# Patient Record
Sex: FEMALE | Race: WHITE | Employment: OTHER | URBAN - NONMETROPOLITAN AREA
[De-identification: names, ages, dates, MRNs, and addresses within clinical notes are randomized per-mention and may not be internally consistent; named-entity substitution may affect disease eponyms.]

---

## 2017-01-12 DIAGNOSIS — F41.9 ANXIETY: Primary | ICD-10-CM

## 2017-01-16 RX ORDER — CLONAZEPAM 2 MG/1
TABLET ORAL
Qty: 30 TABLET | Refills: 2 | Status: SHIPPED | OUTPATIENT
Start: 2017-01-16 | End: 2017-07-11

## 2017-01-16 NOTE — TELEPHONE ENCOUNTER
Thanks Mary. I will refill but I'll include note she needs a follow-up for any further refills. I filled enough for time for her to make an apptmt.

## 2017-02-15 ENCOUNTER — HOSPITAL ENCOUNTER (EMERGENCY)
Facility: HOSPITAL | Age: 22
Discharge: HOME | End: 2017-02-15
Attending: PHYSICIAN ASSISTANT | Admitting: PHYSICIAN ASSISTANT
Payer: COMMERCIAL

## 2017-02-15 VITALS
OXYGEN SATURATION: 99 % | DIASTOLIC BLOOD PRESSURE: 69 MMHG | SYSTOLIC BLOOD PRESSURE: 115 MMHG | RESPIRATION RATE: 16 BRPM | HEART RATE: 77 BPM | TEMPERATURE: 98.1 F

## 2017-02-15 DIAGNOSIS — G43.909 MIGRAINE WITHOUT STATUS MIGRAINOSUS, NOT INTRACTABLE, UNSPECIFIED MIGRAINE TYPE: ICD-10-CM

## 2017-02-15 PROCEDURE — 99213 OFFICE O/P EST LOW 20 MIN: CPT | Performed by: PHYSICIAN ASSISTANT

## 2017-02-15 PROCEDURE — 96372 THER/PROPH/DIAG INJ SC/IM: CPT

## 2017-02-15 PROCEDURE — 25000125 ZZHC RX 250: Performed by: PHYSICIAN ASSISTANT

## 2017-02-15 PROCEDURE — 25000128 H RX IP 250 OP 636: Performed by: PHYSICIAN ASSISTANT

## 2017-02-15 PROCEDURE — 99214 OFFICE O/P EST MOD 30 MIN: CPT | Mod: 25

## 2017-02-15 RX ORDER — DIAZEPAM 10 MG/2ML
5 INJECTION, SOLUTION INTRAMUSCULAR; INTRAVENOUS ONCE
Status: COMPLETED | OUTPATIENT
Start: 2017-02-15 | End: 2017-02-15

## 2017-02-15 RX ORDER — DEXAMETHASONE SODIUM PHOSPHATE 10 MG/ML
10 INJECTION, SOLUTION INTRAMUSCULAR; INTRAVENOUS ONCE
Status: COMPLETED | OUTPATIENT
Start: 2017-02-15 | End: 2017-02-15

## 2017-02-15 RX ORDER — DIPHENHYDRAMINE HYDROCHLORIDE 50 MG/ML
25 INJECTION INTRAMUSCULAR; INTRAVENOUS ONCE
Status: COMPLETED | OUTPATIENT
Start: 2017-02-15 | End: 2017-02-15

## 2017-02-15 RX ORDER — KETOROLAC TROMETHAMINE 30 MG/ML
30 INJECTION, SOLUTION INTRAMUSCULAR; INTRAVENOUS ONCE
Status: COMPLETED | OUTPATIENT
Start: 2017-02-15 | End: 2017-02-15

## 2017-02-15 RX ORDER — ONDANSETRON 4 MG/1
4 TABLET, ORALLY DISINTEGRATING ORAL ONCE
Status: COMPLETED | OUTPATIENT
Start: 2017-02-15 | End: 2017-02-15

## 2017-02-15 RX ADMIN — DEXAMETHASONE SODIUM PHOSPHATE 10 MG: 10 INJECTION INTRAMUSCULAR; INTRAVENOUS at 16:16

## 2017-02-15 RX ADMIN — ONDANSETRON 4 MG: 4 TABLET, ORALLY DISINTEGRATING ORAL at 15:44

## 2017-02-15 RX ADMIN — DIPHENHYDRAMINE HYDROCHLORIDE 25 MG: 50 INJECTION, SOLUTION INTRAMUSCULAR; INTRAVENOUS at 16:14

## 2017-02-15 RX ADMIN — SODIUM CHLORIDE 1000 ML: 9 INJECTION, SOLUTION INTRAVENOUS at 15:48

## 2017-02-15 RX ADMIN — DIAZEPAM 5 MG: 5 INJECTION, SOLUTION INTRAMUSCULAR; INTRAVENOUS at 16:46

## 2017-02-15 RX ADMIN — KETOROLAC TROMETHAMINE 30 MG: 30 INJECTION, SOLUTION INTRAMUSCULAR; INTRAVENOUS at 15:49

## 2017-02-15 ASSESSMENT — ENCOUNTER SYMPTOMS
VOMITING: 1
RESPIRATORY NEGATIVE: 1
NAUSEA: 1
CHILLS: 0
HEADACHES: 1
CARDIOVASCULAR NEGATIVE: 1
PSYCHIATRIC NEGATIVE: 1
FEVER: 0

## 2017-02-15 NOTE — ED AVS SNAPSHOT
[facility]    [address] [address]    Phone:  [phone]                                       Valorie Awad   MRN: [id number]    Department:  [facility]   Date of Visit:  2/15/2017           After Visit Summary Signature Page     I have received my discharge instructions, and my questions have been answered. I have discussed any challenges I see with this plan with the nurse or doctor.    ..........................................................................................................................................  Patient/Patient Representative Signature      ..........................................................................................................................................  Patient Representative Print Name and Relationship to Patient    ..................................................               ................................................  Date                                            Time    ..........................................................................................................................................  Reviewed by Signature/Title    ...................................................              ..............................................  Date                                                            Time

## 2017-02-15 NOTE — DISCHARGE INSTRUCTIONS
- Rest, fluids  - Follow up tomorrow if still having headaches, epsecially with any fevers/sinus pressure.     * Back here with worst headache of life.

## 2017-02-15 NOTE — LETTER
[facility]  [address]  [place] [address]  [phone]    February 15, 2017    Valorie Awad  [address]   [place] [address]  [phone] (home)     : 1995      To Whom it may concern:    Valorie Awad was seen and treated in our Emergency Department today, February 15, 2017.  I expect her condition to improve over the next 1-2 days.  She may return to work/school when improved.          Sincerely,        Gold Dumont PA-C   2/15/2017   4:59 PM

## 2017-02-15 NOTE — ED AVS SNAPSHOT
[facility]    [address] [place] [address]    Phone:  [phone]                                       Valorie Awad   MRN: [id number]    Department:  [facility]   Date of Visit:  2/15/2017           Patient Information     Date Of Birth          1995        Your diagnoses for this visit were:     Migraine without status migrainosus, not intractable, unspecified migraine type        You were seen by Gold Dumont PA.      Follow-up Information     Go to Bisi Muro MD.    Specialty:  Family Practice    Why:  Monday, February 20th @ 1:45pm    Contact information:    [facility]  [address] [facility] [address] [phone]          Please follow up.    Why:  OR tomorrow with someone in family practice if still having headaches        Discharge Instructions       - Rest, fluids  - Follow up tomorrow if still having headaches, epsecially with any fevers/sinus pressure.     * Back here with worst headache of life.     Discharge References/Attachments     HEADACHE, MIGRAINE: STAGES AND TREATMENT (ENGLISH)      Future Appointments        Provider Department Dept Phone Center    2/20/2017 2:00 PM Bisi Muro MD [facility] [phone] [facility]         Review of your medicines      Our records show that you are taking the medicines listed below. If these are incorrect, please call your family doctor or clinic.        Dose / Directions Last dose taken    butalbital-acetaminophen-caffeine -40 MG per tablet   Commonly known as:  FIORICET/ESGIC   Quantity:  30 tablet        TAKE 1 TABLET BY MOUTH EVERY 4 HOURS AS NEEDED, MAX OF 6 TABLETS IN 24 HOURS   Refills:  0        clonazePAM 2 MG tablet   Commonly known as:  klonoPIN   Quantity:  30 tablet        Take 1 tab daily as needed for anxiety. * need clinic follow-up apptmt for further refills [phone]   Refills:  2        cyclobenzaprine 10 MG tablet   Commonly known as:  FLEXERIL    Quantity:  40 tablet        TAKE 1 TABLET BY MOUTH 3 TIMES A DAY AS NEEDED FOR MUSCLE SPASMS   Refills:  0        gabapentin 300 MG capsule   Commonly known as:  NEURONTIN   Dose:  300 mg   Quantity:  90 capsule        Take 1 capsule (300 mg) by mouth 3 times daily   Refills:  1        IBUPROFEN PO   Dose:  800 mg        Take 800 mg by mouth   Refills:  0        medroxyPROGESTERone 150 MG/ML injection   Commonly known as:  DEPO-PROVERA   Dose:  150 mg   Quantity:  1 mL        Inject 1 mL (150 mg) into the muscle every 3 months   Refills:  3        nabumetone 500 MG tablet   Commonly known as:  RELAFEN   Dose:  500-1000 mg   Quantity:  60 tablet        Take 1-2 tablets (500-1,000 mg) by mouth 2 times daily as needed for moderate pain   Refills:  1        NEXPLANON 68 MG Impl   Dose:  1 each   Generic drug:  etonogestrel        1 each by Subdermal route once.   Refills:  0        propranolol 60 MG 24 hr capsule   Commonly known as:  INDERAL LA   Dose:  180 mg   Quantity:  90 capsule        Take 3 capsules (180 mg) by mouth daily   Refills:  2        sertraline 100 MG tablet   Commonly known as:  ZOLOFT   Dose:  150 mg   Quantity:  45 tablet        Take 1.5 tablets (150 mg) by mouth daily   Refills:  11        SUMAtriptan 100 MG tablet   Commonly known as:  IMITREX   Dose:  100 mg   Quantity:  9 tablet        Take 1 tablet (100 mg) by mouth at onset of headache for migraine May repeat in 2 hours if needed: max 2/day; average number of headaches monthly   Refills:  3        traMADol 50 MG tablet   Commonly known as:  ULTRAM   Quantity:  10 tablet        TAKE 1 TABLET BY MOUTH EVERY 6 HOURS AS NEEDED FOR MODERATE PAIN   Refills:  0                Orders Needing Specimen Collection     None      Pending Results     No orders found from 2/13/2017 to 2/16/2017.            Pending Culture Results     No orders found from 2/13/2017 to 2/16/2017.            Thank you for choosing [facility]       Thank you for choosing  "[facility] for your care. Our goal is always to provide you with excellent care. Hearing back from our patients is one way we can continue to improve our services. Please take a few minutes to complete the written survey that you may receive in the mail after you visit with us. Thank you!        [facility] Information     [email or web address] lets you send messages to your doctor, view your test results, renew your prescriptions, schedule appointments and more. To sign up, go to [email or web address] . Click on \"Log in\" on the left side of the screen, which will take you to the Welcome page. Then click on \"Sign up Now\" on the right side of the page.     You will be asked to enter the access code listed below, as well as some personal information. Please follow the directions to create your username and password.     Your access code is: [email or web address]  Expires: 2017  4:59 PM     Your access code will  in 90 days. If you need help or a new code, please call your [facility] clinic or [phone].        Care EveryWhere ID     This is your Care EveryWhere ID. This could be used by other organizations to access your [facility] medical records  [id number]        After Visit Summary       This is your record. Keep this with you and show to your community pharmacist(s) and doctor(s) at your next visit.                  "

## 2017-02-15 NOTE — ED PROVIDER NOTES
History     Chief Complaint   Patient presents with     Headache     c/o migraine h/a, notes chronic migraines, usually gets a shot of toradol and a pill     The history is provided by the patient. No  was used.     Valorie Awad is a 21 year old female who presents with her typical migraine that is NOT responding to imitrex. Pt reports photophobia, phonophobia, nausea and vomiting since midnight. She is unable to keep anything down today. She denies sinus symptoms, fevers. She also takes inderal LA and fioricet for migraine and not helping.      I have reviewed the Medications, Allergies, Past Medical and Surgical History, and Social History in the Epic system.    Review of Systems   Constitutional: Negative for chills and fever.   Respiratory: Negative.    Cardiovascular: Negative.    Gastrointestinal: Positive for nausea and vomiting.   Skin: Negative for rash.   Neurological: Positive for headaches.   Psychiatric/Behavioral: Negative.        Physical Exam   BP: 137/79  Heart Rate: 100  Temp: 96.7  F (35.9  C)  Resp: 18  SpO2: 98 %  Physical Exam   Constitutional: She is oriented to person, place, and time. She appears well-developed and well-nourished. She appears distressed (pt appears uncomfortable, seated in dim room).   HENT:   Head: Normocephalic and atraumatic.   Right Ear: External ear normal.   Left Ear: External ear normal.   Mouth/Throat: Oropharynx is clear and moist.   Eyes: Conjunctivae and EOM are normal. Pupils are equal, round, and reactive to light.   Cardiovascular: Normal rate.    Pulmonary/Chest: Effort normal.   Lymphadenopathy:     She has no cervical adenopathy.   Neurological: She is alert and oriented to person, place, and time. No cranial nerve deficit.   Skin: Skin is warm and dry.   Psychiatric: She has a normal mood and affect.   Nursing note and vitals reviewed.      ED Course     ED Course     Procedures  Medications   0.9% sodium chloride BOLUS (0 mLs  Intravenous Stopped 2/15/17 1648)   ondansetron (ZOFRAN-ODT) ODT tab 4 mg (4 mg Oral Given 2/15/17 1544)   ketorolac (TORADOL) injection 30 mg (30 mg Intravenous Given 2/15/17 1549)   dexamethasone (DECADRON) injection 10 mg (10 mg Intravenous Given 2/15/17 1616)   diphenhydrAMINE (BENADRYL) injection 25 mg (25 mg Intravenous Given 2/15/17 1614)   diazepam (VALIUM) injection 5 mg (5 mg Intravenous Given 2/15/17 1646)   Patient tolerated. Patient observed for 20 minutes.       Assessments & Plan (with Medical Decision Making)     I have reviewed the nursing notes.    I have reviewed the findings, diagnosis, plan and need for follow up with the patient.    Discharge Medication List as of 2/15/2017  4:59 PM          Final diagnoses:   Migraine without status migrainosus, not intractable, unspecified migraine type   Migraine improved to manageable 4/10 prior to discharge. Pt will sleep and remain hydrated. She will f/u in primary care clinic tomorrow if still having HA, otherwise on Monday. She will seek attention sooner with recurrence of intense HA/worst HA of life. Patient verbally educated and given appropriate education sheets for each of the diagnoses and has no questions.    Gold Dumont PA-C   2/15/2017   6:13 PM    2/15/2017   [facility]     Gold Dumont PA  02/15/17 [phone]

## 2017-03-09 ENCOUNTER — APPOINTMENT (OUTPATIENT)
Dept: LAB | Facility: OTHER | Age: 22
End: 2017-03-09
Attending: NURSE PRACTITIONER
Payer: COMMERCIAL

## 2017-03-09 ENCOUNTER — OFFICE VISIT (OUTPATIENT)
Dept: OBGYN | Facility: OTHER | Age: 22
End: 2017-03-09
Attending: NURSE PRACTITIONER
Payer: COMMERCIAL

## 2017-03-09 DIAGNOSIS — Z30.42 ENCOUNTER FOR SURVEILLANCE OF INJECTABLE CONTRACEPTIVE: Primary | ICD-10-CM

## 2017-03-09 LAB — HCG UR QL: NEGATIVE

## 2017-03-09 PROCEDURE — 96372 THER/PROPH/DIAG INJ SC/IM: CPT | Performed by: NURSE PRACTITIONER

## 2017-03-09 PROCEDURE — 81025 URINE PREGNANCY TEST: CPT | Performed by: NURSE PRACTITIONER

## 2017-03-09 PROCEDURE — 99207 ZZC NO CHARGE NURSE ONLY: CPT | Performed by: NURSE PRACTITIONER

## 2017-03-09 NOTE — PROGRESS NOTES
The following medication was given:     MEDICATION: Depo Provera 150mg  ROUTE: IM  SITE: Deltoid - Left  DOSE: 1 mL  LOT #: [id number]  :  [employer]   EXPIRATION DATE:  8/31/2019  NDC#: [address]-8  Patient will return to the clinic for another injection between 5/25/2017-6/8/2017.    Mary Jarvis

## 2017-03-09 NOTE — MR AVS SNAPSHOT
"              After Visit Summary   3/9/2017    Valorie Awad    MRN: [id number]           Patient Information     Date Of Birth          1995        Visit Information        Provider Department      3/9/2017 11:00 AM [facility]        Today's Diagnoses     Encounter for surveillance of injectable contraceptive    -  1       Follow-ups after your visit        Who to contact     If you have questions or need follow up information about today's clinic visit or your schedule please contact [facility] directly at [phone].  Normal or non-critical lab and imaging results will be communicated to you by S[employer], letter or phone within 4 business days after the clinic has received the results. If you do not hear from us within 7 days, please contact the clinic through Sunglass[employer] or phone. If you have a critical or abnormal lab result, we will notify you by phone as soon as possible.  Submit refill requests through [employer] or call your pharmacy and they will forward the refill request to us. Please allow 3 business days for your refill to be completed.          Additional Information About Your Visit        MyChart Information     GERS lets you send messages to your doctor, view your test results, renew your prescriptions, schedule appointments and more. To sign up, go to [email or web address] . Click on \"Log in\" on the left side of the screen, which will take you to the Welcome page. Then click on \"Sign up Now\" on the right side of the page.     You will be asked to enter the access code listed below, as well as some personal information. Please follow the directions to create your username and password.     Your access code is: [email or web address]  Expires: 2017  4:59 PM     Your access code will  in 90 days. If you need help or a new code, please call your [facility] or [phone].        Care EveryWhere ID     This is your Care EveryWhere ID. This could " be used by other organizations to access your [facility] medical records  [id number]         Blood Pressure from Last 3 Encounters:   02/15/17 115/69   07/26/16 120/78   04/11/16 112/70    Weight from Last 3 Encounters:   07/26/16 210 lb (95.3 kg)   04/11/16 210 lb (95.3 kg)   03/10/16 209 lb (94.8 kg)              We Performed the Following     HCG qualitative urine        Primary Care Provider Office Phone # Fax #    Bisi Muro [phone]       [facility] [place] [address]  [place] [address]        Thank you!     Thank you for choosing [facility] [place]  for your care. Our goal is always to provide you with excellent care. Hearing back from our patients is one way we can continue to improve our services. Please take a few minutes to complete the written survey that you may receive in the mail after your visit with us. Thank you!             Your Updated Medication List - Protect others around you: Learn how to safely use, store and throw away your medicines at www.disposemymeds.org.          This list is accurate as of: 3/9/17 11:17 AM.  Always use your most recent med list.                   Brand Name Dispense Instructions for use    butalbital-acetaminophen-caffeine -40 MG per tablet    FIORICET/ESGIC    30 tablet    TAKE 1 TABLET BY MOUTH EVERY 4 HOURS AS NEEDED, MAX OF 6 TABLETS IN 24 HOURS       clonazePAM 2 MG tablet    klonoPIN    30 tablet    Take 1 tab daily as needed for anxiety. * need clinic follow-up apptmt for further refills [phone]       cyclobenzaprine 10 MG tablet    FLEXERIL    40 tablet    TAKE 1 TABLET BY MOUTH 3 TIMES A DAY AS NEEDED FOR MUSCLE SPASMS       gabapentin 300 MG capsule    NEURONTIN    90 capsule    Take 1 capsule (300 mg) by mouth 3 times daily       IBUPROFEN PO      Take 800 mg by mouth       medroxyPROGESTERone 150 MG/ML injection    DEPO-PROVERA    1 mL    Inject 1 mL (150 mg) into the muscle every 3 months       nabumetone 500  MG tablet    RELAFEN    60 tablet    Take 1-2 tablets (500-1,000 mg) by mouth 2 times daily as needed for moderate pain       NEXPLANON 68 MG Impl   Generic drug:  etonogestrel      1 each by Subdermal route once.       propranolol 60 MG 24 hr capsule    INDERAL LA    90 capsule    Take 3 capsules (180 mg) by mouth daily       sertraline 100 MG tablet    ZOLOFT    45 tablet    Take 1.5 tablets (150 mg) by mouth daily       SUMAtriptan 100 MG tablet    IMITREX    9 tablet    Take 1 tablet (100 mg) by mouth at onset of headache for migraine May repeat in 2 hours if needed: max 2/day; average number of headaches monthly       traMADol 50 MG tablet    ULTRAM    10 tablet    TAKE 1 TABLET BY MOUTH EVERY 6 HOURS AS NEEDED FOR MODERATE PAIN

## 2017-03-22 ENCOUNTER — OFFICE VISIT (OUTPATIENT)
Dept: FAMILY MEDICINE | Facility: OTHER | Age: 22
End: 2017-03-22
Attending: FAMILY MEDICINE
Payer: COMMERCIAL

## 2017-03-22 VITALS
OXYGEN SATURATION: 98 % | WEIGHT: 207 LBS | TEMPERATURE: 98.5 F | DIASTOLIC BLOOD PRESSURE: 72 MMHG | HEART RATE: 88 BPM | HEIGHT: 66 IN | BODY MASS INDEX: 33.27 KG/M2 | SYSTOLIC BLOOD PRESSURE: 108 MMHG

## 2017-03-22 DIAGNOSIS — G43.009 MIGRAINE WITHOUT AURA AND WITHOUT STATUS MIGRAINOSUS, NOT INTRACTABLE: ICD-10-CM

## 2017-03-22 DIAGNOSIS — G43.109 MIGRAINE WITH AURA AND WITHOUT STATUS MIGRAINOSUS, NOT INTRACTABLE: ICD-10-CM

## 2017-03-22 DIAGNOSIS — H66.003 ACUTE SUPPURATIVE OTITIS MEDIA OF BOTH EARS WITHOUT SPONTANEOUS RUPTURE OF TYMPANIC MEMBRANES, RECURRENCE NOT SPECIFIED: Primary | ICD-10-CM

## 2017-03-22 PROBLEM — Z71.89 ACP (ADVANCE CARE PLANNING): Chronic | Status: ACTIVE | Noted: 2017-03-22

## 2017-03-22 PROCEDURE — 99213 OFFICE O/P EST LOW 20 MIN: CPT | Performed by: FAMILY MEDICINE

## 2017-03-22 PROCEDURE — 99212 OFFICE O/P EST SF 10 MIN: CPT

## 2017-03-22 RX ORDER — TRAMADOL HYDROCHLORIDE 50 MG/1
TABLET ORAL
Qty: 10 TABLET | Refills: 0 | Status: SHIPPED | OUTPATIENT
Start: 2017-03-22 | End: 2018-01-19

## 2017-03-22 RX ORDER — SUMATRIPTAN 100 MG/1
100 TABLET, FILM COATED ORAL
Qty: 9 TABLET | Refills: 3 | Status: SHIPPED | OUTPATIENT
Start: 2017-03-22 | End: 2018-03-15

## 2017-03-22 RX ORDER — AZITHROMYCIN 250 MG/1
TABLET, FILM COATED ORAL
Qty: 6 TABLET | Refills: 0 | Status: SHIPPED | OUTPATIENT
Start: 2017-03-22 | End: 2017-05-16

## 2017-03-22 RX ORDER — PROPRANOLOL HCL 60 MG
180 CAPSULE, EXTENDED RELEASE 24HR ORAL DAILY
Qty: 90 CAPSULE | Refills: 2 | Status: SHIPPED | OUTPATIENT
Start: 2017-03-22 | End: 2017-05-16

## 2017-03-22 RX ORDER — BUTALBITAL, ACETAMINOPHEN AND CAFFEINE 50; 325; 40 MG/1; MG/1; MG/1
TABLET ORAL
Qty: 30 TABLET | Refills: 0 | Status: SHIPPED | OUTPATIENT
Start: 2017-03-22 | End: 2018-01-19

## 2017-03-22 ASSESSMENT — ANXIETY QUESTIONNAIRES
7. FEELING AFRAID AS IF SOMETHING AWFUL MIGHT HAPPEN: SEVERAL DAYS
6. BECOMING EASILY ANNOYED OR IRRITABLE: MORE THAN HALF THE DAYS
1. FEELING NERVOUS, ANXIOUS, OR ON EDGE: SEVERAL DAYS
GAD7 TOTAL SCORE: 5
3. WORRYING TOO MUCH ABOUT DIFFERENT THINGS: SEVERAL DAYS
2. NOT BEING ABLE TO STOP OR CONTROL WORRYING: NOT AT ALL
IF YOU CHECKED OFF ANY PROBLEMS ON THIS QUESTIONNAIRE, HOW DIFFICULT HAVE THESE PROBLEMS MADE IT FOR YOU TO DO YOUR WORK, TAKE CARE OF THINGS AT HOME, OR GET ALONG WITH OTHER PEOPLE: NOT DIFFICULT AT ALL
5. BEING SO RESTLESS THAT IT IS HARD TO SIT STILL: NOT AT ALL

## 2017-03-22 ASSESSMENT — PAIN SCALES - GENERAL: PAINLEVEL: MILD PAIN (3)

## 2017-03-22 ASSESSMENT — PATIENT HEALTH QUESTIONNAIRE - PHQ9: 5. POOR APPETITE OR OVEREATING: NOT AT ALL

## 2017-03-22 NOTE — MR AVS SNAPSHOT
"              After Visit Summary   3/22/2017    Valorie Awad    MRN: [id number]           Patient Information     Date Of Birth          1995        Visit Information        Provider Department      3/22/2017 4:00 PM [facility]        Today's Diagnoses     Acute suppurative otitis media of both ears without spontaneous rupture of tympanic membranes, recurrence not specified    -  1    Migraine with aura and without status migrainosus, not intractable        Migraine without aura and without status migrainosus, not intractable          Care Instructions    Take azithromycin (Zithromax) as written        Follow-ups after your visit        Follow-up notes from your care team     Return if symptoms worsen or fail to improve.      Who to contact     If you have questions or need follow up information about today's clinic visit or your schedule please contact [facility] directly at [phone].  Normal or non-critical lab and imaging results will be communicated to you by MyChart, letter or phone within 4 business days after the clinic has received the results. If you do not hear from us within 7 days, please contact the clinic through MyChart or phone. If you have a critical or abnormal lab result, we will notify you by phone as soon as possible.  Submit refill requests through [employer] or call your pharmacy and they will forward the refill request to us. Please allow 3 business days for your refill to be completed.          Additional Information About Your Visit        MyChart Information     [employer] lets you send messages to your doctor, view your test results, renew your prescriptions, schedule appointments and more. To sign up, go to [email or web address] . Click on \"Log in\" on the left side of the screen, which will take you to the Welcome page. Then click on \"Sign up Now\" on the right side of the page.     You will be asked to enter the access code listed " "below, as well as some personal information. Please follow the directions to create your username and password.     Your access code is: [email or web address]  Expires: 2017  5:59 PM     Your access code will  in 90 days. If you need help or a new code, please call your [facility] or [phone].        Care EveryWhere ID     This is your Care EveryWhere ID. This could be used by other organizations to access your [facility] medical records  [id number]        Your Vitals Were     Pulse Temperature Height Pulse Oximetry BMI (Body Mass Index)       88 98.5  F (36.9  C) (Tympanic) 5' 6\" (1.676 m) 98% 33.41 kg/m2        Blood Pressure from Last 3 Encounters:   17 108/72   02/15/17 115/69   16 120/78    Weight from Last 3 Encounters:   17 207 lb (93.9 kg)   16 210 lb (95.3 kg)   16 210 lb (95.3 kg)              Today, you had the following     No orders found for display         Today's Medication Changes          These changes are accurate as of: 3/22/17 11:59 PM.  If you have any questions, ask your nurse or doctor.               Start taking these medicines.        Dose/Directions    azithromycin 250 MG tablet   Commonly known as:  ZITHROMAX   Used for:  Acute suppurative otitis media of both ears without spontaneous rupture of tympanic membranes, recurrence not specified   Started by:  Torres Fu MD        Two tablets first day, then one tablet daily for four days.   Quantity:  6 tablet   Refills:  0         These medicines have changed or have updated prescriptions.        Dose/Directions    traMADol 50 MG tablet   Commonly known as:  ULTRAM   This may have changed:  See the new instructions.   Used for:  Migraine with aura and without status migrainosus, not intractable   Changed by:  Torres Fu MD        TAKE 1 TABLET BY MOUTH EVERY 6 HOURS AS NEEDED FOR MODERATE PAIN   Quantity:  10 tablet   Refills:  0         Stop taking these medicines if you haven't already. " Please contact your care team if you have questions.     cyclobenzaprine 10 MG tablet   Commonly known as:  FLEXERIL   Stopped by:  Torres Fu MD           gabapentin 300 MG capsule   Commonly known as:  NEURONTIN   Stopped by:  Torres Fu MD           IBUPROFEN PO   Stopped by:  Torres Fu MD           nabumetone 500 MG tablet   Commonly known as:  RELAFEN   Stopped by:  Torres Fu MD           NEXPLANON 68 MG Impl   Generic drug:  etonogestrel   Stopped by:  Torres Fu MD           sertraline 100 MG tablet   Commonly known as:  ZOLOFT   Stopped by:  Torres Fu MD                Where to get your medicines      These medications were sent to [facility] - [address]  [address] [place] [address]     Phone:  [phone]     azithromycin 250 MG tablet    propranolol 60 MG 24 hr capsule         Some of these will need a paper prescription and others can be bought over the counter.  Ask your nurse if you have questions.     Bring a paper prescription for each of these medications     butalbital-acetaminophen-caffeine -40 MG per tablet    SUMAtriptan 100 MG tablet    traMADol 50 MG tablet                Primary Care Provider Office Phone # Fax #    Bisi Muro [phone]       [facility] [place] [address] [facility] [facility] [place] [address]        Thank you!     Thank you for choosing [facility]  for your care. Our goal is always to provide you with excellent care. Hearing back from our patients is one way we can continue to improve our services. Please take a few minutes to complete the written survey that you may receive in the mail after your visit with us. Thank you!             Your Updated Medication List - Protect others around you: Learn how to safely use, store and throw away your medicines at www.disposemymeds.org.          This list is accurate as of: 3/22/17 11:59 PM.  Always use your most  recent med list.                   Brand Name Dispense Instructions for use    azithromycin 250 MG tablet    ZITHROMAX    6 tablet    Two tablets first day, then one tablet daily for four days.       butalbital-acetaminophen-caffeine -40 MG per tablet    FIORICET/ESGIC    30 tablet    TAKE 1 TABLET BY MOUTH EVERY 4 HOURS AS NEEDED, MAX OF 6 TABLETS IN 24 HOURS       clonazePAM 2 MG tablet    klonoPIN    30 tablet    Take 1 tab daily as needed for anxiety. * need clinic follow-up apptmt for further refills [phone]       medroxyPROGESTERone 150 MG/ML injection    DEPO-PROVERA    1 mL    Inject 1 mL (150 mg) into the muscle every 3 months       propranolol 60 MG 24 hr capsule    INDERAL LA    90 capsule    Take 3 capsules (180 mg) by mouth daily       SUMAtriptan 100 MG tablet    IMITREX    9 tablet    Take 1 tablet (100 mg) by mouth at onset of headache for migraine May repeat in 2 hours if needed: max 2/day; average number of headaches monthly       traMADol 50 MG tablet    ULTRAM    10 tablet    TAKE 1 TABLET BY MOUTH EVERY 6 HOURS AS NEEDED FOR MODERATE PAIN

## 2017-03-22 NOTE — LETTER
[facility] [place]  [address]  [place] [address]  [phone]  Dept: [phone]      3/22/2017    Re: Valorie Awad      TO WHOM IT MAY CONCERN:    Valorie Awad  was seen on 3.22.17.  Please excuse her  until 3.23.17 due to illness.    Cordially    Torres Fu MD  [facility]

## 2017-03-22 NOTE — NURSING NOTE
"Chief Complaint   Patient presents with     URI     bilateral ear pain, sore throat in the AM only. runny nose, chills Duration- 3 days Therapies Tried- Theraflu     Headache     follow up medication recheck/refill request- fioricet, inderal, imitrex, tramadol        Initial /72 (BP Location: Left arm, Patient Position: Chair)  Pulse 88  Temp 98.5  F (36.9  C) (Tympanic)  Ht 5' 6\" (1.676 m)  Wt 207 lb (93.9 kg)  SpO2 98%  BMI 33.41 kg/m2 Estimated body mass index is 33.41 kg/(m^2) as calculated from the following:    Height as of this encounter: 5' 6\" (1.676 m).    Weight as of this encounter: 207 lb (93.9 kg).  Medication Reconciliation: complete   MARVIN VU      "

## 2017-03-23 ASSESSMENT — ANXIETY QUESTIONNAIRES: GAD7 TOTAL SCORE: 5

## 2017-03-23 ASSESSMENT — PATIENT HEALTH QUESTIONNAIRE - PHQ9: SUM OF ALL RESPONSES TO PHQ QUESTIONS 1-9: 3

## 2017-03-24 NOTE — PROGRESS NOTES
SUBJECTIVE:  Valorie Awad, 22 year old, female is seen with bilateral ear pain, sore throat, and nasal congestion.  Present over the last several days.    Requests medication refills.    Denies fever, shaking, chills, nausea, vomiting, or diarrhea.  No household contacts are ill.      Current Outpatient Prescriptions   Medication Sig Dispense Refill     butalbital-acetaminophen-caffeine (FIORICET/ESGIC) -40 MG per tablet TAKE 1 TABLET BY MOUTH EVERY 4 HOURS AS NEEDED, MAX OF 6 TABLETS IN 24 HOURS 30 tablet 0     traMADol (ULTRAM) 50 MG tablet TAKE 1 TABLET BY MOUTH EVERY 6 HOURS AS NEEDED FOR MODERATE PAIN 10 tablet 0     SUMAtriptan (IMITREX) 100 MG tablet Take 1 tablet (100 mg) by mouth at onset of headache for migraine May repeat in 2 hours if needed: max 2/day; average number of headaches monthly 9 tablet 3     propranolol (INDERAL LA) 60 MG 24 hr capsule Take 3 capsules (180 mg) by mouth daily 90 capsule 2     azithromycin (ZITHROMAX) 250 MG tablet Two tablets first day, then one tablet daily for four days. 6 tablet 0     clonazePAM (KLONOPIN) 2 MG tablet Take 1 tab daily as needed for anxiety. * need clinic follow-up apptmt for further refills [phone] 30 tablet 2     medroxyPROGESTERone (DEPO-PROVERA) 150 MG/ML injection Inject 1 mL (150 mg) into the muscle every 3 months 1 mL 3        Allergies   Allergen Reactions     Bactrim [Sulfamethoxazole W-Trimethoprim] Rash     Morphine Hcl Nausea and Vomiting     Clindamycin Other (See Comments)     Thrush       Nitrofurantoin      Macrobid      Nitrofurantoin Monohydrate Macrocrystals      Macrobid      Loracarbef Rash     Lorabid       Past Medical History:   Diagnosis Date     Abnormal Pap smear      Anxiety 9/20/2001     Marijuana use      Migraine headaches 10/10/2007     Supervision of other normal pregnancy      Past Surgical History:   Procedure Laterality Date     Comminuted fracture elbow Left 10/25/2013    lauren placed     CYSTOSCOPY      bladder  distention     TONSILLECTOMY       Family History   Problem Relation Age of Onset     Other - See Comments Father      alcoholism     Depression Father      MENTAL ILLNESS Father      Depression Mother      Irritable Bowel Syndrome Mother      MENTAL ILLNESS Mother      Other - See Comments Mother      migraine headache     Other - See Comments Maternal Grandmother      blood disease     Lipids Maternal Grandmother      hyperlipidemia     Thyroid Disease Maternal Grandmother      DIABETES No family hx of      Asthma No family hx of      Social History     Social History     Marital status: Single     Spouse name: N/A     Number of children: N/A     Years of education: N/A     Occupational History     student      Social History Main Topics     Smoking status: Never Smoker     Smokeless tobacco: Never Used     Alcohol use No     Drug use: No     Sexual activity: Yes     Partners: Male     Other Topics Concern      Service No     Blood Transfusions Yes     Caffeine Concern No     Seat Belt Yes     Parent/Sibling W/ Cabg, Mi Or Angioplasty Before 65f 55m? No     Social History Narrative         Review Of Systems  Constitutional, HEENT, cardiovascular, pulmonary, gi and gu systems are negative, except as otherwise noted.    OBJECTIVE:  Vitals: B/P: 108/72, T: 98.5, P: 88, R: Data Unavailable    Exam:  Physical Exam   Constitutional: She is oriented to person, place, and time. She appears well-developed and well-nourished. No distress.   HENT:   Head: Normocephalic.   Right Ear: Hearing, external ear and ear canal normal. Tympanic membrane is erythematous. A middle ear effusion is present.   Left Ear: Hearing, external ear and ear canal normal. Tympanic membrane is erythematous. A middle ear effusion is present.   Nose: Nose normal. Right sinus exhibits no maxillary sinus tenderness and no frontal sinus tenderness. Left sinus exhibits no maxillary sinus tenderness and no frontal sinus tenderness.   Mouth/Throat:  Uvula is midline and oropharynx is clear and moist. No oropharyngeal exudate or posterior oropharyngeal erythema.   Eyes: Conjunctivae are normal.   Neck: Neck supple.   Pulmonary/Chest: Effort normal and breath sounds normal.   Lymphadenopathy:     She has no cervical adenopathy.   Neurological: She is alert and oriented to person, place, and time.   Skin: Skin is warm and dry.   Psychiatric: She has a normal mood and affect.     Other exam not repeated    Labs:  Office Visit on 03/09/2017   Component Date Value Ref Range Status     HCG Qual Urine 03/09/2017 Negative  NEG Final       ASSESSMENT/PLAN:  Acute suppurative otitis media of both ears without spontaneous rupture of tympanic membranes, recurrence not specified  Azithromycin (Zithromax) as written.  Continue over the counter decongestants.  Follow up with persistent symptoms.  - azithromycin (ZITHROMAX) 250 MG tablet; Two tablets first day, then one tablet daily for four days.    Migraine with aura and without status migrainosus, not intractable  Refilled as written.  - butalbital-acetaminophen-caffeine (FIORICET/ESGIC) -40 MG per tablet; TAKE 1 TABLET BY MOUTH EVERY 4 HOURS AS NEEDED, MAX OF 6 TABLETS IN 24 HOURS  - traMADol (ULTRAM) 50 MG tablet; TAKE 1 TABLET BY MOUTH EVERY 6 HOURS AS NEEDED FOR MODERATE PAIN  - propranolol (INDERAL LA) 60 MG 24 hr capsule; Take 3 capsules (180 mg) by mouth daily  - SUMAtriptan (IMITREX) 100 MG tablet; Take 1 tablet (100 mg) by mouth at onset of headache for migraine May repeat in 2 hours if needed: max 2/day; average number of headaches monthly            Torres Fu MD

## 2017-05-16 ENCOUNTER — HOSPITAL ENCOUNTER (EMERGENCY)
Facility: HOSPITAL | Age: 22
Discharge: HOME | End: 2017-05-16
Attending: PHYSICIAN ASSISTANT | Admitting: PHYSICIAN ASSISTANT
Payer: COMMERCIAL

## 2017-05-16 VITALS
DIASTOLIC BLOOD PRESSURE: 93 MMHG | SYSTOLIC BLOOD PRESSURE: 131 MMHG | TEMPERATURE: 98.2 F | RESPIRATION RATE: 16 BRPM | HEART RATE: 98 BPM | OXYGEN SATURATION: 98 %

## 2017-05-16 DIAGNOSIS — N73.9 PID (PELVIC INFLAMMATORY DISEASE): ICD-10-CM

## 2017-05-16 LAB
ALBUMIN UR-MCNC: NEGATIVE MG/DL
APPEARANCE UR: CLEAR
BACTERIA #/AREA URNS HPF: ABNORMAL /HPF
BILIRUB UR QL STRIP: NEGATIVE
COLOR UR AUTO: ABNORMAL
GLUCOSE UR STRIP-MCNC: NEGATIVE MG/DL
HCG UR QL: NEGATIVE
HGB UR QL STRIP: ABNORMAL
KETONES UR STRIP-MCNC: NEGATIVE MG/DL
LEUKOCYTE ESTERASE UR QL STRIP: ABNORMAL
MICRO REPORT STATUS: NORMAL
MUCOUS THREADS #/AREA URNS LPF: PRESENT /LPF
NITRATE UR QL: NEGATIVE
PH UR STRIP: 6.5 PH (ref 4.7–8)
RBC #/AREA URNS AUTO: 23 /HPF (ref 0–2)
SP GR UR STRIP: 1 (ref 1–1.03)
SPECIMEN SOURCE: NORMAL
URN SPEC COLLECT METH UR: ABNORMAL
UROBILINOGEN UR STRIP-MCNC: NORMAL MG/DL (ref 0–2)
WBC #/AREA URNS AUTO: 5 /HPF (ref 0–2)
WET PREP SPEC: NORMAL

## 2017-05-16 PROCEDURE — 87491 CHLMYD TRACH DNA AMP PROBE: CPT | Performed by: PHYSICIAN ASSISTANT

## 2017-05-16 PROCEDURE — 99284 EMERGENCY DEPT VISIT MOD MDM: CPT | Mod: 25

## 2017-05-16 PROCEDURE — 99284 EMERGENCY DEPT VISIT MOD MDM: CPT | Performed by: PHYSICIAN ASSISTANT

## 2017-05-16 PROCEDURE — 81001 URINALYSIS AUTO W/SCOPE: CPT | Performed by: PHYSICIAN ASSISTANT

## 2017-05-16 PROCEDURE — 87210 SMEAR WET MOUNT SALINE/INK: CPT | Performed by: PHYSICIAN ASSISTANT

## 2017-05-16 PROCEDURE — 81025 URINE PREGNANCY TEST: CPT | Performed by: PHYSICIAN ASSISTANT

## 2017-05-16 PROCEDURE — 25000132 ZZH RX MED GY IP 250 OP 250 PS 637: Performed by: PHYSICIAN ASSISTANT

## 2017-05-16 PROCEDURE — 96372 THER/PROPH/DIAG INJ SC/IM: CPT

## 2017-05-16 PROCEDURE — 25000128 H RX IP 250 OP 636: Performed by: PHYSICIAN ASSISTANT

## 2017-05-16 PROCEDURE — 87591 N.GONORRHOEAE DNA AMP PROB: CPT | Performed by: PHYSICIAN ASSISTANT

## 2017-05-16 RX ORDER — CEFTRIAXONE SODIUM 1 G
250 VIAL (EA) INJECTION ONCE
Status: COMPLETED | OUTPATIENT
Start: 2017-05-16 | End: 2017-05-16

## 2017-05-16 RX ORDER — AZITHROMYCIN 250 MG/1
1000 TABLET, FILM COATED ORAL ONCE
Status: COMPLETED | OUTPATIENT
Start: 2017-05-16 | End: 2017-05-16

## 2017-05-16 RX ADMIN — AZITHROMYCIN 1000 MG: 250 TABLET, FILM COATED ORAL at 21:20

## 2017-05-16 RX ADMIN — CEFTRIAXONE 250 MG: 1 INJECTION, POWDER, FOR SOLUTION INTRAMUSCULAR; INTRAVENOUS at 21:20

## 2017-05-16 ASSESSMENT — ENCOUNTER SYMPTOMS
UNEXPECTED WEIGHT CHANGE: 0
CONSTIPATION: 0
SORE THROAT: 0
VOMITING: 0
FEVER: 0
ABDOMINAL PAIN: 0
NEUROLOGICAL NEGATIVE: 1
HEMATURIA: 0
DIARRHEA: 0
FLANK PAIN: 0
BLOOD IN STOOL: 0
APPETITE CHANGE: 0
CHILLS: 0
NAUSEA: 0
SHORTNESS OF BREATH: 0
ANAL BLEEDING: 0
DYSURIA: 0
DIFFICULTY URINATING: 0
FREQUENCY: 0
MUSCULOSKELETAL NEGATIVE: 1
ABDOMINAL DISTENTION: 0

## 2017-05-16 NOTE — ED AVS SNAPSHOT
[facility]    [address]    [place] [address]    Phone:  [phone]                                       Valorie Awad   MRN: [id number]    Department:  [facility]   Date of Visit:  5/16/2017           Patient Information     Date Of Birth          1995        Your diagnoses for this visit were:     PID (pelvic inflammatory disease)        You were seen by Cecy Chen PA-C.      Follow-up Information     Follow up with Bisi Muro MD In 3 days.    Specialty:  Family Practice    Contact information:    [facility] [place]  [address]  [place] [address] [phone]          Follow up with [facility].    Specialty:  EMERGENCY MEDICINE    Why:  If symptoms worsen    Contact information:    [address]  [place] [address] [phone]    Additional information:    From [facility]: Take [address]. Turn left at [address]. Turn left at the first stoplight on [address]. At the first stop sign, take a right onto [address]. Take a left into the parking lot and continue through until you reach the North enterance of the building.       From [place]: Take [address]. Take the [address] ramp towards [place]. Turn left onto [address]. Take a slight right onto [address]. Turn left at the first stoplight on [address]. At the first stop sign, take a right onto [address]. Take a left into the parking lot and continue through until you reach the North enterance of the building.       From [place]: Take [address]. Take a right at [address]. At the first stop sign, take a right onto [address]. Take a left into the parking lot and continue through until you reach the North enterance of the building.         Discharge Instructions       I am treating you as if you have a pelvic infection, though not all tests are back yet. If any of the tests  come back positive, we will call you. Otherwise, follow up with your primary care provider for re-check in 3 days and consider getting your depo shot soon.         Pelvic Inflammatory Disease    Pelvic inflammatory disease (PID) is an infection of the female reproductive organs. These include the vagina, cervix, uterus, fallopian tubes, and ovaries.  PID is a common problem among women. It is most often caused by gonorrhea or chlamydia. These are bacterial infections that are spread through sex. For this reason, they are known as sexually transmitted diseases (STDs). PID can also be caused by other infections, though this is much less common.  When PID is found and treated early, it can often be cured. If not treated promptly, PID can lead to serious health problems. These include chronic pelvic pain and damage to the reproductive organs. PID can also lead to infertility. And it can increase the risk of tubal pregnancy. Mild cases of PID can often be treated at home. Severe cases of PID may need to be treated in the hospital.  Home care    You will likely be prescribed a combination of antibiotics. Be sure to take the medicines exactly as directed.    To help relieve pain, you may take over-the-counter pain medicine. Pain medicine may also be prescribed, if needed.    Inform any partners you ve had sex with about your condition. They will need to be tested for infection and treated as well.    Avoid having sex until you and any partners have finished treatment and tests show that you are no longer infected.  Prevention    If you choose to have sex, make sure to practice safer sex. For instance, have sex with only one partner who only has sex with you. Ask your partner to be tested for STDs. Each time you have sex, use latex condoms. These help reduce the risk of STDs.    Avoid douching if advised to by your healthcare provider. Douching may increase the risk of PID.  Follow-up care  Follow up with your healthcare  provider, or as advised.  When to seek medical advice  Call your healthcare provider right away if any of these occur:    Fever of 100.4 F (38 C) or higher, or as directed by your healthcare provider    Symptoms do not improve after 3 days of treatment    New or increasing pain in your lower belly or back    Abnormal vaginal bleeding    Abnormal vaginal discharge    Weakness, dizziness or fainting    Nausea or vomiting    Trouble with urination or pain and burning during urination    Rash or joint pain    Painful open sores around the vagina    Swollen or painful lymph nodes in the groin (these may be felt as lumps in the groin)  Resources  To learn more about PID and STDs, contact:  The Centers for Disease Control and Prevention National STD Hotline  1-840.453.8006  http://www.cdc.gov/std/    2982-1361 [employer]. [address] [place] [address]. All rights reserved. This information is not intended as a substitute for professional medical care. Always follow your healthcare professional's instructions.             Review of your medicines      Our records show that you are taking the medicines listed below. If these are incorrect, please call your family doctor or clinic.        Dose / Directions Last dose taken    butalbital-acetaminophen-caffeine -40 MG per tablet   Commonly known as:  FIORICET/ESGIC   Quantity:  30 tablet        TAKE 1 TABLET BY MOUTH EVERY 4 HOURS AS NEEDED, MAX OF 6 TABLETS IN 24 HOURS   Refills:  0        clonazePAM 2 MG tablet   Commonly known as:  klonoPIN   Quantity:  30 tablet        Take 1 tab daily as needed for anxiety. * need clinic follow-up apptmt for further refills [phone]   Refills:  2        medroxyPROGESTERone 150 MG/ML injection   Commonly known as:  DEPO-PROVERA   Dose:  150 mg   Quantity:  1 mL        Inject 1 mL (150 mg) into the muscle every 3 months   Refills:  3        SUMAtriptan 100 MG tablet   Commonly known as:  IMITREX   Dose:  100  "mg   Quantity:  9 tablet        Take 1 tablet (100 mg) by mouth at onset of headache for migraine May repeat in 2 hours if needed: max 2/day; average number of headaches monthly   Refills:  3        traMADol 50 MG tablet   Commonly known as:  ULTRAM   Quantity:  10 tablet        TAKE 1 TABLET BY MOUTH EVERY 6 HOURS AS NEEDED FOR MODERATE PAIN   Refills:  0                Procedures and tests performed during your visit     CHLAMYDIA TRACHOMATIS PCR    HCG qualitative urine    NEISSERIA GONORRHOEA PCR    UA reflex to Microscopic and Culture    Wet prep      Orders Needing Specimen Collection     None      Pending Results     Date and Time Order Name Status Description    5/16/2017 2050 CHLAMYDIA TRACHOMATIS PCR In process     5/16/2017 2050 NEISSERIA GONORRHOEA PCR In process             Pending Culture Results     Date and Time Order Name Status Description    5/16/2017 2050 CHLAMYDIA TRACHOMATIS PCR In process     5/16/2017 2050 NEISSERIA GONORRHOEA PCR In process             Thank you for choosing [facility]       Thank you for choosing [facility] for your care. Our goal is always to provide you with excellent care. Hearing back from our patients is one way we can continue to improve our services. Please take a few minutes to complete the written survey that you may receive in the mail after you visit with us. Thank you!        [email or web address] Information     [email or web address] lets you send messages to your doctor, view your test results, renew your prescriptions, schedule appointments and more. To sign up, go to [email or web address] . Click on \"Log in\" on the left side of the screen, which will take you to the Welcome page. Then click on \"Sign up Now\" on the right side of the page.     You will be asked to enter the access code listed below, as well as some personal information. Please follow the directions to create your username and password.     Your access code is: [email or web address]  Expires: 8/14/2017  9:23 PM     Your access code " will  in 90 days. If you need help or a new code, please call your [facility] clinic or [phone].        Care EveryWhere ID     This is your Care EveryWhere ID. This could be used by other organizations to access your [facility] medical records  [id number]        After Visit Summary       This is your record. Keep this with you and show to your community pharmacist(s) and doctor(s) at your next visit.

## 2017-05-16 NOTE — ED AVS SNAPSHOT
[facility]    [address] [address]    Phone:  [phone]                                       Valorie Awad   MRN: [id number]    Department:  [facility]   Date of Visit:  5/16/2017           After Visit Summary Signature Page     I have received my discharge instructions, and my questions have been answered. I have discussed any challenges I see with this plan with the nurse or doctor.    ..........................................................................................................................................  Patient/Patient Representative Signature      ..........................................................................................................................................  Patient Representative Print Name and Relationship to Patient    ..................................................               ................................................  Date                                            Time    ..........................................................................................................................................  Reviewed by Signature/Title    ...................................................              ..............................................  Date                                                            Time

## 2017-05-17 NOTE — DISCHARGE INSTRUCTIONS
I am treating you as if you have a pelvic infection, though not all tests are back yet. If any of the tests come back positive, we will call you. Otherwise, follow up with your primary care provider for re-check in 3 days and consider getting your depo shot soon.         Pelvic Inflammatory Disease    Pelvic inflammatory disease (PID) is an infection of the female reproductive organs. These include the vagina, cervix, uterus, fallopian tubes, and ovaries.  PID is a common problem among women. It is most often caused by gonorrhea or chlamydia. These are bacterial infections that are spread through sex. For this reason, they are known as sexually transmitted diseases (STDs). PID can also be caused by other infections, though this is much less common.  When PID is found and treated early, it can often be cured. If not treated promptly, PID can lead to serious health problems. These include chronic pelvic pain and damage to the reproductive organs. PID can also lead to infertility. And it can increase the risk of tubal pregnancy. Mild cases of PID can often be treated at home. Severe cases of PID may need to be treated in the hospital.  Home care    You will likely be prescribed a combination of antibiotics. Be sure to take the medicines exactly as directed.    To help relieve pain, you may take over-the-counter pain medicine. Pain medicine may also be prescribed, if needed.    Inform any partners you ve had sex with about your condition. They will need to be tested for infection and treated as well.    Avoid having sex until you and any partners have finished treatment and tests show that you are no longer infected.  Prevention    If you choose to have sex, make sure to practice safer sex. For instance, have sex with only one partner who only has sex with you. Ask your partner to be tested for STDs. Each time you have sex, use latex condoms. These help reduce the risk of STDs.    Avoid douching if advised to by your  healthcare provider. Douching may increase the risk of PID.  Follow-up care  Follow up with your healthcare provider, or as advised.  When to seek medical advice  Call your healthcare provider right away if any of these occur:    Fever of 100.4 F (38 C) or higher, or as directed by your healthcare provider    Symptoms do not improve after 3 days of treatment    New or increasing pain in your lower belly or back    Abnormal vaginal bleeding    Abnormal vaginal discharge    Weakness, dizziness or fainting    Nausea or vomiting    Trouble with urination or pain and burning during urination    Rash or joint pain    Painful open sores around the vagina    Swollen or painful lymph nodes in the groin (these may be felt as lumps in the groin)  Resources  To learn more about PID and STDs, contact:  The Centers for Disease Control and Prevention National STD Hotline  1-334.352.5692  http://www.cdc.gov/std/    2873-4981 [employer]. [address] [place] [address], [place] [address]. All rights reserved. This information is not intended as a substitute for professional medical care. Always follow your healthcare professional's instructions.

## 2017-05-17 NOTE — ED NOTES
Pt presents with self with c/o vaginal bleeding, back pain, cramping, dizziness, and n/v. Pt states that she is on Depo birth control and is due for her next shot on 5/24. Pt states that today she felt some pain and passed a large clot, which hurt when she passed it, and then had a large amount of blood. Pt is concerned that she might have miscarried. Pt has not had a positive pregnancy test.

## 2017-05-18 LAB
C TRACH DNA SPEC QL NAA+PROBE: NORMAL
N GONORRHOEA DNA SPEC QL NAA+PROBE: NORMAL
SPECIMEN SOURCE: NORMAL
SPECIMEN SOURCE: NORMAL

## 2017-06-24 DIAGNOSIS — F41.9 ANXIETY: ICD-10-CM

## 2017-06-28 RX ORDER — CLONAZEPAM 2 MG/1
TABLET ORAL
Qty: 30 TABLET | Refills: 0 | OUTPATIENT
Start: 2017-06-28

## 2017-07-03 ENCOUNTER — TELEPHONE (OUTPATIENT)
Dept: OBGYN | Facility: OTHER | Age: 22
End: 2017-07-03

## 2017-07-06 ENCOUNTER — OFFICE VISIT (OUTPATIENT)
Dept: OBGYN | Facility: OTHER | Age: 22
End: 2017-07-06
Attending: NURSE PRACTITIONER
Payer: COMMERCIAL

## 2017-07-06 VITALS
SYSTOLIC BLOOD PRESSURE: 106 MMHG | HEIGHT: 66 IN | BODY MASS INDEX: 32.14 KG/M2 | WEIGHT: 200 LBS | DIASTOLIC BLOOD PRESSURE: 70 MMHG

## 2017-07-06 DIAGNOSIS — Z71.89 ACP (ADVANCE CARE PLANNING): Chronic | ICD-10-CM

## 2017-07-06 DIAGNOSIS — Z30.09 ENCOUNTER FOR COUNSELING REGARDING CONTRACEPTION: Primary | ICD-10-CM

## 2017-07-06 DIAGNOSIS — Z30.42 ENCOUNTER FOR SURVEILLANCE OF INJECTABLE CONTRACEPTIVE: ICD-10-CM

## 2017-07-06 LAB — HCG UR QL: NEGATIVE

## 2017-07-06 PROCEDURE — 99212 OFFICE O/P EST SF 10 MIN: CPT

## 2017-07-06 PROCEDURE — 96372 THER/PROPH/DIAG INJ SC/IM: CPT | Performed by: NURSE PRACTITIONER

## 2017-07-06 PROCEDURE — 99212 OFFICE O/P EST SF 10 MIN: CPT | Performed by: NURSE PRACTITIONER

## 2017-07-06 PROCEDURE — 81025 URINE PREGNANCY TEST: CPT | Mod: ZL | Performed by: NURSE PRACTITIONER

## 2017-07-06 NOTE — NURSING NOTE
The following medication was given:     MEDICATION: Depo Provera 150mg  ROUTE: IM  SITE: Deltoid - Left  DOSE: 150 mg  LOT #: [id number]  :  [employer]   EXPIRATION DATE:  11/2019  NDC#: 44528-59554  Next depo due 9/21/17 through 10/3/17  DANNY MARCUM

## 2017-07-06 NOTE — NURSING NOTE
"Chief Complaint   Patient presents with     Contraception       Initial /70  Ht 5' 6\" (1.676 m)  Wt 200 lb (90.7 kg)  BMI 32.28 kg/m2 Estimated body mass index is 32.28 kg/(m^2) as calculated from the following:    Height as of this encounter: 5' 6\" (1.676 m).    Weight as of this encounter: 200 lb (90.7 kg).  Medication Reconciliation: complete   KARLY CORBETT      "

## 2017-07-06 NOTE — PROGRESS NOTES
"[facility]                HPI   Valorie presents to discuss options for long term contraception.  She is currently using depo provera.  She denies side effects or concerns with this method.  She does come in late for her injections frequently. She did use Nexplanon in the past but did not like this. Pap and STD screenings are up to date.              Medications:     Current Outpatient Prescriptions Ordered in Epic   Medication     butalbital-acetaminophen-caffeine (FIORICET/ESGIC) -40 MG per tablet     traMADol (ULTRAM) 50 MG tablet     SUMAtriptan (IMITREX) 100 MG tablet     clonazePAM (KLONOPIN) 2 MG tablet     medroxyPROGESTERone (DEPO-PROVERA) 150 MG/ML injection     No current [facility]-ordered facility-administered medications on file.                 Allergies:   Bactrim [sulfamethoxazole w-trimethoprim]; Morphine hcl; Clindamycin; Nitrofurantoin; Nitrofurantoin monohydrate macrocrystals; and Loracarbef         Review of Systems:   The 5 point Review of Systems is negative other than noted in the HPI                     Physical Exam:   Blood pressure 106/70, height 5' 6\" (1.676 m), weight 200 lb (90.7 kg).  Constitutional:   awake, alert, cooperative, no apparent distress, and appears stated age              Data:     Results for orders placed or performed in visit on 07/06/17 (from the past 24 hour(s))   HCG qualitative urine   Result Value Ref Range    HCG Qual Urine Negative NEG             Assessment and Plan:   Contraceptive counseling - we have discussed options for long term contraception.  We have discussed the IUD's in depth as an option for the future.  She would like to continue with the Depo provera for now and will let me know if she decides on another option.      FÁTIMA Pagan  7/6/2017  10:09 AM  "

## 2017-07-06 NOTE — MR AVS SNAPSHOT
"              After Visit Summary   2017    Valorie Awad    MRN: [id number]           Patient Information     Date Of Birth          1995        Visit Information        Provider Department      2017 9:30 AM Sheree [facility]        Today's Diagnoses     Encounter for counseling regarding contraception    -  1    Encounter for surveillance of injectable contraceptive        ACP (advance care planning)          Care Instructions    Return in 90 days for injection          Follow-ups after your visit        Who to contact     If you have questions or need follow up information about today's clinic visit or your schedule please contact [facility] directly at [phone].  Normal or non-critical lab and imaging results will be communicated to you by Smarty Ringhart, letter or phone within 4 business days after the clinic has received the results. If you do not hear from us within 7 days, please contact the clinic through Smarty Ringhart or phone. If you have a critical or abnormal lab result, we will notify you by phone as soon as possible.  Submit refill requests through ConnectAndSell or call your pharmacy and they will forward the refill request to us. Please allow 3 business days for your refill to be completed.          Additional Information About Your Visit        MyChart Information     ConnectAndSell lets you send messages to your doctor, view your test results, renew your prescriptions, schedule appointments and more. To sign up, go to [email or web address] . Click on \"Log in\" on the left side of the screen, which will take you to the Welcome page. Then click on \"Sign up Now\" on the right side of the page.     You will be asked to enter the access code listed below, as well as some personal information. Please follow the directions to create your username and password.     Your access code is: [email or web address]  Expires: 2017  9:23 PM     Your access code will  in 90 days. If you " "need help or a new code, please call your [place] clinic or [phone].        Care EveryWhere ID     This is your Care EveryWhere ID. This could be used by other organizations to access your [place] medical records  [id number]        Your Vitals Were     Height BMI (Body Mass Index)                5' 6\" (1.676 m) 32.28 kg/m2           Blood Pressure from Last 3 Encounters:   07/06/17 106/70   05/16/17 131/93   03/22/17 108/72    Weight from Last 3 Encounters:   07/06/17 200 lb (90.7 kg)   03/22/17 207 lb (93.9 kg)   07/26/16 210 lb (95.3 kg)              We Performed the Following     HCG qualitative urine     THER/PROPH/DIAG INJ, SC/IM        Primary Care Provider Office Phone # Fax #    Bisi Muro [phone]       [facility] [place] [address]  [place] [address]        Equal Access to Services     MYLENE STROUD : Hadii aad ku hadasho Soomaali, waaxda luqadaha, qaybta kaalmada adeegyada, waxay idiin hayaan adeeg kharamarcela la'aan . So [facility] [phone].    ATENCIÓN: Si habla español, tiene a yao disposición servicios gratuitos de asistencia lingüística. Caterina al [phone].    We comply with applicable federal civil rights laws and [place] laws. We do not discriminate on the basis of race, color, national origin, age, disability sex, sexual orientation or gender identity.            Thank you!     Thank you for choosing [facility] [place]  for your care. Our goal is always to provide you with excellent care. Hearing back from our patients is one way we can continue to improve our services. Please take a few minutes to complete the written survey that you may receive in the mail after your visit with us. Thank you!             Your Updated Medication List - Protect others around you: Learn how to safely use, store and throw away your medicines at www.disposemymeds.org.          This list is accurate as of: 7/6/17 10:13 AM.  Always use your most recent med list.          "          Brand Name Dispense Instructions for use Diagnosis    butalbital-acetaminophen-caffeine -40 MG per tablet    FIORICET/ESGIC    30 tablet    TAKE 1 TABLET BY MOUTH EVERY 4 HOURS AS NEEDED, MAX OF 6 TABLETS IN 24 HOURS    Migraine with aura and without status migrainosus, not intractable       clonazePAM 2 MG tablet    klonoPIN    30 tablet    Take 1 tab daily as needed for anxiety. * need clinic follow-up apptmt for further refills [phone]    Anxiety       medroxyPROGESTERone 150 MG/ML injection    DEPO-PROVERA    1 mL    Inject 1 mL (150 mg) into the muscle every 3 months    Encounter for surveillance of injectable contraceptive       SUMAtriptan 100 MG tablet    IMITREX    9 tablet    Take 1 tablet (100 mg) by mouth at onset of headache for migraine May repeat in 2 hours if needed: max 2/day; average number of headaches monthly    Migraine without aura and without status migrainosus, not intractable       traMADol 50 MG tablet    ULTRAM    10 tablet    TAKE 1 TABLET BY MOUTH EVERY 6 HOURS AS NEEDED FOR MODERATE PAIN    Migraine with aura and without status migrainosus, not intractable

## 2017-07-11 ENCOUNTER — OFFICE VISIT (OUTPATIENT)
Dept: PSYCHIATRY | Facility: OTHER | Age: 22
End: 2017-07-11
Attending: PSYCHIATRY & NEUROLOGY
Payer: COMMERCIAL

## 2017-07-11 VITALS
BODY MASS INDEX: 32.14 KG/M2 | WEIGHT: 200 LBS | HEIGHT: 66 IN | SYSTOLIC BLOOD PRESSURE: 106 MMHG | DIASTOLIC BLOOD PRESSURE: 72 MMHG | TEMPERATURE: 97.8 F | RESPIRATION RATE: 20 BRPM | OXYGEN SATURATION: 98 % | HEART RATE: 108 BPM

## 2017-07-11 DIAGNOSIS — F41.9 ANXIETY: ICD-10-CM

## 2017-07-11 PROCEDURE — 99212 OFFICE O/P EST SF 10 MIN: CPT

## 2017-07-11 PROCEDURE — 99213 OFFICE O/P EST LOW 20 MIN: CPT | Performed by: PSYCHIATRY & NEUROLOGY

## 2017-07-11 RX ORDER — CLONAZEPAM 2 MG/1
2 TABLET ORAL DAILY PRN
Qty: 30 TABLET | Refills: 5 | Status: SHIPPED | OUTPATIENT
Start: 2017-07-11 | End: 2018-01-19

## 2017-07-11 ASSESSMENT — ANXIETY QUESTIONNAIRES
2. NOT BEING ABLE TO STOP OR CONTROL WORRYING: SEVERAL DAYS
1. FEELING NERVOUS, ANXIOUS, OR ON EDGE: MORE THAN HALF THE DAYS
7. FEELING AFRAID AS IF SOMETHING AWFUL MIGHT HAPPEN: NOT AT ALL
3. WORRYING TOO MUCH ABOUT DIFFERENT THINGS: MORE THAN HALF THE DAYS
IF YOU CHECKED OFF ANY PROBLEMS ON THIS QUESTIONNAIRE, HOW DIFFICULT HAVE THESE PROBLEMS MADE IT FOR YOU TO DO YOUR WORK, TAKE CARE OF THINGS AT HOME, OR GET ALONG WITH OTHER PEOPLE: SOMEWHAT DIFFICULT
6. BECOMING EASILY ANNOYED OR IRRITABLE: MORE THAN HALF THE DAYS
GAD7 TOTAL SCORE: 10
5. BEING SO RESTLESS THAT IT IS HARD TO SIT STILL: SEVERAL DAYS

## 2017-07-11 ASSESSMENT — PAIN SCALES - GENERAL: PAINLEVEL: NO PAIN (0)

## 2017-07-11 ASSESSMENT — PATIENT HEALTH QUESTIONNAIRE - PHQ9: 5. POOR APPETITE OR OVEREATING: MORE THAN HALF THE DAYS

## 2017-07-11 NOTE — MR AVS SNAPSHOT
"              After Visit Summary   2017    Valorie Awad    MRN: [id number]           Patient Information     Date Of Birth          1995        Visit Information        Provider Department      2017 9:00 AM Jyoti Leahy MD [facility]        Today's Diagnoses     Anxiety           Follow-ups after your visit        Who to contact     If you have questions or need follow up information about today's clinic visit or your schedule please contact [facility] directly at [phone].  Normal or non-critical lab and imaging results will be communicated to you by MyChart, letter or phone within 4 business days after the clinic has received the results. If you do not hear from us within 7 days, please contact the clinic through MyChart or phone. If you have a critical or abnormal lab result, we will notify you by phone as soon as possible.  Submit refill requests through Seebright or call your pharmacy and they will forward the refill request to us. Please allow 3 business days for your refill to be completed.          Additional Information About Your Visit        MyChart Information     Seebright lets you send messages to your doctor, view your test results, renew your prescriptions, schedule appointments and more. To sign up, go to [email or web address] . Click on \"Log in\" on the left side of the screen, which will take you to the Welcome page. Then click on \"Sign up Now\" on the right side of the page.     You will be asked to enter the access code listed below, as well as some personal information. Please follow the directions to create your username and password.     Your access code is: [email or web address]  Expires: 2017  9:23 PM     Your access code will  in 90 days. If you need help or a new code, please call your [facility] or [phone].        Care EveryWhere ID     This is your Care EveryWhere ID. This could be used by other organizations to access your " "[facility] medical records  [id number]        Your Vitals Were     Pulse Temperature Respirations Height Pulse Oximetry BMI (Body Mass Index)    108 97.8  F (36.6  C) (Tympanic) 20 5' 6\" (1.676 m) 98% 32.28 kg/m2       Blood Pressure from Last 3 Encounters:   07/11/17 106/72   07/06/17 106/70   05/16/17 131/93    Weight from Last 3 Encounters:   07/11/17 200 lb (90.7 kg)   07/06/17 200 lb (90.7 kg)   03/22/17 207 lb (93.9 kg)              Today, you had the following     No orders found for display         Today's Medication Changes          These changes are accurate as of: 7/11/17  9:31 AM.  If you have any questions, ask your nurse or doctor.               These medicines have changed or have updated prescriptions.        Dose/Directions    clonazePAM 2 MG tablet   Commonly known as:  klonoPIN   This may have changed:    - how much to take  - how to take this  - when to take this  - reasons to take this  - additional instructions   Used for:  Anxiety   Changed by:  Jyoti Leahy MD        Dose:  2 mg   Take 1 tablet (2 mg) by mouth daily as needed for anxiety   Quantity:  30 tablet   Refills:  5            Where to get your medicines      Some of these will need a paper prescription and others can be bought over the counter.  Ask your nurse if you have questions.     Bring a paper prescription for each of these medications     clonazePAM 2 MG tablet                Primary Care Provider Office Phone # Fax #    Bisi Muro [phone]       [facility] [place] [address]  [place] [address]        Equal Access to Services     [facility] AH: Hadii dilip espinoza hadasho Sonomi, waaxda luqadaha, qaybta kaalmada adeeggoldie, rafita idiphilomena barriga. So [facility] [phone].    ATENCIÓN: Si habla español, tiene a yao disposición servicios gratuitos de asistencia lingüística. Llame al [phone].    We comply with applicable federal civil rights laws and [place] laws. We do not " discriminate on the basis of race, color, national origin, age, disability sex, sexual orientation or gender identity.            Thank you!     [facility] you [facility] your [facility]. Our goal is always to provide you with excellent care. Hearing back from our patients is one way we can continue to improve our services. Please take a few minutes to complete the written survey that you may receive in the mail after your visit with us. Thank you!             Your Updated Medication List - Protect others around you: Learn how to safely use, store and throw away your medicines at www.disposemymeds.org.          This list is accurate as of: 7/11/17  9:31 AM.  Always use your most recent med list.                   Brand Name Dispense Instructions for use Diagnosis    butalbital-acetaminophen-caffeine -40 MG per tablet    FIORICET/ESGIC    30 tablet    TAKE 1 TABLET BY MOUTH EVERY 4 HOURS AS NEEDED, MAX OF 6 TABLETS IN 24 HOURS    Migraine with aura and without status migrainosus, not intractable       clonazePAM 2 MG tablet    klonoPIN    30 tablet    Take 1 tablet (2 mg) by mouth daily as needed for anxiety    Anxiety       medroxyPROGESTERone 150 MG/ML injection    DEPO-PROVERA    1 mL    Inject 1 mL (150 mg) into the muscle every 3 months    Encounter for surveillance of injectable contraceptive       SUMAtriptan 100 MG tablet    IMITREX    9 tablet    Take 1 tablet (100 mg) by mouth at onset of headache for migraine May repeat in 2 hours if needed: max 2/day; average number of headaches monthly    Migraine without aura and without status migrainosus, not intractable       traMADol 50 MG tablet    ULTRAM    10 tablet    TAKE 1 TABLET BY MOUTH EVERY 6 HOURS AS NEEDED FOR MODERATE PAIN    Migraine with aura and without status migrainosus, not intractable
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
## 2017-07-11 NOTE — NURSING NOTE
"Chief Complaint   Patient presents with     RECHECK     Mental health        Initial /72 (BP Location: Right arm, Patient Position: Chair, Cuff Size: Adult Large)  Pulse 108  Temp 97.8  F (36.6  C) (Tympanic)  Resp 20  Ht 5' 6\" (1.676 m)  Wt 200 lb (90.7 kg)  SpO2 98%  BMI 32.28 kg/m2 Estimated body mass index is 32.28 kg/(m^2) as calculated from the following:    Height as of this encounter: 5' 6\" (1.676 m).    Weight as of this encounter: 200 lb (90.7 kg).  Medication Reconciliation: complete   Camelia Bush    "

## 2017-07-12 ASSESSMENT — PATIENT HEALTH QUESTIONNAIRE - PHQ9: SUM OF ALL RESPONSES TO PHQ QUESTIONS 1-9: 7

## 2017-07-12 ASSESSMENT — ANXIETY QUESTIONNAIRES: GAD7 TOTAL SCORE: 10

## 2017-07-25 ENCOUNTER — HOSPITAL ENCOUNTER (EMERGENCY)
Facility: HOSPITAL | Age: 22
Discharge: HOME | End: 2017-07-25
Attending: PHYSICIAN ASSISTANT | Admitting: PHYSICIAN ASSISTANT
Payer: COMMERCIAL

## 2017-07-25 VITALS
TEMPERATURE: 98.1 F | RESPIRATION RATE: 16 BRPM | DIASTOLIC BLOOD PRESSURE: 75 MMHG | HEART RATE: 65 BPM | OXYGEN SATURATION: 97 % | SYSTOLIC BLOOD PRESSURE: 114 MMHG

## 2017-07-25 DIAGNOSIS — G43.001 MIGRAINE WITHOUT AURA AND WITH STATUS MIGRAINOSUS, NOT INTRACTABLE: ICD-10-CM

## 2017-07-25 PROCEDURE — 96361 HYDRATE IV INFUSION ADD-ON: CPT

## 2017-07-25 PROCEDURE — 96375 TX/PRO/DX INJ NEW DRUG ADDON: CPT

## 2017-07-25 PROCEDURE — 96374 THER/PROPH/DIAG INJ IV PUSH: CPT

## 2017-07-25 PROCEDURE — 25000128 H RX IP 250 OP 636: Performed by: PHYSICIAN ASSISTANT

## 2017-07-25 PROCEDURE — 99284 EMERGENCY DEPT VISIT MOD MDM: CPT | Performed by: PHYSICIAN ASSISTANT

## 2017-07-25 PROCEDURE — 99284 EMERGENCY DEPT VISIT MOD MDM: CPT | Mod: 25

## 2017-07-25 RX ORDER — KETOROLAC TROMETHAMINE 30 MG/ML
30 INJECTION, SOLUTION INTRAMUSCULAR; INTRAVENOUS ONCE
Status: COMPLETED | OUTPATIENT
Start: 2017-07-25 | End: 2017-07-25

## 2017-07-25 RX ORDER — DEXAMETHASONE SODIUM PHOSPHATE 10 MG/ML
10 INJECTION, SOLUTION INTRAMUSCULAR; INTRAVENOUS ONCE
Status: COMPLETED | OUTPATIENT
Start: 2017-07-25 | End: 2017-07-25

## 2017-07-25 RX ORDER — DIPHENHYDRAMINE HYDROCHLORIDE 50 MG/ML
50 INJECTION INTRAMUSCULAR; INTRAVENOUS ONCE
Status: COMPLETED | OUTPATIENT
Start: 2017-07-25 | End: 2017-07-25

## 2017-07-25 RX ADMIN — DEXAMETHASONE SODIUM PHOSPHATE 10 MG: 10 INJECTION INTRAMUSCULAR; INTRAVENOUS at 21:08

## 2017-07-25 RX ADMIN — SODIUM CHLORIDE 1000 ML: 9 INJECTION, SOLUTION INTRAVENOUS at 21:09

## 2017-07-25 RX ADMIN — DIPHENHYDRAMINE HYDROCHLORIDE 50 MG: 50 INJECTION, SOLUTION INTRAMUSCULAR; INTRAVENOUS at 21:08

## 2017-07-25 RX ADMIN — PROCHLORPERAZINE EDISYLATE 10 MG: 5 INJECTION INTRAMUSCULAR; INTRAVENOUS at 21:08

## 2017-07-25 RX ADMIN — KETOROLAC TROMETHAMINE 30 MG: 30 INJECTION, SOLUTION INTRAMUSCULAR; INTRAVENOUS at 21:08

## 2017-07-25 NOTE — ED AVS SNAPSHOT
[facility]    [address]    [place] [address]    Phone:  [phone]                                       Valorie Awad   MRN: [id number]    Department:  [facility]   Date of Visit:  7/25/2017           Patient Information     Date Of Birth          1995        Your diagnoses for this visit were:     Migraine without aura and with status migrainosus, not intractable        You were seen by Cecy Chen PA-C.      Follow-up Information     Follow up with Bisi Muro MD In 1 week.    Specialty:  Family Practice    Why:  As needed    Contact information:    [facility] [place]  [address]  [place] [address] [phone]          Follow up with [facility].    Specialty:  EMERGENCY MEDICINE    Why:  If symptoms worsen    Contact information:    [address]  [place] [address] [phone]    Additional information:    From [place]: Take [address]. Turn left at [address]. Turn left at the first stoplight on [address]. At the first stop sign, take a right onto [address]. Take a left into the parking lot and continue through until you reach the North enterance of the building.       From [place]: Take [address]. Take the [address] ramp towards [place]. Turn left onto [address]. Take a slight right onto [address]. Turn left at the first stoplight on [address]. At the first stop sign, take a right onto [address]. Take a left into the parking lot and continue through until you reach the North enterance of the building.       From [place]: Take [address]. Take a right at [address]. At the first stop sign, take a right onto [address]. Take a left into the parking lot and continue through until you reach the North enterance of the building.         Discharge Instructions       Continue current medications as prescribed. Increase  fluids and rest. No driving tonight. Return here with any new or worsening symptoms.       * Migraine Headache  Migraine headaches are related to changes in blood flow to the brain. This causes throbbing or constant pain on one or both sides of the head. The pain may last from a few hours to several days. There is usually nausea, vomiting, sensitivity to light and sound, and blurred vision. A migraine attack may be triggered by emotional stress, hormone changes during the menstrual cycle, oral contraceptives, alcohol use, certain foods containing tyramine, eye strain, weather changes, missing meals, or too little or too much sleep.  Home Care For This Headache:  1) If you were given pain medicine for this headache, do not drive yourself home . Arrange for a ride, instead. When you get home, try to sleep. You should feel much better when you wake up.  2) Migraine headaches may improve with an ice pack on the forehead or at the base of the skull. Heat to the back of your neck may relieve any neck spasm.  3) Drink only clear liquids or eat a very light diet to avoid nausea/vomiting until symptoms improve.  Preventing Future Headaches:  1) Pay attention to those factors that seem to trigger your headache. Try to avoid them when you can. If you have frequent headaches, it is useful to keep a diary of what you were doing, feeling or eating in the hours before each attack. Show this to your doctor to help find the cause of your headaches.  a) If you feel that stress is a factor in your headaches, look at the sources of stress in your life. Find ways to release the build-up of those stresses by using regular exercise, relaxation methods (yoga, meditation), bio-feedback or simply taking time-out for yourself. For more information about this, consult your doctor or go to a local bookstore and review books and tapes on this subject.  b) Tyramine is a substance present in the following foods : chocolate, yogurt, all cheeses  except cottage cheese and cream cheese. smoked or pickled fish and meat (including herring, caviar, bologna, pepperoni, salami), liver, avocados, bananas, figs, raisins, and red wine. Be aware that these foods may trigger a migraine in some persons. Try taking these foods out of your diet for 1-2 months to see if this reduces headache frequency.  Treating Future Attacks:  1) At the first sign of a migraine headache, take a medicine to stop it if one has been prescribed for you. If not, take acetaminophen (Tylenol) or ibuprofen (Motrin, Advil) if you are able to take these. The sooner you take medicine, the better it will work.  2) You may also want to find a quiet, dark, comfortable place to sit or lie down. Let yourself relax or sleep.  3) An ice pack on the forehead or area of greatest pain may also help.   Follow Up  with your doctor if the headache is not better within the next 24 hours. If you have frequent headaches you should discuss a treatment plan with your primary care doctor. Ask if you can have medicine to take at home the next time you get a bad headache. Poorly controlled chronic headaches may require a referral to a neurologist (headache specialist).  Get Prompt Medical Attention  if any of the following occur:    Your head pain gets worse, or does not improve within 24 hours    Repeated vomiting (can t keep liquids down)    Sinus or ear or throat pain (not already reported)    Fever of 101  F (38.3  C) or higher, or as directed by your healthcare provider    Stiff neck    Extreme drowsiness, confusion or fainting    Weakness of an arm or leg or one side of the face    Difficulty with speech or vision    7941-8022 [employer]. [address] [place] [address], [place] [address]. All rights reserved. This information is not intended as a substitute for professional medical care. Always follow your healthcare professional's instructions.             Review of your medicines      Our records show  that you are taking the medicines listed below. If these are incorrect, please call your family doctor or clinic.        Dose / Directions Last dose taken    butalbital-acetaminophen-caffeine -40 MG per tablet   Commonly known as:  FIORICET/ESGIC   Quantity:  30 tablet        TAKE 1 TABLET BY MOUTH EVERY 4 HOURS AS NEEDED, MAX OF 6 TABLETS IN 24 HOURS   Refills:  0        clonazePAM 2 MG tablet   Commonly known as:  klonoPIN   Dose:  2 mg   Quantity:  30 tablet        Take 1 tablet (2 mg) by mouth daily as needed for anxiety   Refills:  5        medroxyPROGESTERone 150 MG/ML injection   Commonly known as:  DEPO-PROVERA   Dose:  150 mg   Quantity:  1 mL        Inject 1 mL (150 mg) into the muscle every 3 months   Refills:  3        SUMAtriptan 100 MG tablet   Commonly known as:  IMITREX   Dose:  100 mg   Quantity:  9 tablet        Take 1 tablet (100 mg) by mouth at onset of headache for migraine May repeat in 2 hours if needed: max 2/day; average number of headaches monthly   Refills:  3        traMADol 50 MG tablet   Commonly known as:  ULTRAM   Quantity:  10 tablet        TAKE 1 TABLET BY MOUTH EVERY 6 HOURS AS NEEDED FOR MODERATE PAIN   Refills:  0                Orders Needing Specimen Collection     None      Pending Results     No orders found from 7/23/2017 to 7/26/2017.            Pending Culture Results     No orders found from 7/23/2017 to 7/26/2017.            Thank you for choosing [facility]       Thank you for choosing [facility] for your care. Our goal is always to provide you with excellent care. Hearing back from our patients is one way we can continue to improve our services. Please take a few minutes to complete the written survey that you may receive in the mail after you visit with us. Thank you!        [employer] Information     [employer] lets you send messages to your doctor, view your test results, renew your prescriptions, schedule appointments and more. To sign up, go to  [email or web address] . Click on \"Log in\" on the left side of the screen, which will take you to the Welcome page. Then click on \"Sign up Now\" on the right side of the page.     You will be asked to enter the access code listed below, as well as some personal information. Please follow the directions to create your username and password.     Your access code is: [email or web address]  Expires: 2017  9:23 PM     Your access code will  in 90 days. If you need help or a new code, please call your [facility] clinic or [phone].        Care EveryWhere ID     This is your Care EveryWhere ID. This could be used by other organizations to access your [facility] medical records  [id number]        Equal Access to Services     MYLENE STROUD : Titus Craig, christianne lindo, kira mandujanoalandres posada, rafita barriga. So [facility] [phone].    ATENCIÓN: Si habla español, tiene a yao disposición servicios gratuitos de asistencia lingüística. Caterina al [phone].    We comply with applicable federal civil rights laws and [place] laws. We do not discriminate on the basis of race, color, national origin, age, disability sex, sexual orientation or gender identity.            After Visit Summary       This is your record. Keep this with you and show to your community pharmacist(s) and doctor(s) at your next visit.                  "

## 2017-07-25 NOTE — ED AVS SNAPSHOT
[facility]    [address] [address]    Phone:  [phone]                                       Valorie Awad   MRN: [id number]    Department:  [facility]   Date of Visit:  7/25/2017           After Visit Summary Signature Page     I have received my discharge instructions, and my questions have been answered. I have discussed any challenges I see with this plan with the nurse or doctor.    ..........................................................................................................................................  Patient/Patient Representative Signature      ..........................................................................................................................................  Patient Representative Print Name and Relationship to Patient    ..................................................               ................................................  Date                                            Time    ..........................................................................................................................................  Reviewed by Signature/Title    ...................................................              ..............................................  Date                                                            Time

## 2017-07-26 NOTE — ED NOTES
"Pt comes in with report of migraine that \"won't go away\". Pt reports pain with vomiting yesterday from migraine.  Took home medications without relief.  Pt reports visual issues, and light sensitivity.   "

## 2017-07-26 NOTE — DISCHARGE INSTRUCTIONS
Continue current medications as prescribed. Increase fluids and rest. No driving tonight. Return here with any new or worsening symptoms.       * Migraine Headache  Migraine headaches are related to changes in blood flow to the brain. This causes throbbing or constant pain on one or both sides of the head. The pain may last from a few hours to several days. There is usually nausea, vomiting, sensitivity to light and sound, and blurred vision. A migraine attack may be triggered by emotional stress, hormone changes during the menstrual cycle, oral contraceptives, alcohol use, certain foods containing tyramine, eye strain, weather changes, missing meals, or too little or too much sleep.  Home Care For This Headache:  1) If you were given pain medicine for this headache, do not drive yourself home . Arrange for a ride, instead. When you get home, try to sleep. You should feel much better when you wake up.  2) Migraine headaches may improve with an ice pack on the forehead or at the base of the skull. Heat to the back of your neck may relieve any neck spasm.  3) Drink only clear liquids or eat a very light diet to avoid nausea/vomiting until symptoms improve.  Preventing Future Headaches:  1) Pay attention to those factors that seem to trigger your headache. Try to avoid them when you can. If you have frequent headaches, it is useful to keep a diary of what you were doing, feeling or eating in the hours before each attack. Show this to your doctor to help find the cause of your headaches.  a) If you feel that stress is a factor in your headaches, look at the sources of stress in your life. Find ways to release the build-up of those stresses by using regular exercise, relaxation methods (yoga, meditation), bio-feedback or simply taking time-out for yourself. For more information about this, consult your doctor or go to a local bookstore and review books and tapes on this subject.  b) Tyramine is a substance present in the  following foods : chocolate, yogurt, all cheeses except cottage cheese and cream cheese. smoked or pickled fish and meat (including herring, caviar, bologna, pepperoni, salami), liver, avocados, bananas, figs, raisins, and red wine. Be aware that these foods may trigger a migraine in some persons. Try taking these foods out of your diet for 1-2 months to see if this reduces headache frequency.  Treating Future Attacks:  1) At the first sign of a migraine headache, take a medicine to stop it if one has been prescribed for you. If not, take acetaminophen (Tylenol) or ibuprofen (Motrin, Advil) if you are able to take these. The sooner you take medicine, the better it will work.  2) You may also want to find a quiet, dark, comfortable place to sit or lie down. Let yourself relax or sleep.  3) An ice pack on the forehead or area of greatest pain may also help.   Follow Up  with your doctor if the headache is not better within the next 24 hours. If you have frequent headaches you should discuss a treatment plan with your primary care doctor. Ask if you can have medicine to take at home the next time you get a bad headache. Poorly controlled chronic headaches may require a referral to a neurologist (headache specialist).  Get Prompt Medical Attention  if any of the following occur:    Your head pain gets worse, or does not improve within 24 hours    Repeated vomiting (can t keep liquids down)    Sinus or ear or throat pain (not already reported)    Fever of 101  F (38.3  C) or higher, or as directed by your healthcare provider    Stiff neck    Extreme drowsiness, confusion or fainting    Weakness of an arm or leg or one side of the face    Difficulty with speech or vision    3628-7751 [employer]. [address] [place] [address], [place] [address]. All rights reserved. This information is not intended as a substitute for professional medical care. Always follow your healthcare professional's instructions.

## 2017-07-26 NOTE — ED NOTES
Discharge instructions reviewed with patient, and patient verbalized understanding. Patient ambulated with a steady gait to the exit.

## 2017-07-27 ASSESSMENT — ENCOUNTER SYMPTOMS
SORE THROAT: 0
CHILLS: 0
EYE REDNESS: 0
FACIAL ASYMMETRY: 0
DIZZINESS: 0
NUMBNESS: 0
TREMORS: 0
NAUSEA: 0
PHOTOPHOBIA: 1
RESPIRATORY NEGATIVE: 1
VOMITING: 0
SPEECH DIFFICULTY: 0
NECK STIFFNESS: 0
HEADACHES: 1
CARDIOVASCULAR NEGATIVE: 1
WEAKNESS: 0
SEIZURES: 0
FEVER: 0

## 2017-07-27 NOTE — ED PROVIDER NOTES
"  History     Chief Complaint   Patient presents with     Headache     since Sunday. states that she has tried imatrex and tramadol without relief. states \"I think I need fluids because I think I am dehydrated.\"      HPI  Valorie Awad is a 22 year old female who presents with headache behind eyes and photophobia x 3 days. States h/o migraines and this one feels similar to priors. Denies headache as being sudden in onset or the \"worst HA of her life.\" Tried imitrex, tramadol, and fioricet at home w/o relief. Denies recent head trauma.    I have reviewed the Medications, Allergies, Past Medical and Surgical History, and Social History in the [employer] system.    Past Medical History:   Past Medical History:   Diagnosis Date     Abnormal Pap smear      Anxiety 9/20/2001     Marijuana use      Migraine headaches 10/10/2007     Supervision of other normal pregnancy        Past Surgical History:   Procedure Laterality Date     Comminuted fracture elbow Left 10/25/2013    lauren placed     CYSTOSCOPY      bladder distention     TONSILLECTOMY         Social History     Social History     Marital status: Single     Spouse name: N/A     Number of children: N/A     Years of education: N/A     Occupational History     student      Social History Main Topics     Smoking status: Never Smoker     Smokeless tobacco: Never Used     Alcohol use No     Drug use: No     Sexual activity: Yes     Partners: Male     Other Topics Concern      Service No     Blood Transfusions Yes     Caffeine Concern No     Seat Belt Yes     Parent/Sibling W/ Cabg, Mi Or Angioplasty Before 65f 55m? No     Social History Narrative       Discharge Medication List as of 7/25/2017 10:08 PM      CONTINUE these medications which have NOT CHANGED    Details   traMADol (ULTRAM) 50 MG tablet TAKE 1 TABLET BY MOUTH EVERY 6 HOURS AS NEEDED FOR MODERATE PAIN, Disp-10 tablet, R-0, Local Print      SUMAtriptan (IMITREX) 100 MG tablet Take 1 tablet (100 mg) by mouth at " onset of headache for migraine May repeat in 2 hours if needed: max 2/day; average number of headaches monthly, Disp-9 tablet, R-3, Local Print      clonazePAM (KLONOPIN) 2 MG tablet Take 1 tablet (2 mg) by mouth daily as needed for anxiety, Disp-30 tablet, R-5, Local Print      butalbital-acetaminophen-caffeine (FIORICET/ESGIC) -40 MG per tablet TAKE 1 TABLET BY MOUTH EVERY 4 HOURS AS NEEDED, MAX OF 6 TABLETS IN 24 HOURS, Disp-30 tablet, R-0, Local Print      medroxyPROGESTERone (DEPO-PROVERA) 150 MG/ML injection Inject 1 mL (150 mg) into the muscle every 3 months, Disp-1 mL, R-3, Injection             Allergies: Bactrim [sulfamethoxazole w-trimethoprim]; Morphine hcl; Clindamycin; Nitrofurantoin; Nitrofurantoin monohydrate macrocrystals; and Loracarbef      Review of Systems   Constitutional: Negative for chills and fever.   HENT: Negative for sore throat.    Eyes: Positive for photophobia. Negative for redness and visual disturbance.   Respiratory: Negative.    Cardiovascular: Negative.    Gastrointestinal: Negative for nausea and vomiting.   Musculoskeletal: Negative for neck stiffness.   Skin: Negative for rash.   Neurological: Positive for headaches. Negative for dizziness, tremors, seizures, syncope, facial asymmetry, speech difficulty, weakness and numbness.   All other systems reviewed and are negative.      Physical Exam   BP: 134/81  Pulse: 89  Temp: 98.2  F (36.8  C)  Resp: 18  SpO2: 99 %  Physical Exam   Constitutional: She is oriented to person, place, and time. She appears well-developed and well-nourished.  Non-toxic appearance. She does not have a sickly appearance. She does not appear ill. She appears distressed.   HENT:   Head: Normocephalic and atraumatic.   Right Ear: Hearing, tympanic membrane, external ear and ear canal normal.   Left Ear: Hearing, tympanic membrane, external ear and ear canal normal.   Nose: Nose normal. Right sinus exhibits no maxillary sinus tenderness and no frontal  sinus tenderness. Left sinus exhibits no maxillary sinus tenderness and no frontal sinus tenderness.   Mouth/Throat: Uvula is midline, oropharynx is clear and moist and mucous membranes are normal. No oropharyngeal exudate.   Eyes: Conjunctivae and EOM are normal. Pupils are equal, round, and reactive to light. Right eye exhibits no discharge. Left eye exhibits no discharge. No scleral icterus.   Fundoscopic exam:       The right eye shows no papilledema.        The left eye shows no papilledema.   Neck: Trachea normal, normal range of motion and full passive range of motion without pain. Neck supple. No Brudzinski's sign and no Kernig's sign noted. No thyroid mass and no thyromegaly present.   Cardiovascular: Normal rate, regular rhythm, normal heart sounds and intact distal pulses.  Exam reveals no gallop and no friction rub.    No murmur heard.  Pulmonary/Chest: Effort normal and breath sounds normal. No respiratory distress. She has no wheezes. She has no rales. She exhibits no tenderness.   Musculoskeletal: Normal range of motion. She exhibits no edema.   Lymphadenopathy:     She has no cervical adenopathy.   Neurological: She is alert and oriented to person, place, and time. She has normal strength and normal reflexes. She displays no atrophy and no tremor. No cranial nerve deficit or sensory deficit. She exhibits normal muscle tone. She displays a negative Romberg sign. She displays no seizure activity. Coordination and gait normal. GCS eye subscore is 4. GCS verbal subscore is 5. GCS motor subscore is 6.   Skin: Skin is warm and dry. No rash noted. She is not diaphoretic. No erythema. No pallor.   Psychiatric: She has a normal mood and affect. Her behavior is normal. Judgment and thought content normal.   Nursing note and vitals reviewed.      ED Course     ED Course     Procedures             Labs Ordered and Resulted from Time of ED Arrival Up to the Time of Departure from the ED - No data to  display    Assessments & Plan (with Medical Decision Making)   Valorie was given the below migraine medications IV and a 1 liter bolus of NS. She states she is feeling much better following and wishes to go home. She was discharged home in good condition.      Medications   prochlorperazine (COMPAZINE) injection 10 mg (10 mg Intravenous Given 7/25/17 2108)   ketorolac (TORADOL) injection 30 mg (30 mg Intravenous Given 7/25/17 2108)   diphenhydrAMINE (BENADRYL) injection 50 mg (50 mg Intravenous Given 7/25/17 2108)   dexamethasone (DECADRON) injection 10 mg (10 mg Intravenous Given 7/25/17 2108)   0.9% sodium chloride BOLUS (0 mLs Intravenous Stopped 7/25/17 2208)     Plan: Continue current medications as prescribed. Increase fluids and rest. No driving tonight. Return here with any new or worsening symptoms.     I have reviewed the nursing notes.    I have reviewed the findings, diagnosis, plan and need for follow up with the patient.    Discharge Medication List as of 7/25/2017 10:08 PM          Final diagnoses:   Migraine without aura and with status migrainosus, not intractable       7/25/2017   [facility]     Cecy Chen PA-C  07/27/17 4164

## 2017-08-15 DIAGNOSIS — G43.109 MIGRAINE WITH AURA AND WITHOUT STATUS MIGRAINOSUS, NOT INTRACTABLE: ICD-10-CM

## 2017-08-15 RX ORDER — TRAMADOL HYDROCHLORIDE 50 MG/1
TABLET ORAL
Qty: 10 TABLET | Refills: 0 | OUTPATIENT
Start: 2017-08-15

## 2017-08-15 NOTE — TELEPHONE ENCOUNTER
Ultram      Last Written Prescription Date:  3.22.17  Last Fill Quantity: #10,   # refills: 0  Last Office Visit with [facility] or  Health prescribing provider: 3.22.17 with Dr. Fu.  Future Office visit:       Routing refill request to provider for review/approval because:  Drug not on the [facility], [facility] or [facility] refill protocol or controlled substance

## 2017-08-15 NOTE — TELEPHONE ENCOUNTER
I have not prescribed any narcotics for this patient and would not continue with tramadol or fioricet, I think Dr Fu did and would prefer he review this.

## 2017-09-22 DIAGNOSIS — Z30.42 ENCOUNTER FOR SURVEILLANCE OF INJECTABLE CONTRACEPTIVE: ICD-10-CM

## 2017-09-22 RX ORDER — MEDROXYPROGESTERONE ACETATE 150 MG/ML
150 INJECTION, SUSPENSION INTRAMUSCULAR
Qty: 1 ML | Refills: 0 | OUTPATIENT
Start: 2017-09-22 | End: 2018-03-06 | Stop reason: ALTCHOICE

## 2017-09-22 NOTE — TELEPHONE ENCOUNTER
Pt was in seen in clinic on 7/6/2017 by Sheree Osborn for surveillance of Depo Provera. Sheree did not refill her Rx that day due to still having another refill. Pt is due for her Depo next week with no refills available. Will you please sign for one Depo shot and the Pt will schedule a follow up appt with Sheree to get more refills?

## 2017-09-25 ENCOUNTER — ALLIED HEALTH/NURSE VISIT (OUTPATIENT)
Dept: OBGYN | Facility: OTHER | Age: 22
End: 2017-09-25
Attending: NURSE PRACTITIONER
Payer: COMMERCIAL

## 2017-09-25 DIAGNOSIS — Z30.09 FAMILY PLANNING: Primary | ICD-10-CM

## 2017-09-25 PROCEDURE — 96372 THER/PROPH/DIAG INJ SC/IM: CPT

## 2017-09-25 NOTE — MR AVS SNAPSHOT
"              After Visit Summary   2017    Valorie Awad    MRN: [id number]           Patient Information     Date Of Birth          1995        Visit Information        Provider Department      2017 10:00 AM HC OB/GYN NURSE [facility]        Today's Diagnoses     Family planning    -  1       Follow-ups after your visit        Who to contact     If you have questions or need follow up information about today's clinic visit or your schedule please contact [facility] directly at [phone].  Normal or non-critical lab and imaging results will be communicated to you by Kairoshart, letter or phone within 4 business days after the clinic has received the results. If you do not hear from us within 7 days, please contact the clinic through Kairoshart or phone. If you have a critical or abnormal lab result, we will notify you by phone as soon as possible.  Submit refill requests through Kivra or call your pharmacy and they will forward the refill request to us. Please allow 3 business days for your refill to be completed.          Additional Information About Your Visit        MyChart Information     Kivra lets you send messages to your doctor, view your test results, renew your prescriptions, schedule appointments and more. To sign up, go to [email or web address] . Click on \"Log in\" on the left side of the screen, which will take you to the Welcome page. Then click on \"Sign up Now\" on the right side of the page.     You will be asked to enter the access code listed below, as well as some personal information. Please follow the directions to create your username and password.     Your access code is: [email or web address]  Expires: 2017 10:30 AM     Your access code will  in 90 days. If you need help or a new code, please call your [facility] or [phone].        Care EveryWhere ID     This is your Care EveryWhere ID. This could be used by other organizations to " access your [facility] medical records  [id number]         Blood Pressure from Last 3 Encounters:   07/25/17 114/75   07/11/17 106/72   07/06/17 106/70    Weight from Last 3 Encounters:   07/11/17 200 lb (90.7 kg)   07/06/17 200 lb (90.7 kg)   03/22/17 207 lb (93.9 kg)              We Performed the Following     C Medroxyprogesterone inj/1mg     INJECTION INTRAMUSCULAR OR SUB-Q        Primary Care Provider Office Phone # Fax #    Bisi Muro [phone]       [facility] [place] [address]  [place] [address]        Equal Access to Services     RAÚL STROUD : Hadii dilip acostao Sonomi, waaxda luqadaha, qaybta kaalmada aderadhayada, rafita larkin . So [facility] [phone].    ATENCIÓN: Si habla español, tiene a yao disposición servicios gratuitos de asistencia lingüística. Llame al [phone].    We comply with applicable federal civil rights laws and [place] laws. We do not discriminate on the basis of race, color, national origin, age, disability sex, sexual orientation or gender identity.            Thank you!     Thank you for choosing [facility]  for your care. Our goal is always to provide you with excellent care. Hearing back from our patients is one way we can continue to improve our services. Please take a few minutes to complete the written survey that you may receive in the mail after your visit with us. Thank you!             Your Updated Medication List - Protect others around you: Learn how to safely use, store and throw away your medicines at www.disposemymeds.org.          This list is accurate as of: 9/25/17 10:30 AM.  Always use your most recent med list.                   Brand Name Dispense Instructions for use Diagnosis    butalbital-acetaminophen-caffeine -40 MG per tablet    FIORICET/ESGIC    30 tablet    TAKE 1 TABLET BY MOUTH EVERY 4 HOURS AS NEEDED, MAX OF 6 TABLETS IN 24 HOURS    Migraine with aura and without  status migrainosus, not intractable       clonazePAM 2 MG tablet    klonoPIN    30 tablet    Take 1 tablet (2 mg) by mouth daily as needed for anxiety    Anxiety       medroxyPROGESTERone 150 MG/ML injection    DEPO-PROVERA    1 mL    Inject 1 mL (150 mg) into the muscle every 3 months    Encounter for surveillance of injectable contraceptive       SUMAtriptan 100 MG tablet    IMITREX    9 tablet    Take 1 tablet (100 mg) by mouth at onset of headache for migraine May repeat in 2 hours if needed: max 2/day; average number of headaches monthly    Migraine without aura and without status migrainosus, not intractable       traMADol 50 MG tablet    ULTRAM    10 tablet    TAKE 1 TABLET BY MOUTH EVERY 6 HOURS AS NEEDED FOR MODERATE PAIN    Migraine with aura and without status migrainosus, not intractable

## 2017-09-25 NOTE — PROGRESS NOTES
The following medication was given:     MEDICATION: Depo Provera 150mg  ROUTE: IM  SITE: Deltoid - Left  DOSE: 150mg  LOT #: [id number]  :  [employer]   EXPIRATION DATE:  02/2020  NDC#: [address]-0    -Pt is due for next depo shot Decemeber 11-December 25th  Yadira Lamas

## 2017-12-16 ENCOUNTER — HOSPITAL ENCOUNTER (EMERGENCY)
Facility: HOSPITAL | Age: 22
Discharge: HOME | End: 2017-12-16
Attending: NURSE PRACTITIONER | Admitting: NURSE PRACTITIONER
Payer: COMMERCIAL

## 2017-12-16 VITALS
OXYGEN SATURATION: 99 % | DIASTOLIC BLOOD PRESSURE: 68 MMHG | TEMPERATURE: 97.7 F | SYSTOLIC BLOOD PRESSURE: 121 MMHG | RESPIRATION RATE: 16 BRPM

## 2017-12-16 DIAGNOSIS — J06.9 VIRAL URI WITH COUGH: ICD-10-CM

## 2017-12-16 LAB
FLUAV+FLUBV AG SPEC QL: NEGATIVE
FLUAV+FLUBV AG SPEC QL: NEGATIVE
SPECIMEN SOURCE: NORMAL

## 2017-12-16 PROCEDURE — 99213 OFFICE O/P EST LOW 20 MIN: CPT

## 2017-12-16 PROCEDURE — 87804 INFLUENZA ASSAY W/OPTIC: CPT | Performed by: NURSE PRACTITIONER

## 2017-12-16 PROCEDURE — 99213 OFFICE O/P EST LOW 20 MIN: CPT | Performed by: NURSE PRACTITIONER

## 2017-12-16 RX ORDER — BENZONATATE 100 MG/1
100 CAPSULE ORAL 3 TIMES DAILY PRN
Qty: 30 CAPSULE | Refills: 0 | Status: SHIPPED | OUTPATIENT
Start: 2017-12-16 | End: 2018-01-19

## 2017-12-16 RX ORDER — ALBUTEROL SULFATE 90 UG/1
2 AEROSOL, METERED RESPIRATORY (INHALATION) EVERY 6 HOURS PRN
Qty: 1 INHALER | Refills: 0 | Status: SHIPPED | OUTPATIENT
Start: 2017-12-16 | End: 2018-07-19

## 2017-12-16 ASSESSMENT — ENCOUNTER SYMPTOMS
ACTIVITY CHANGE: 0
SINUS PAIN: 0
ABDOMINAL PAIN: 0
DYSURIA: 0
WEAKNESS: 0
NUMBNESS: 0
SHORTNESS OF BREATH: 0
WHEEZING: 0
SORE THROAT: 0
RHINORRHEA: 0
STRIDOR: 0
CHILLS: 0
SPEECH DIFFICULTY: 0
NAUSEA: 0
FACIAL ASYMMETRY: 0
FEVER: 0
PSYCHIATRIC NEGATIVE: 1
TROUBLE SWALLOWING: 0
APPETITE CHANGE: 0
COUGH: 1
DIARRHEA: 0
SINUS PRESSURE: 0
VOMITING: 0
DIZZINESS: 1
HEADACHES: 0

## 2017-12-16 NOTE — ED AVS SNAPSHOT
[facility]    [address]    [place] [address]    Phone:  [phone]                                       Valorie Awad   MRN: [id number]    Department:  [facility]   Date of Visit:  12/16/2017           Patient Information     Date Of Birth          1995        Your diagnoses for this visit were:     Viral URI with cough        You were seen by Nu Burger NP.      Follow-up Information     Follow up with Bisi Muro MD.    Specialty:  Family Practice    Why:  As needed, If symptoms worsen    Contact information:    [facility] [place]  [address]  [place] [address] [phone]          Follow up with [facility].    Specialty:  EMERGENCY MEDICINE    Why:  As needed, If symptoms worsen    Contact information:    [address]  [place] [address] [phone]    Additional information:    From [place]: Take [address]. Turn left at [address]. Turn left at the first stoplight on [address]. At the first stop sign, take a right onto [address]. Take a left into the parking lot and continue through until you reach the North enterance of the building.       From [place]: Take [address]. Take the [address] ramp towards [place]. Turn left onto [address]. Take a slight right onto [address]. Turn left at the first stoplight on [address]. At the first stop sign, take a right onto [address]. Take a left into the parking lot and continue through until you reach the North enterance of the building.       From [place]: Take [address]. Take a right at [address]. At the first stop sign, take a right onto [address]. Take a left into the parking lot and continue through until you reach the North enterance of the building.         Discharge Instructions       Take Tessalon as directed as needed for coughing.   Use Albuterol inhaler as  directed as needed for cough, shortness of breathe or wheezing.   Take tylenol and/or ibuprofen for fever. Follow dosing on package.   Increase fluid intake.   Rest.   Follow up with PCP with any increase in symptoms or concerns.   Return to urgent care or emergency department with any increase in symptoms or concerns.       Discharge References/Attachments     URI, VIRAL, NO ABX (ADULT) (ENGLISH)      Future Appointments        Provider Department Dept Phone Center    1/19/2018 9:40 AM Jyoti Leahy MD [facility] [phone] [facility]         Review of your medicines      START taking        Dose / Directions Last dose taken    albuterol 108 (90 BASE) MCG/ACT Inhaler   Commonly known as:  PROAIR HFA/PROVENTIL HFA/VENTOLIN HFA   Dose:  2 puff   Quantity:  1 Inhaler        Inhale 2 puffs into the lungs every 6 hours as needed for shortness of breath / dyspnea or wheezing   Refills:  0        benzonatate 100 MG capsule   Commonly known as:  TESSALON   Dose:  100 mg   Quantity:  30 capsule        Take 1 capsule (100 mg) by mouth 3 times daily as needed for cough   Refills:  0          Our records show that you are taking the medicines listed below. If these are incorrect, please call your family doctor or clinic.        Dose / Directions Last dose taken    butalbital-acetaminophen-caffeine -40 MG per tablet   Commonly known as:  FIORICET/ESGIC   Quantity:  30 tablet        TAKE 1 TABLET BY MOUTH EVERY 4 HOURS AS NEEDED, MAX OF 6 TABLETS IN 24 HOURS   Refills:  0        clonazePAM 2 MG tablet   Commonly known as:  klonoPIN   Dose:  2 mg   Quantity:  30 tablet        Take 1 tablet (2 mg) by mouth daily as needed for anxiety   Refills:  5        medroxyPROGESTERone 150 MG/ML injection   Commonly known as:  DEPO-PROVERA   Dose:  150 mg   Quantity:  1 mL        Inject 1 mL (150 mg) into the muscle every 3 months   Refills:  0        SUMAtriptan 100 MG tablet   Commonly known as:  IMITREX   Dose:   "100 mg   Quantity:  9 tablet        Take 1 tablet (100 mg) by mouth at onset of headache for migraine May repeat in 2 hours if needed: max 2/day; average number of headaches monthly   Refills:  3        traMADol 50 MG tablet   Commonly known as:  ULTRAM   Quantity:  10 tablet        TAKE 1 TABLET BY MOUTH EVERY 6 HOURS AS NEEDED FOR MODERATE PAIN   Refills:  0        TYLENOL PO   Dose:  1000 mg        Take 1,000 mg by mouth every 6 hours as needed for mild pain or fever   Refills:  0                Prescriptions were sent or printed at these locations (2 Prescriptions)                   [facility] [place] [place] - [address] AT [facility] [facility] [address]   [address], [place] [address]    Telephone:  [phone]   Fax:  [phone]   Hours:                  E-Prescribed (2 of 2)         benzonatate (TESSALON) 100 MG capsule               albuterol (PROAIR HFA/PROVENTIL HFA/VENTOLIN HFA) 108 (90 BASE) MCG/ACT Inhaler                Procedures and tests performed during your visit     Influenza A/B antigen      Orders Needing Specimen Collection     None      Pending Results     No orders found from 12/14/2017 to 12/17/2017.            Pending Culture Results     No orders found from 12/14/2017 to 12/17/2017.            Thank you for choosing [facility]       Thank you for choosing [facility] for your care. Our goal is always to provide you with excellent care. Hearing back from our patients is one way we can continue to improve our services. Please take a few minutes to complete the written survey that you may receive in the mail after you visit with us. Thank you!        [employer] Information     [employer] lets you send messages to your doctor, view your test results, renew your prescriptions, schedule appointments and more. To sign up, go to [email or web address] . Click on \"Log in\" on the left side of the screen, which will take you to the Welcome page. Then click on \"Sign up Now\" on the right " side of the page.     You will be asked to enter the access code listed below, as well as some personal information. Please follow the directions to create your username and password.     Your access code is: [email or web address]  Expires: 2017  9:30 AM     Your access code will  in 90 days. If you need help or a new code, please call your [facility] clinic or [phone].        Care EveryWhere ID     This is your Care EveryWhere ID. This could be used by other organizations to access your [facility] medical records  [id number]        Equal Access to Services     [facility]: Hadspring Craig, christianne lindo, kira posada, rafita larkin . So [facility] [phone].    ATENCIÓN: Si habla español, tiene a yao disposición servicios gratuitos de asistencia lingüística. Llame al [phone].    We comply with applicable federal civil rights laws and [place] laws. We do not discriminate on the basis of race, color, national origin, age, disability, sex, sexual orientation, or gender identity.            After Visit Summary       This is your record. Keep this with you and show to your community pharmacist(s) and doctor(s) at your next visit.

## 2017-12-16 NOTE — ED NOTES
Pt ambulated independently into room. Pt reports all of her symptoms started yesterday, moist cough, chest congestion, dizziness, fever and shaking chills, headache, itchy throat, aching all over. Pt took tylenol 1000 mg at 0930.

## 2017-12-16 NOTE — DISCHARGE INSTRUCTIONS
Take Tessalon as directed as needed for coughing.   Use Albuterol inhaler as directed as needed for cough, shortness of breathe or wheezing.   Take tylenol and/or ibuprofen for fever. Follow dosing on package.   Increase fluid intake.   Rest.   Follow up with PCP with any increase in symptoms or concerns.   Return to urgent care or emergency department with any increase in symptoms or concerns.

## 2017-12-16 NOTE — ED PROVIDER NOTES
History     Chief Complaint   Patient presents with     Cough     started yesterday, felt warm, chest tight when breaths     Dizziness     started yesterday     The history is provided by the patient. No  was used.     Valorie Awad is a 22 year old female who presents with a cough and dizziness that started yesterday. She's taken tylenol with mild effectiveness. She felt feverish, but didn't check her temperature. Denies night sweats. Positive for chills. Eating and drinking well. Bowel and bladder are working well. No antibiotic use in the past 30 days. She is not pregnant. She did not get an influenza vaccine this year. She is a non tobacco user. Denies head injury or trauma.       Problem List:    Patient Active Problem List    Diagnosis Date Noted     ACP (advance care planning) 03/22/2017     Priority: Medium     Advance Care Planning 7/6/2017: ACP Review of Chart / Resources Provided:  Reviewed chart for advance care plan.  Valorie Awad has no plan or code status on file. Discussed available resources and provided with information.   Added by KARLY CORBETT                 Spasm of muscle 08/12/2015     Priority: Medium     Migraine 10/10/2007     Priority: Medium     Problem list name updated by automated process. Provider to review       Anxiety state 09/20/2001     Priority: Medium     Problem list name updated by automated process. Provider to review          Past Medical History:    Past Medical History:   Diagnosis Date     Abnormal Pap smear      Anxiety 9/20/2001     Marijuana use      Migraine headaches 10/10/2007     Supervision of other normal pregnancy        Past Surgical History:    Past Surgical History:   Procedure Laterality Date     Comminuted fracture elbow Left 10/25/2013    lauren placed     CYSTOSCOPY      bladder distention     TONSILLECTOMY         Family History:    Family History   Problem Relation Age of Onset     Other - See Comments Father      alcoholism      Depression Father      MENTAL ILLNESS Father      Depression Mother      Irritable Bowel Syndrome Mother      MENTAL ILLNESS Mother      Other - See Comments Mother      migraine headache     Other - See Comments Maternal Grandmother      blood disease     Lipids Maternal Grandmother      hyperlipidemia     Thyroid Disease Maternal Grandmother      DIABETES No family hx of      Asthma No family hx of        Social History:  Marital Status:  Single [1]  Social History   Substance Use Topics     Smoking status: Never Smoker     Smokeless tobacco: Never Used     Alcohol use 0.0 oz/week     0 Standard drinks or equivalent per week      Comment: social        Medications:      Acetaminophen (TYLENOL PO)   benzonatate (TESSALON) 100 MG capsule   albuterol (PROAIR HFA/PROVENTIL HFA/VENTOLIN HFA) 108 (90 BASE) MCG/ACT Inhaler   clonazePAM (KLONOPIN) 2 MG tablet   butalbital-acetaminophen-caffeine (FIORICET/ESGIC) -40 MG per tablet   traMADol (ULTRAM) 50 MG tablet   medroxyPROGESTERone (DEPO-PROVERA) 150 MG/ML injection   SUMAtriptan (IMITREX) 100 MG tablet         Review of Systems   Constitutional: Negative for activity change, appetite change, chills and fever.   HENT: Positive for congestion. Negative for ear discharge, ear pain, postnasal drip, rhinorrhea, sinus pain, sinus pressure, sore throat and trouble swallowing.    Eyes: Negative for visual disturbance.   Respiratory: Positive for cough. Negative for shortness of breath, wheezing and stridor.         Sore lungs from coughing.    Cardiovascular: Negative for chest pain.   Gastrointestinal: Negative for abdominal pain, diarrhea, nausea and vomiting.   Genitourinary: Negative for dysuria.   Skin: Negative for rash.   Neurological: Positive for dizziness. Negative for facial asymmetry, speech difficulty, weakness, numbness and headaches.        Dizziness increases with cough.    Psychiatric/Behavioral: Negative.        Physical Exam   BP: 121/68  Heart  Rate: 87  Temp: 97.7  F (36.5  C)  Resp: 16  SpO2: 99 %      Physical Exam   Constitutional: She is oriented to person, place, and time. She appears well-developed and well-nourished. No distress.   HENT:   Head: Normocephalic.   Right Ear: External ear normal.   Left Ear: External ear normal.   Mouth/Throat: Oropharynx is clear and moist. No oropharyngeal exudate.   Neck: Normal range of motion. Neck supple.   Cardiovascular: Normal rate, regular rhythm, normal heart sounds and intact distal pulses.    No murmur heard.  Pulmonary/Chest: Effort normal. No respiratory distress. She has no wheezes. She has no rales.   Abdominal: Soft. She exhibits no distension.   Musculoskeletal: Normal range of motion.   Lymphadenopathy:     She has no cervical adenopathy.   Neurological: She is alert and oriented to person, place, and time. She displays normal reflexes. No cranial nerve deficit. She exhibits normal muscle tone. Coordination normal.   Skin: Skin is warm and dry. No rash noted. She is not diaphoretic.   Psychiatric: She has a normal mood and affect. Her behavior is normal.   Nursing note and vitals reviewed.      ED Course     ED Course     Procedures    Results for orders placed or performed during the hospital encounter of 12/16/17   Influenza A/B antigen   Result Value Ref Range    Influenza A/B Agn Specimen Nares     Influenza A Negative NEG^Negative    Influenza B Negative NEG^Negative       Assessments & Plan (with Medical Decision Making)     Symptoms and exam consistent with viral illness. Negative neuro exam. Dizziness worse with cough.     Discussed plan of care. She verbalized understanding. All questions answered.     I have reviewed the nursing notes.    I have reviewed the findings, diagnosis, plan and need for follow up with the patient.  Discharged in stable condition.     New Prescriptions    ALBUTEROL (PROAIR HFA/PROVENTIL HFA/VENTOLIN HFA) 108 (90 BASE) MCG/ACT INHALER    Inhale 2 puffs into the  lungs every 6 hours as needed for shortness of breath / dyspnea or wheezing    BENZONATATE (TESSALON) 100 MG CAPSULE    Take 1 capsule (100 mg) by mouth 3 times daily as needed for cough       Final diagnoses:   Viral URI with cough     Take Tessalon as directed as needed for coughing.   Use Albuterol inhaler as directed as needed for cough, shortness of breathe or wheezing.   Take tylenol and/or ibuprofen for fever. Follow dosing on package.   Increase fluid intake.   Rest.   Follow up with PCP with any increase in symptoms or concerns.   Return to urgent care or emergency department with any increase in symptoms or concerns.     LEONEL Matson  12/16/2017  12:36 PM  URGENT CARE CLINIC       Nu Burger NP  12/16/17 [id number]

## 2017-12-16 NOTE — ED AVS SNAPSHOT
[facility]    [address] [address]    Phone:  [phone]                                       Valorie Awad   MRN: [id number]    Department:  [facility]   Date of Visit:  12/16/2017           After Visit Summary Signature Page     I have received my discharge instructions, and my questions have been answered. I have discussed any challenges I see with this plan with the nurse or doctor.    ..........................................................................................................................................  Patient/Patient Representative Signature      ..........................................................................................................................................  Patient Representative Print Name and Relationship to Patient    ..................................................               ................................................  Date                                            Time    ..........................................................................................................................................  Reviewed by Signature/Title    ...................................................              ..............................................  Date                                                            Time

## 2018-01-19 ENCOUNTER — OFFICE VISIT (OUTPATIENT)
Dept: PSYCHIATRY | Facility: OTHER | Age: 23
End: 2018-01-19
Attending: PSYCHIATRY & NEUROLOGY
Payer: COMMERCIAL

## 2018-01-19 ENCOUNTER — OFFICE VISIT (OUTPATIENT)
Dept: OBGYN | Facility: OTHER | Age: 23
End: 2018-01-19
Attending: NURSE PRACTITIONER
Payer: COMMERCIAL

## 2018-01-19 VITALS
DIASTOLIC BLOOD PRESSURE: 78 MMHG | BODY MASS INDEX: 32.6 KG/M2 | TEMPERATURE: 96.6 F | HEART RATE: 93 BPM | WEIGHT: 202 LBS | SYSTOLIC BLOOD PRESSURE: 118 MMHG

## 2018-01-19 VITALS
SYSTOLIC BLOOD PRESSURE: 118 MMHG | HEART RATE: 93 BPM | DIASTOLIC BLOOD PRESSURE: 78 MMHG | TEMPERATURE: 96.6 F | BODY MASS INDEX: 32.6 KG/M2 | WEIGHT: 202 LBS

## 2018-01-19 DIAGNOSIS — Z30.019 ENCOUNTER FOR INITIAL PRESCRIPTION OF CONTRACEPTIVES, UNSPECIFIED CONTRACEPTIVE: Primary | ICD-10-CM

## 2018-01-19 DIAGNOSIS — F41.9 ANXIETY: ICD-10-CM

## 2018-01-19 DIAGNOSIS — F41.1 GAD (GENERALIZED ANXIETY DISORDER): Primary | ICD-10-CM

## 2018-01-19 LAB — HCG UR QL: NEGATIVE

## 2018-01-19 PROCEDURE — 99214 OFFICE O/P EST MOD 30 MIN: CPT | Performed by: PSYCHIATRY & NEUROLOGY

## 2018-01-19 PROCEDURE — 99212 OFFICE O/P EST SF 10 MIN: CPT | Performed by: NURSE PRACTITIONER

## 2018-01-19 PROCEDURE — G0463 HOSPITAL OUTPT CLINIC VISIT: HCPCS

## 2018-01-19 PROCEDURE — 81025 URINE PREGNANCY TEST: CPT | Mod: ZL | Performed by: NURSE PRACTITIONER

## 2018-01-19 PROCEDURE — G0463 HOSPITAL OUTPT CLINIC VISIT: HCPCS | Performed by: COUNSELOR

## 2018-01-19 RX ORDER — CLONAZEPAM 2 MG/1
2 TABLET ORAL DAILY PRN
Qty: 30 TABLET | Refills: 5 | Status: SHIPPED | OUTPATIENT
Start: 2018-01-19 | End: 2018-07-24

## 2018-01-19 RX ORDER — NORELGESTROMIN AND ETHINYL ESTRADIOL 35; 150 UG/MG; UG/MG
PATCH TRANSDERMAL
Qty: 9 PATCH | Refills: 0 | Status: SHIPPED | OUTPATIENT
Start: 2018-01-19 | End: 2018-03-06 | Stop reason: ALTCHOICE

## 2018-01-19 RX ORDER — VENLAFAXINE 37.5 MG/1
TABLET ORAL
Qty: 60 TABLET | Refills: 3 | Status: SHIPPED | OUTPATIENT
Start: 2018-01-19 | End: 2018-03-15

## 2018-01-19 ASSESSMENT — ANXIETY QUESTIONNAIRES
IF YOU CHECKED OFF ANY PROBLEMS ON THIS QUESTIONNAIRE, HOW DIFFICULT HAVE THESE PROBLEMS MADE IT FOR YOU TO DO YOUR WORK, TAKE CARE OF THINGS AT HOME, OR GET ALONG WITH OTHER PEOPLE: VERY DIFFICULT
1. FEELING NERVOUS, ANXIOUS, OR ON EDGE: MORE THAN HALF THE DAYS
5. BEING SO RESTLESS THAT IT IS HARD TO SIT STILL: NOT AT ALL
GAD7 TOTAL SCORE: 10
6. BECOMING EASILY ANNOYED OR IRRITABLE: MORE THAN HALF THE DAYS
7. FEELING AFRAID AS IF SOMETHING AWFUL MIGHT HAPPEN: SEVERAL DAYS
3. WORRYING TOO MUCH ABOUT DIFFERENT THINGS: MORE THAN HALF THE DAYS
2. NOT BEING ABLE TO STOP OR CONTROL WORRYING: MORE THAN HALF THE DAYS

## 2018-01-19 ASSESSMENT — PATIENT HEALTH QUESTIONNAIRE - PHQ9
5. POOR APPETITE OR OVEREATING: SEVERAL DAYS
SUM OF ALL RESPONSES TO PHQ QUESTIONS 1-9: 8

## 2018-01-19 ASSESSMENT — PAIN SCALES - GENERAL
PAINLEVEL: NO PAIN (0)
PAINLEVEL: NO PAIN (0)

## 2018-01-19 NOTE — MR AVS SNAPSHOT
"              After Visit Summary   1/19/2018    Valorie Awad    MRN: [id number]           Patient Information     Date Of Birth          1995        Visit Information        Provider Department      1/19/2018 10:15 AM Sheree Osborn NP [facility]        Today's Diagnoses     Encounter for initial prescription of contraceptives, unspecified contraceptive    -  1      Care Instructions    Return in 90 days          Follow-ups after your visit        Your next 10 appointments already scheduled     Feb 28, 2018 11:40 AM CST   (Arrive by 11:25 AM)   Return Visit with Jyoti Leahy MD   [facility] ([facility] )    [address] [place] [id number] [phone]              Who to contact     If you have questions or need follow up information about today's clinic visit or your schedule please contact [facility] directly at [phone].  Normal or non-critical lab and imaging results will be communicated to you by MyChart, letter or phone within 4 business days after the clinic has received the results. If you do not hear from us within 7 days, please contact the clinic through MyChart or phone. If you have a critical or abnormal lab result, we will notify you by phone as soon as possible.  Submit refill requests through [employer] or call your pharmacy and they will forward the refill request to us. Please allow 3 business days for your refill to be completed.          Additional Information About Your Visit        MyChart Information     [employer] lets you send messages to your doctor, view your test results, renew your prescriptions, schedule appointments and more. To sign up, go to [email or web address] . Click on \"Log in\" on the left side of the screen, which will take you to the Welcome page. Then click on \"Sign up Now\" on the right side of the page.     You will be asked to enter the access code listed below, as well as some personal " information. Please follow the directions to create your username and password.     Your access code is: [email or web address]  Expires: 2018 10:15 AM     Your access code will  in 90 days. If you need help or a new code, please call your [facility] clinic or [phone].        Care EveryWhere ID     This is your Care EveryWhere ID. This could be used by other organizations to access your [facility] medical records  [id number]        Your Vitals Were     Pulse Temperature BMI (Body Mass Index)             93 96.6  F (35.9  C) (Tympanic) 32.6 kg/m2          Blood Pressure from Last 3 Encounters:   18 118/78   18 118/78   17 121/68    Weight from Last 3 Encounters:   18 202 lb (91.6 kg)   18 202 lb (91.6 kg)   17 200 lb (90.7 kg)              We Performed the Following     HCG qualitative urine - CSC and Range          Today's Medication Changes          These changes are accurate as of 18 11:59 PM.  If you have any questions, ask your nurse or doctor.               Start taking these medicines.        Dose/Directions    norelgestromin-ethinyl estradiol 150-35 MCG/24HR patch   Commonly known as:  ORTHO EVRA   Used for:  Encounter for initial prescription of contraceptives, unspecified contraceptive   Started by:  Sheree Osborn, NP        Remove old patch and apply new patch onto the skin once a week for 3 weeks (21 days). Do not wear patch week 4 (days 22-28), then repeat.   Quantity:  9 patch   Refills:  0       venlafaxine 37.5 MG tablet   Commonly known as:  EFFEXOR   Used for:  NITZA (generalized anxiety disorder)   Started by:  Jyoti Leahy MD        Take 1 tablet daily in am for 1 week then increase to 2 tablets daily   Quantity:  60 tablet   Refills:  3            Where to get your medicines      These medications were sent to [id number] - [place] - [address] AT [facility] [address], [place] [address]     Phone:   [phone]     norelgestromin-ethinyl estradiol 150-35 MCG/24HR patch    venlafaxine 37.5 MG tablet         Some of these will need a paper prescription and others can be bought over the counter.  Ask your nurse if you have questions.     Bring a paper prescription for each of these medications     clonazePAM 2 MG tablet                Primary Care Provider Office Phone # Fax #    Bisi Muro [phone]       [facility] [place] [address] [facility] [place] [address]        Equal Access to Services     [facility]: Hadii aad ku hadasho Soomaali, waaxda luqadaha, qaybta kaalmada adeegyada, waxay wilfridin hayhenrikn [place] larkin . So [facility] [phone].    ATENCIÓN: Si habla español, tiene a yao disposición servicios gratuitos de asistencia lingüística. [facility] [phone].    We comply with applicable federal civil rights laws and [place] laws. We do not discriminate on the basis of race, color, national origin, age, disability, sex, sexual orientation, or gender identity.            Thank you!     Thank you for choosing [facility]  for your care. Our goal is always to provide you with excellent care. Hearing back from our patients is one way we can continue to improve our services. Please take a few minutes to complete the written survey that you may receive in the mail after your visit with us. Thank you!             Your Updated Medication List - Protect others around you: Learn how to safely use, store and throw away your medicines at www.disposemymeds.org.          This list is accurate as of 1/19/18 11:59 PM.  Always use your most recent med list.                   Brand Name Dispense Instructions for use Diagnosis    albuterol 108 (90 BASE) MCG/ACT Inhaler    PROAIR HFA/PROVENTIL HFA/VENTOLIN HFA    1 Inhaler    Inhale 2 puffs into the lungs every 6 hours as needed for shortness of breath / dyspnea or wheezing        clonazePAM 2 MG tablet    klonoPIN    30 tablet     Take 1 tablet (2 mg) by mouth daily as needed for anxiety    Anxiety       medroxyPROGESTERone 150 MG/ML injection    DEPO-PROVERA    1 mL    Inject 1 mL (150 mg) into the muscle every 3 months    Encounter for surveillance of injectable contraceptive       norelgestromin-ethinyl estradiol 150-35 MCG/24HR patch    ORTHO EVRA    9 patch    Remove old patch and apply new patch onto the skin once a week for 3 weeks (21 days). Do not wear patch week 4 (days 22-28), then repeat.    Encounter for initial prescription of contraceptives, unspecified contraceptive       SUMAtriptan 100 MG tablet    IMITREX    9 tablet    Take 1 tablet (100 mg) by mouth at onset of headache for migraine May repeat in 2 hours if needed: max 2/day; average number of headaches monthly    Migraine without aura and without status migrainosus, not intractable       TYLENOL PO      Take 1,000 mg by mouth every 6 hours as needed for mild pain or fever        venlafaxine 37.5 MG tablet    EFFEXOR    60 tablet    Take 1 tablet daily in am for 1 week then increase to 2 tablets daily    NITZA (generalized anxiety disorder)

## 2018-01-19 NOTE — NURSING NOTE
"Chief Complaint   Patient presents with     Contraception     Discussion       Initial /78  Pulse 93  Temp 96.6  F (35.9  C) (Tympanic)  Wt 202 lb (91.6 kg)  BMI 32.6 kg/m2 Estimated body mass index is 32.6 kg/(m^2) as calculated from the following:    Height as of 7/11/17: 5' 6\" (1.676 m).    Weight as of this encounter: 202 lb (91.6 kg).  Medication Reconciliation: complete     Mary Jarvis      "

## 2018-01-19 NOTE — PROGRESS NOTES
PSYCHIATRY CLINIC PROGRESS NOTE   20 minute medication management, more than 50% of time spent counseling patient on medications, medication side effects, symptom history and management   SUBJECTIVE / INTERIM HISTORY                                                                       The pt was last seen in clinic 2/13/15 at which time increased Zoloft 100 to 150 and switched from Ativan to Klonopin 0.5 mg take 1-2 tabs prn.  Children-  Traevion is 3 yo.  Last visit:   -- taking Klonopin as prn. 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 Feels mood is stable - doesn't feel depressed at this time.  MEDICAL ROS-  Hip / sciatic nerve pain, Migraines have been worse, no GI issues  MEDICAL / SURGICAL HISTORY                pregnant [if applicable]--no   Medical Team:     PMD- Dr. Jo Muro    Therapist- [facility]   Patient Active Problem List   Diagnosis     Anxiety state     Migraine     Spasm of muscle     ACP (advance care planning)     ALLERGY   Bactrim [sulfamethoxazole w-trimethoprim]; Morphine hcl; Clindamycin; Nitrofurantoin; Nitrofurantoin monohydrate macrocrystals; and Loracarbef  MEDICATIONS                                                                                             Current Outpatient Prescriptions   Medication Sig     Acetaminophen (TYLENOL PO) Take 1,000 mg by mouth every 6 hours as needed for mild pain or fever     albuterol (PROAIR HFA/PROVENTIL HFA/VENTOLIN HFA) 108 (90 BASE) MCG/ACT Inhaler Inhale 2 puffs into the lungs every 6 hours as needed for shortness of breath / dyspnea or wheezing     medroxyPROGESTERone (DEPO-PROVERA) 150 MG/ML  injection Inject 1 mL (150 mg) into the muscle every 3 months     clonazePAM (KLONOPIN) 2 MG tablet Take 1 tablet (2 mg) by mouth daily as needed for anxiety     SUMAtriptan (IMITREX) 100 MG tablet Take 1 tablet (100 mg) by mouth at onset of headache for migraine May repeat in 2 hours if needed: max 2/day; average number of headaches monthly     No current facility-administered medications for this visit.        VITALS   /78 (BP Location: Right arm, Patient Position: Sitting, Cuff Size: Adult Regular)  Pulse 93  Temp 96.6  F (35.9  C) (Tympanic)  Wt 202 lb (91.6 kg)  BMI 32.6 kg/m2     PHQ9                       PHQ-9 SCORE 3/22/2017 7/11/2017 1/19/2018   Total Score - - -   Total Score 3 7 8       MENTAL STATUS EXAM                                                                                        Alert. Oriented to person, place, and date / time. Well groomed, calm, cooperative with good eye contact. No problems with speech or psychomotor behavior. Mood was described as anxious and affect was congruent to speech content and full range. Thought process, including associations, was unremarkable and thought content was devoid of suicidal and homicidal ideation and psychotic thought. No hallucinations. Insight was good. Judgment was intact and adequate for safety. Fund of knowledge was intact. Pt demonstrates no obvious problems with attention, concentration, language, recent or remote memory although these were not formally tested.     ASSESSMENT                                                                                                      HISTORICAL:  Initial psych note 2/13/15       Notes: Buspar ineffective. Zoloft:   CURRENT:  This patient provides a history which supports the diagnoses of panic disorder without agoraphobia  and MDD, recurrent, mild to moderate and NITZA. Anxiety has been bad, excessive worrying even when everything is fine.  Only meds is KLonopin prn : she feels needs something  daily. We are going to have her try effexor and we reviewed potential SEs.    TREATMENT RISK STATEMENT:  The risks, benefits, alternatives and potential adverse effects have been explained and are understood by the pt.  The pt agrees to the treatment plan with the ability to do so.  Discussion of specific concerns included- potential SEs meds.  The pt knows to call the clinic for any problems or access emergency care if needed.   There are no medical considerations relevant to treatment, as noted above.  Substance use is not a problem as noted above.         DIAGNOSES               Panic disorder without agoraphobia  Major depressive disorder, recurrent, mild to mod  NITZA      PLAN                                                                                                                           1) MEDICATIONS: we will continue Klonopin as prn. Going to start venlafaxine 37.5 mg daily for one week then increase to 75 mg daily       2) THERAPY: No Change. She was working with a therapist at [employer] [facility]    3) LABS: None    4) PT MONITOR [call for probs]: worsening sx, side effects, if develops SI    5) REFERRALS [CD tx, medical, tests other]: None    6)  RTC: ~1 month

## 2018-01-23 ASSESSMENT — ANXIETY QUESTIONNAIRES: GAD7 TOTAL SCORE: 10

## 2018-01-24 NOTE — PROGRESS NOTES
[facility]                HPI   Valorie presents because she would like to begin using Depo Provera for contraception.  Pros and cons of this method were discussed including alternative forms of contraception.  She wishes to proceed.              Medications:     Current Outpatient Prescriptions Ordered in Epic   Medication     norelgestromin-ethinyl estradiol (ORTHO EVRA) 150-35 MCG/24HR patch     Acetaminophen (TYLENOL PO)     albuterol (PROAIR HFA/PROVENTIL HFA/VENTOLIN HFA) 108 (90 BASE) MCG/ACT Inhaler     medroxyPROGESTERone (DEPO-PROVERA) 150 MG/ML injection     SUMAtriptan (IMITREX) 100 MG tablet     venlafaxine (EFFEXOR) 37.5 MG tablet     clonazePAM (KLONOPIN) 2 MG tablet     No current Epic-ordered facility-administered medications on file.                 Allergies:   Bactrim [sulfamethoxazole w-trimethoprim]; Morphine hcl; Clindamycin; Nitrofurantoin; Nitrofurantoin monohydrate macrocrystals; and Loracarbef         Review of Systems:   The 5 point Review of Systems is negative other than noted in the HPI                     Physical Exam:   Blood pressure 118/78, pulse 93, temperature 96.6  F (35.9  C), temperature source Tympanic, weight 202 lb (91.6 kg).  Constitutional:   awake, alert, cooperative, no apparent distress, and appears stated age              Data:     Results for orders placed or performed in visit on 01/19/18   HCG qualitative urine - CSC and Range   Result Value Ref Range    HCG Qual Urine Negative NEG^Negative               Assessment and Plan:   Contraceptive counseling - Depo Provera 150 mg IM every 90 days.       FÁTIMA Pagan  1/24/2018  12:38 PM

## 2018-02-07 ENCOUNTER — TELEPHONE (OUTPATIENT)
Dept: PSYCHIATRY | Facility: OTHER | Age: 23
End: 2018-02-07

## 2018-02-07 NOTE — TELEPHONE ENCOUNTER
Patient c/o possible side effects to Effexor.  C/o symptoms of throwing up, dizziness and redness to chest.  Has taken it for 2 days and had same symptoms.  Has now stopped taking this.  Wondering what other options or wait to discuss at next f/u on 2-28-18,  Thank you, please advise.

## 2018-02-13 ENCOUNTER — TELEPHONE (OUTPATIENT)
Dept: PSYCHIATRY | Facility: OTHER | Age: 23
End: 2018-02-13

## 2018-02-13 DIAGNOSIS — F33.1 MAJOR DEPRESSIVE DISORDER, RECURRENT EPISODE, MODERATE (H): Primary | ICD-10-CM

## 2018-02-13 RX ORDER — ESCITALOPRAM OXALATE 10 MG/1
TABLET ORAL
Qty: 30 TABLET | Refills: 3 | Status: SHIPPED | OUTPATIENT
Start: 2018-02-13 | End: 2018-10-05

## 2018-02-13 NOTE — TELEPHONE ENCOUNTER
I gave Valorie call. Given her mom does well with Lexapro I think worth having Valorie try. We reviewed potnetial SEs and will start out bit lower given she tried Effexor recnetly SEs... So 5 mg daily for week then up to 10 mg daily.

## 2018-02-13 NOTE — TELEPHONE ENCOUNTER
Patient contacted and notified will discuss side effects and other medications at next f/u on 2-28-18.

## 2018-03-05 ENCOUNTER — TELEPHONE (OUTPATIENT)
Dept: OBGYN | Facility: OTHER | Age: 23
End: 2018-03-05

## 2018-03-05 DIAGNOSIS — Z30.011 ENCOUNTER FOR INITIAL PRESCRIPTION OF CONTRACEPTIVE PILLS: Primary | ICD-10-CM

## 2018-03-05 NOTE — TELEPHONE ENCOUNTER
Patient called stating that she was put on the BC patch and it does not stay on her at all. She states that she has lost it numerous times. She is wanting to switch to the BCP. She would prefer the Seasonique if her insurance will cover it or a pill she could take back to back.

## 2018-03-06 RX ORDER — DESOGESTREL AND ETHINYL ESTRADIOL 0.15-0.03
1 KIT ORAL DAILY
Qty: 84 TABLET | Refills: 6 | Status: SHIPPED | OUTPATIENT
Start: 2018-03-06 | End: 2018-07-19

## 2018-03-15 ENCOUNTER — OFFICE VISIT (OUTPATIENT)
Dept: FAMILY MEDICINE | Facility: OTHER | Age: 23
End: 2018-03-15
Attending: FAMILY MEDICINE
Payer: COMMERCIAL

## 2018-03-15 VITALS
SYSTOLIC BLOOD PRESSURE: 110 MMHG | HEART RATE: 107 BPM | TEMPERATURE: 99.1 F | DIASTOLIC BLOOD PRESSURE: 58 MMHG | OXYGEN SATURATION: 96 % | BODY MASS INDEX: 33.09 KG/M2 | RESPIRATION RATE: 16 BRPM | WEIGHT: 205 LBS

## 2018-03-15 DIAGNOSIS — G43.719 INTRACTABLE CHRONIC MIGRAINE WITHOUT AURA AND WITHOUT STATUS MIGRAINOSUS: Primary | ICD-10-CM

## 2018-03-15 DIAGNOSIS — M54.41 ACUTE RIGHT-SIDED LOW BACK PAIN WITH RIGHT-SIDED SCIATICA: ICD-10-CM

## 2018-03-15 PROCEDURE — 99213 OFFICE O/P EST LOW 20 MIN: CPT | Performed by: FAMILY MEDICINE

## 2018-03-15 PROCEDURE — G0463 HOSPITAL OUTPT CLINIC VISIT: HCPCS

## 2018-03-15 RX ORDER — TOPIRAMATE 25 MG/1
TABLET, FILM COATED ORAL
Qty: 60 TABLET | Refills: 1 | Status: SHIPPED | OUTPATIENT
Start: 2018-03-15 | End: 2018-07-03

## 2018-03-15 RX ORDER — SUMATRIPTAN 100 MG/1
100 TABLET, FILM COATED ORAL
Qty: 9 TABLET | Refills: 3 | Status: SHIPPED | OUTPATIENT
Start: 2018-03-15 | End: 2019-03-19

## 2018-03-15 RX ORDER — NABUMETONE 500 MG/1
500-1000 TABLET, FILM COATED ORAL 2 TIMES DAILY PRN
Qty: 60 TABLET | Refills: 1 | Status: SHIPPED | OUTPATIENT
Start: 2018-03-15 | End: 2019-01-03

## 2018-03-15 ASSESSMENT — ANXIETY QUESTIONNAIRES
4. TROUBLE RELAXING: SEVERAL DAYS
3. WORRYING TOO MUCH ABOUT DIFFERENT THINGS: MORE THAN HALF THE DAYS
IF YOU CHECKED OFF ANY PROBLEMS ON THIS QUESTIONNAIRE, HOW DIFFICULT HAVE THESE PROBLEMS MADE IT FOR YOU TO DO YOUR WORK, TAKE CARE OF THINGS AT HOME, OR GET ALONG WITH OTHER PEOPLE: SOMEWHAT DIFFICULT
7. FEELING AFRAID AS IF SOMETHING AWFUL MIGHT HAPPEN: NOT AT ALL
1. FEELING NERVOUS, ANXIOUS, OR ON EDGE: MORE THAN HALF THE DAYS
6. BECOMING EASILY ANNOYED OR IRRITABLE: NEARLY EVERY DAY
5. BEING SO RESTLESS THAT IT IS HARD TO SIT STILL: SEVERAL DAYS
GAD7 TOTAL SCORE: 11
2. NOT BEING ABLE TO STOP OR CONTROL WORRYING: MORE THAN HALF THE DAYS

## 2018-03-15 ASSESSMENT — PAIN SCALES - GENERAL: PAINLEVEL: NO PAIN (0)

## 2018-03-15 NOTE — MR AVS SNAPSHOT
"              After Visit Summary   3/15/2018    Valorie Awad    MRN: [id number]           Patient Information     Date Of Birth          1995        Visit Information        Provider Department      3/15/2018 4:00 PM Bisi Muro MD [facility]        Today's Diagnoses     Intractable chronic migraine without aura and without status migrainosus    -  1    Migraine without aura and without status migrainosus, not intractable        Acute right-sided low back pain with right-sided sciatica           Follow-ups after your visit        Additional Services     PHYSICAL THERAPY REFERRAL       *This therapy referral will be filtered to a centralized scheduling office at [facility] and the patient will receive a call to schedule an appointment at a [facility] location most convenient for them. *     [facility] provides Physical Therapy evaluation and treatment and many specialty services across the [facility] system.  If requesting a specialty program, please choose from the list below.    If you have not heard from the scheduling office within 2 business days, please call [phone] for all locations, with the exception of [place], please call [phone] and [facility], please call [phone]  Treatment: Evaluation & Treatment  Special Instructions/Modalities: none  Special Programs:     Please be aware that coverage of these services is subject to the terms and limitations of your health insurance plan.  Call member services at your health plan with any benefit or coverage questions.      **Note to Provider:  If you are referring outside of [facility] for the therapy appointment, please list the name of the location in the \"special instructions\" above, print the referral and give to the patient to schedule the appointment.                  Follow-up notes from your care team     Return in about 4 weeks (around 4/12/2018).      Your next 10 appointments " "already scheduled     2018  8:40 AM CDT   (Arrive by 8:25 AM)   Return Visit with Jyoti Leahy MD   [facility] ([facility] [place] )    [address]  [place] [address] [phone]            2018  3:30 PM CDT   (Arrive by 3:15 PM)   SHORT with Bisi Muro MD   [facility] ([facility] [place] )    [address]  [place] [address]   [phone]              Who to contact     If you have questions or need follow up information about today's clinic visit or your schedule please contact [facility] directly at [phone].  Normal or non-critical lab and imaging results will be communicated to you by MyChart, letter or phone within 4 business days after the clinic has received the results. If you do not hear from us within 7 days, please contact the clinic through MyChart or phone. If you have a critical or abnormal lab result, we will notify you by phone as soon as possible.  Submit refill requests through [employer] or call your pharmacy and they will forward the refill request to us. Please allow 3 business days for your refill to be completed.          Additional Information About Your Visit        MyChart Information     [employer] lets you send messages to your doctor, view your test results, renew your prescriptions, schedule appointments and more. To sign up, go to [email or web address] . Click on \"Log in\" on the left side of the screen, which will take you to the Welcome page. Then click on \"Sign up Now\" on the right side of the page.     You will be asked to enter the access code listed below, as well as some personal information. Please follow the directions to create your username and password.     Your access code is: [email or web address]  Expires: 2018 11:15 AM     Your access code will  in 90 days. If you need help or a new code, please call your [facility] or [phone].        Care " EveryWhere ID     This is your Care EveryWhere ID. This could be used by other organizations to access your [place] medical records  [id number]        Your Vitals Were     Pulse Temperature Respirations Pulse Oximetry BMI (Body Mass Index)       107 99.1  F (37.3  C) (Tympanic) 16 96% 33.09 kg/m2        Blood Pressure from Last 3 Encounters:   03/15/18 110/58   01/19/18 118/78   01/19/18 118/78    Weight from Last 3 Encounters:   03/15/18 205 lb (93 kg)   01/19/18 202 lb (91.6 kg)   01/19/18 202 lb (91.6 kg)              We Performed the Following     PHYSICAL THERAPY REFERRAL          Today's Medication Changes          These changes are accurate as of 3/15/18  4:34 PM.  If you have any questions, ask your nurse or doctor.               Start taking these medicines.        Dose/Directions    nabumetone 500 MG tablet   Commonly known as:  RELAFEN   Used for:  Acute right-sided low back pain with right-sided sciatica   Started by:  Bisi Muro MD        Dose:  500-1000 mg   Take 1-2 tablets (500-1,000 mg) by mouth 2 times daily as needed for moderate pain   Quantity:  60 tablet   Refills:  1       topiramate 25 MG tablet   Commonly known as:  TOPAMAX   Used for:  Intractable chronic migraine without aura and without status migrainosus   Started by:  Bisi Muro MD        Take one 25 mg tablet in the evening for one week then increase to one 25 mg tablet twice daily   Quantity:  60 tablet   Refills:  1            Where to get your medicines      These medications [facility] [address] [place] [address] [place]  [address] AT [facility] [address] [place] [address]     Phone:  [phone]     nabumetone 500 MG tablet    topiramate 25 MG tablet         Some of these will need a paper prescription and others can be bought over the counter.  Ask your nurse if you have questions.     Bring a paper prescription for each of these medications     SUMAtriptan 100 MG tablet                 Primary Care Provider Office Phone # Fax #    Bisi Muro [phone] [address] [facility] [address]        Equal Access to Services     JESUS ALBERTORAÚL LUANNE : [facility] dilip espinoza shonda Craig, wamorganda luqadaha, qaybta kagypsyda albino, rafita monroe ronaradha berry ladougtaya barriga. So [facility] [phone].    ATENCIÓN: Si habla español, tiene a yao disposición servicios gratuitos de asistencia lingüística. Llame al [phone].    We comply with applicable federal civil rights laws and [place] laws. We do not discriminate on the basis of race, color, national origin, age, disability, sex, sexual orientation, or gender identity.            Thank you!     Thank you for choosing [facility]  for your care. Our goal is always to provide you with excellent care. Hearing back from our patients is one way we can continue to improve our services. Please take a few minutes to complete the written survey that you may receive in the mail after your visit with us. Thank you!             Your Updated Medication List - Protect others around you: Learn how to safely use, store and throw away your medicines at www.disposemymeds.org.          This list is accurate as of 3/15/18  4:34 PM.  Always use your most recent med list.                   Brand Name Dispense Instructions for use Diagnosis    albuterol 108 (90 BASE) MCG/ACT Inhaler    PROAIR HFA/PROVENTIL HFA/VENTOLIN HFA    1 Inhaler    Inhale 2 puffs into the lungs every 6 hours as needed for shortness of breath / dyspnea or wheezing        clonazePAM 2 MG tablet    klonoPIN    30 tablet    Take 1 tablet (2 mg) by mouth daily as needed for anxiety    Anxiety       desogestrel-ethinyl estradiol 0.15-30 MG-MCG per tablet    APRI    84 tablet    Take 1 tablet by mouth daily    Encounter for initial prescription of contraceptive pills       escitalopram 10 MG tablet    LEXAPRO    30 tablet    Take 1/2 tablet daily for 1 week then increase to  1 tablet daily    Major depressive disorder, recurrent episode, moderate (H)       nabumetone 500 MG tablet    RELAFEN    60 tablet    Take 1-2 tablets (500-1,000 mg) by mouth 2 times daily as needed for moderate pain    Acute right-sided low back pain with right-sided sciatica       SUMAtriptan 100 MG tablet    IMITREX    9 tablet    Take 1 tablet (100 mg) by mouth at onset of headache for migraine May repeat in 2 hours if needed: max 2/day; average number of headaches monthly    Migraine without aura and without status migrainosus, not intractable       topiramate 25 MG tablet    TOPAMAX    60 tablet    Take one 25 mg tablet in the evening for one week then increase to one 25 mg tablet twice daily    Intractable chronic migraine without aura and without status migrainosus       TYLENOL PO      Take 1,000 mg by mouth every 6 hours as needed for mild pain or fever

## 2018-03-15 NOTE — PROGRESS NOTES
SUBJECTIVE:   Valorie Awad is a 22 year old female who presents to clinic today for the following health issues:    Migraine Follow-Up    Headaches symptoms:  Worsened     Frequency: 2-3/week     Duration of headaches: 2-3 hours    Able to do normal daily activities/work with migraines: No - usually have to try to sleep to help them     Rescue/Relief medication:ibuprofen (Advil, Motrin) and Tylenol              Effectiveness: no relief    Preventative medication: None    Neurologic complications: No new stroke-like symptoms, loss of vision or speech, numbness or weakness    In the past 4 weeks, how often have you gone to Urgent Care or the emergency room because of your headaches?  0      Amount of exercise or physical activity: 4-5 days/week for an average of 45-60 minutes    Problems taking medications regularly: No    Medication side effects: none    Diet: regular (no restrictions)    She had been on Imitrex in the past which worked well.     Back Pain       Duration: 3 months        Specific cause: none    Description:   Location of pain: low back right  Character of pain: dull ache  Pain radiation:radiates into the right leg, numbness not pain  New numbness or weakness in legs, not attributed to pain:  no     Intensity: Currently 2/10, mild    History:   Pain interferes with job: No,   History of back problems: no prior back problems  Any previous MRI or X-rays: None  Sees a specialist for back pain:  No  Therapies tried without relief: acetaminophen (Tylenol) and NSAIDs    Alleviating factors:   Improved by: acetaminophen (Tylenol) and NSAIDs      Precipitating factors:  Worsened by: Sitting    Functional and Psychosocial Screen (Ree STarT Back):                Accompanying Signs & Symptoms:  Risk of Fracture:  None  Risk of Cauda Equina:  None  Risk of Infection:  None  Risk of Cancer:  None  Risk of Ankylosing Spondylitis:  Onset at age <35, male, AND morning back stiffness. no                   Problem  list and histories reviewed & adjusted, as indicated.  Additional history: as documented    Patient Active Problem List   Diagnosis     Anxiety state     Migraine     Spasm of muscle     ACP (advance care planning)     Past Surgical History:   Procedure Laterality Date     Comminuted fracture elbow Left 10/25/2013    lauren placed     CYSTOSCOPY      bladder distention     TONSILLECTOMY         Social History   Substance Use Topics     Smoking status: Never Smoker     Smokeless tobacco: Never Used     Alcohol use 0.0 oz/week     0 Standard drinks or equivalent per week      Comment: social     Family History   Problem Relation Age of Onset     Other - See Comments Father      alcoholism     Depression Father      MENTAL ILLNESS Father      Depression Mother      Irritable Bowel Syndrome Mother      MENTAL ILLNESS Mother      Other - See Comments Mother      migraine headache     Other - See Comments Maternal Grandmother      blood disease     Lipids Maternal Grandmother      hyperlipidemia     Thyroid Disease Maternal Grandmother      DIABETES No family hx of      Asthma No family hx of          Current Outpatient Prescriptions   Medication Sig Dispense Refill     topiramate (TOPAMAX) 25 MG tablet Take one 25 mg tablet in the evening for one week then increase to one 25 mg tablet twice daily 60 tablet 1     SUMAtriptan (IMITREX) 100 MG tablet Take 1 tablet (100 mg) by mouth at onset of headache for migraine May repeat in 2 hours if needed: max 2/day; average number of headaches monthly 9 tablet 3     nabumetone (RELAFEN) 500 MG tablet Take 1-2 tablets (500-1,000 mg) by mouth 2 times daily as needed for moderate pain 60 tablet 1     desogestrel-ethinyl estradiol (APRI) 0.15-30 MG-MCG per tablet Take 1 tablet by mouth daily 84 tablet 6     escitalopram (LEXAPRO) 10 MG tablet Take 1/2 tablet daily for 1 week then increase to 1 tablet daily 30 tablet 3     clonazePAM (KLONOPIN) 2 MG tablet Take 1 tablet (2 mg) by mouth  daily as needed for anxiety 30 tablet 5     Acetaminophen (TYLENOL PO) Take 1,000 mg by mouth every 6 hours as needed for mild pain or fever       albuterol (PROAIR HFA/PROVENTIL HFA/VENTOLIN HFA) 108 (90 BASE) MCG/ACT Inhaler Inhale 2 puffs into the lungs every 6 hours as needed for shortness of breath / dyspnea or wheezing 1 Inhaler 0     Allergies   Allergen Reactions     Bactrim [Sulfamethoxazole W-Trimethoprim] Rash     Morphine Hcl Nausea and Vomiting     Clindamycin Other (See Comments)     Thrush       Nitrofurantoin      Macrobid      Nitrofurantoin Monohydrate Macrocrystals      Macrobid      Loracarbef Rash     Lorabid       Reviewed and updated as needed this visit by clinical staff  Tobacco  Allergies  Meds  Problems       Reviewed and updated as needed this visit by Provider         ROS:  CONSTITUTIONAL: NEGATIVE for fever, chills, change in weight  ENT/MOUTH: NEGATIVE for ear, mouth and throat problems  RESP: NEGATIVE for significant cough or SOB  CV: NEGATIVE for chest pain, palpitations or peripheral edema  : no change in bowel or bladder habits  NEURO: NEGATIVE for weakness, dizziness or paresthesias  PSYCHIATRIC: NEGATIVE for changes in mood or affect    OBJECTIVE:                                                    /58 (BP Location: Left arm, Patient Position: Chair, Cuff Size: Adult Large)  Pulse 107  Temp 99.1  F (37.3  C) (Tympanic)  Resp 16  Wt 205 lb (93 kg)  SpO2 96%  BMI 33.09 kg/m2  Body mass index is 33.09 kg/(m^2).   GENERAL: healthy, alert, well nourished, well hydrated, no distress  EYES: Eyes grossly normal to inspection, extraocular movements - intact, and PERRL  HENT: ear canals- normal; TMs- normal; Nose- normal; Mouth- no ulcers, no lesions  NECK: no tenderness, no adenopathy, no asymmetry, no masses, no stiffness; thyroid- normal to palpation  MS: extremities- no gross deformities noted, no edema  SKIN: no suspicious lesions, no rashes  NEURO: strength and tone-  normal, sensory exam- grossly normal, mentation- intact, speech- normal, reflexes- symmetric  PSYCH: Alert and oriented times 3; speech- coherent , normal rate and volume; able to articulate logical thoughts, able to abstract reason, no tangential thoughts, no hallucinations or delusions, affect- normal         ASSESSMENT/PLAN:                                                    1. Intractable chronic migraine without aura and without status migrainosus  Follow up in 2 months to recheck, sooner for concerns  - topiramate (TOPAMAX) 25 MG tablet; Take one 25 mg tablet in the evening for one week then increase to one 25 mg tablet twice daily  Dispense: 60 tablet; Refill: 1  - SUMAtriptan (IMITREX) 100 MG tablet; Take 1 tablet (100 mg) by mouth at onset of headache for migraine May repeat in 2 hours if needed: max 2/day; average number of headaches monthly  Dispense: 9 tablet; Refill: 3    2. Acute right-sided low back pain with right-sided sciatica  Follow up if not improving or worsening  - nabumetone (RELAFEN) 500 MG tablet; Take 1-2 tablets (500-1,000 mg) by mouth 2 times daily as needed for moderate pain  Dispense: 60 tablet; Refill: 1  - PHYSICAL THERAPY REFERRAL          Bisi Muro MD  [facility]

## 2018-03-16 ASSESSMENT — PATIENT HEALTH QUESTIONNAIRE - PHQ9: SUM OF ALL RESPONSES TO PHQ QUESTIONS 1-9: 7

## 2018-03-16 ASSESSMENT — ANXIETY QUESTIONNAIRES: GAD7 TOTAL SCORE: 11

## 2018-05-30 ENCOUNTER — HOSPITAL ENCOUNTER (EMERGENCY)
Facility: HOSPITAL | Age: 23
Discharge: HOME | End: 2018-05-30
Attending: PHYSICIAN ASSISTANT | Admitting: PHYSICIAN ASSISTANT
Payer: COMMERCIAL

## 2018-05-30 VITALS
SYSTOLIC BLOOD PRESSURE: 120 MMHG | DIASTOLIC BLOOD PRESSURE: 81 MMHG | OXYGEN SATURATION: 98 % | HEART RATE: 68 BPM | RESPIRATION RATE: 16 BRPM | TEMPERATURE: 98 F

## 2018-05-30 DIAGNOSIS — R82.81 PYURIA: ICD-10-CM

## 2018-05-30 DIAGNOSIS — G43.909 MIGRAINE WITHOUT STATUS MIGRAINOSUS, NOT INTRACTABLE, UNSPECIFIED MIGRAINE TYPE: ICD-10-CM

## 2018-05-30 DIAGNOSIS — R55 VASOVAGAL SYNCOPE: ICD-10-CM

## 2018-05-30 LAB
ALBUMIN UR-MCNC: NEGATIVE MG/DL
APPEARANCE UR: CLEAR
BACTERIA #/AREA URNS HPF: ABNORMAL /HPF
BILIRUB UR QL STRIP: NEGATIVE
COLOR UR AUTO: ABNORMAL
GLUCOSE UR STRIP-MCNC: NEGATIVE MG/DL
HCG UR QL: NEGATIVE
HGB UR QL STRIP: ABNORMAL
KETONES UR STRIP-MCNC: NEGATIVE MG/DL
LEUKOCYTE ESTERASE UR QL STRIP: ABNORMAL
MUCOUS THREADS #/AREA URNS LPF: PRESENT /LPF
NITRATE UR QL: NEGATIVE
PH UR STRIP: 6.5 PH (ref 4.7–8)
RBC #/AREA URNS AUTO: 24 /HPF (ref 0–2)
SOURCE: ABNORMAL
SP GR UR STRIP: 1.01 (ref 1–1.03)
UROBILINOGEN UR STRIP-MCNC: NORMAL MG/DL (ref 0–2)
WBC #/AREA URNS AUTO: 28 /HPF (ref 0–5)

## 2018-05-30 PROCEDURE — 87086 URINE CULTURE/COLONY COUNT: CPT | Performed by: PHYSICIAN ASSISTANT

## 2018-05-30 PROCEDURE — 96374 THER/PROPH/DIAG INJ IV PUSH: CPT

## 2018-05-30 PROCEDURE — 99284 EMERGENCY DEPT VISIT MOD MDM: CPT | Performed by: PHYSICIAN ASSISTANT

## 2018-05-30 PROCEDURE — 93010 ELECTROCARDIOGRAM REPORT: CPT | Performed by: INTERNAL MEDICINE

## 2018-05-30 PROCEDURE — 96361 HYDRATE IV INFUSION ADD-ON: CPT

## 2018-05-30 PROCEDURE — 93005 ELECTROCARDIOGRAM TRACING: CPT

## 2018-05-30 PROCEDURE — 25000128 H RX IP 250 OP 636: Performed by: PHYSICIAN ASSISTANT

## 2018-05-30 PROCEDURE — 99284 EMERGENCY DEPT VISIT MOD MDM: CPT | Mod: 25

## 2018-05-30 PROCEDURE — 81025 URINE PREGNANCY TEST: CPT | Performed by: PHYSICIAN ASSISTANT

## 2018-05-30 PROCEDURE — 81001 URINALYSIS AUTO W/SCOPE: CPT | Performed by: PHYSICIAN ASSISTANT

## 2018-05-30 PROCEDURE — 96375 TX/PRO/DX INJ NEW DRUG ADDON: CPT

## 2018-05-30 RX ORDER — SODIUM CHLORIDE 9 MG/ML
1000 INJECTION, SOLUTION INTRAVENOUS CONTINUOUS
Status: DISCONTINUED | OUTPATIENT
Start: 2018-05-30 | End: 2018-05-30 | Stop reason: HOSPADM

## 2018-05-30 RX ORDER — DIPHENHYDRAMINE HYDROCHLORIDE 50 MG/ML
25 INJECTION INTRAMUSCULAR; INTRAVENOUS ONCE
Status: COMPLETED | OUTPATIENT
Start: 2018-05-30 | End: 2018-05-30

## 2018-05-30 RX ORDER — KETOROLAC TROMETHAMINE 30 MG/ML
30 INJECTION, SOLUTION INTRAMUSCULAR; INTRAVENOUS ONCE
Status: COMPLETED | OUTPATIENT
Start: 2018-05-30 | End: 2018-05-30

## 2018-05-30 RX ADMIN — PROCHLORPERAZINE EDISYLATE 10 MG: 5 INJECTION INTRAMUSCULAR; INTRAVENOUS at 19:45

## 2018-05-30 RX ADMIN — SODIUM CHLORIDE 1000 ML: 9 INJECTION, SOLUTION INTRAVENOUS at 19:46

## 2018-05-30 RX ADMIN — KETOROLAC TROMETHAMINE 30 MG: 30 INJECTION, SOLUTION INTRAMUSCULAR at 19:45

## 2018-05-30 RX ADMIN — DIPHENHYDRAMINE HYDROCHLORIDE 25 MG: 50 INJECTION, SOLUTION INTRAMUSCULAR; INTRAVENOUS at 19:45

## 2018-05-30 ASSESSMENT — ENCOUNTER SYMPTOMS
SEIZURES: 0
HEADACHES: 1
BRUISES/BLEEDS EASILY: 0
LIGHT-HEADEDNESS: 1
WEAKNESS: 0
CHILLS: 0
FEVER: 0
VOMITING: 0
CHEST TIGHTNESS: 0
SHORTNESS OF BREATH: 0
NECK STIFFNESS: 0
NECK PAIN: 0
NAUSEA: 1
ABDOMINAL PAIN: 0
PALPITATIONS: 0
PHOTOPHOBIA: 1
DIZZINESS: 1

## 2018-05-30 NOTE — ED AVS SNAPSHOT
[facility]    [address]    [facility] [address]    Phone:  [phone]                                       Valorie Awad   MRN: [id number]    Department:  [facility]   Date of Visit:  5/30/2018           Patient Information     Date Of Birth          1995        Your diagnoses for this visit were:     Migraine without status migrainosus, not intractable, unspecified migraine type     Vasovagal syncope        You were seen by Nataliia Dominique PA-C.      Follow-up Information     Follow up with Bisi Muro MD.    Specialty:  Family Practice    Why:  As needed    Contact information:    [address]  [facility] [address] [phone]          Follow up with [facility].    Specialty:  EMERGENCY MEDICINE    Why:  If symptoms worsen    Contact information:    [address] [address] [phone]    Additional information:    From [place]: Take [address]. Turn left at [address]. Turn left at the first stoplight on [address]. At the first stop sign, take a right onto [address]. Take a left into the parking lot and continue through until you reach the North enterance of the building.       From [place]: Take [address]. Take the [address] ramp towards [place]. Turn left onto [address]. Take a slight right onto [address]. Turn left at the first stoplight on [address]. At the first stop sign, take a right onto [address]. Take a left into the parking lot and continue through until you reach the North enterance of the building.       From [place]: Take [address]. Take a right at [address]. At the first stop sign, take a right onto [address]. Take a left into the parking lot and continue through until you reach the North enterance of the building.         Discharge Instructions       Go home and rest.    Follow-up in the clinic as  needed.     Please return here for any other concerns.     Discharge References/Attachments     SYNCOPE, VASOVAGAL (ENGLISH)    HEADACHE, MIGRAINE (CLASSICAL) (ENGLISH)         Review of your medicines      Our records show that you are taking the medicines listed below. If these are incorrect, please call your family doctor or clinic.        Dose / Directions Last dose taken    albuterol 108 (90 Base) MCG/ACT Inhaler   Commonly known as:  PROAIR HFA/PROVENTIL HFA/VENTOLIN HFA   Dose:  2 puff   Quantity:  1 Inhaler        Inhale 2 puffs into the lungs every 6 hours as needed for shortness of breath / dyspnea or wheezing   Refills:  0        clonazePAM 2 MG tablet   Commonly known as:  klonoPIN   Dose:  2 mg   Quantity:  30 tablet        Take 1 tablet (2 mg) by mouth daily as needed for anxiety   Refills:  5        desogestrel-ethinyl estradiol 0.15-30 MG-MCG per tablet   Commonly known as:  APRI   Dose:  1 tablet   Quantity:  84 tablet        Take 1 tablet by mouth daily   Refills:  6        escitalopram 10 MG tablet   Commonly known as:  LEXAPRO   Quantity:  30 tablet        Take 1/2 tablet daily for 1 week then increase to 1 tablet daily   Refills:  3        nabumetone 500 MG tablet   Commonly known as:  RELAFEN   Dose:  500-1000 mg   Quantity:  60 tablet        Take 1-2 tablets (500-1,000 mg) by mouth 2 times daily as needed for moderate pain   Refills:  1        SUMAtriptan 100 MG tablet   Commonly known as:  IMITREX   Dose:  100 mg   Quantity:  9 tablet        Take 1 tablet (100 mg) by mouth at onset of headache for migraine May repeat in 2 hours if needed: max 2/day; average number of headaches monthly   Refills:  3        topiramate 25 MG tablet   Commonly known as:  TOPAMAX   Quantity:  60 tablet        Take one 25 mg tablet in the evening for one week then increase to one 25 mg tablet twice daily   Refills:  1        TYLENOL PO   Dose:  1000 mg        Take 1,000 mg by mouth every 6 hours as needed for mild  "pain or fever   Refills:  0                Procedures and tests performed during your visit     EKG 12-lead, tracing only    HCG qualitative urine (UPT)    Peripheral IV catheter    UA reflex to Microscopic      Orders Needing Specimen Collection     None      Pending Results     Date and Time Order Name Status Description    2018 UA reflex to Microscopic In process     2018 HCG qualitative urine (UPT) In process             Pending Culture Results     Date and Time Order Name Status Description    2018 UA reflex to Microscopic In process     2018 HCG qualitative urine (UPT) In process             Thank you for choosing [facility]       Thank you for choosing [facility] for your care. Our goal is always to provide you with excellent care. Hearing back from our patients is one way we can continue to improve our services. Please take a few minutes to complete the written survey that you may receive in the mail after you visit with us. Thank you!        [facility] Information     [employer] lets you send messages to your doctor, view your test results, renew your prescriptions, schedule appointments and more. To sign up, go to [email or web address] . Click on \"Log in\" on the left side of the screen, which will take you to the Welcome page. Then click on \"Sign up Now\" on the right side of the page.     You will be asked to enter the access code listed below, as well as some personal information. Please follow the directions to create your username and password.     Your access code is: [email or web address]  Expires: 2018  8:26 PM     Your access code will  in 90 days. If you need help or a new code, please call your [facility] clinic or [phone].        Care EveryWhere ID     This is your Care EveryWhere ID. This could be used by other organizations to access your [facility] medical records  [id number]        Equal Access to Services     MYLENE STROUD AH: Titus Craig, " christianne lindo, rafita terry. So [facility] [phone].    ATENCIÓN: Si habla español, tiene a yao disposición servicios gratuitos de asistencia lingüística. Llame al [phone].    We comply with applicable federal civil rights laws and [place] laws. We do not discriminate on the basis of race, color, national origin, age, disability, sex, sexual orientation, or gender identity.            After Visit Summary       This is your record. Keep this with you and show to your community pharmacist(s) and doctor(s) at your next visit.

## 2018-05-30 NOTE — ED AVS SNAPSHOT
[facility]    [address] [address]    Phone:  [phone]                                       Valorie Awad   MRN: [id number]    Department:  [facility]   Date of Visit:  5/30/2018           After Visit Summary Signature Page     I have received my discharge instructions, and my questions have been answered. I have discussed any challenges I see with this plan with the nurse or doctor.    ..........................................................................................................................................  Patient/Patient Representative Signature      ..........................................................................................................................................  Patient Representative Print Name and Relationship to Patient    ..................................................               ................................................  Date                                            Time    ..........................................................................................................................................  Reviewed by Signature/Title    ...................................................              ..............................................  Date                                                            Time

## 2018-05-31 NOTE — ED PROVIDER NOTES
History     Chief Complaint   Patient presents with     Headache     c/o migraine h/a, notes fainted for a couple of seconds, notes got up off the cough and got dizzy and fell over, notes fell on the floor and denies injury     The history is provided by the patient.     Valorie Awad is a 23 year old female who presented to the emergency department ambulatory for evaluation of a migraine headache.  Tells me that the headache began this morning.  She attempted to treated with her usual Imitrex and nap.  This did not seem to work.  This is not the worst headache of her life.  Migraine is identical to her previous migraines.  No recent falls.  No fevers.  She does tell me that she was attempting to get off her couch and had what sounds to be vasovagal syncope.  Denies any head injury.  Denies any neck pain.  Denies any chest pains or pressures.  Denies any cyanosis.  Denies any shortness of breath.  Migraine is described as behind her eyes, pulsating, and an 8 on the 10 scale.    Problem List:    Patient Active Problem List    Diagnosis Date Noted     ACP (advance care planning) 03/22/2017     Priority: Medium     Advance Care Planning 7/6/2017: ACP Review of Chart / Resources Provided:  Reviewed chart for advance care plan.  Valorie Awad has no plan or code status on file. Discussed available resources and provided with information.   Added by KARLY CORBETT                 Spasm of muscle 08/12/2015     Priority: Medium     Migraine 10/10/2007     Priority: Medium     Problem list name updated by automated process. Provider to review       Anxiety state 09/20/2001     Priority: Medium     Problem list name updated by automated process. Provider to review          Past Medical History:    Past Medical History:   Diagnosis Date     Abnormal Pap smear      Anxiety 9/20/2001     Marijuana use      Migraine headaches 10/10/2007     Supervision of other normal pregnancy        Past Surgical History:    Past Surgical  History:   Procedure Laterality Date     Comminuted fracture elbow Left 10/25/2013    lauren placed     CYSTOSCOPY      bladder distention     TONSILLECTOMY         Family History:    Family History   Problem Relation Age of Onset     Other - See Comments Father      alcoholism     Depression Father      MENTAL ILLNESS Father      Depression Mother      Irritable Bowel Syndrome Mother      MENTAL ILLNESS Mother      Other - See Comments Mother      migraine headache     Other - See Comments Maternal Grandmother      blood disease     Lipids Maternal Grandmother      hyperlipidemia     Thyroid Disease Maternal Grandmother      DIABETES No family hx of      Asthma No family hx of        Social History:  Marital Status:  Single [1]  Social History   Substance Use Topics     Smoking status: Never Smoker     Smokeless tobacco: Never Used     Alcohol use 0.0 oz/week     0 Standard drinks or equivalent per week      Comment: social        Medications:      Acetaminophen (TYLENOL PO)   clonazePAM (KLONOPIN) 2 MG tablet   desogestrel-ethinyl estradiol (APRI) 0.15-30 MG-MCG per tablet   escitalopram (LEXAPRO) 10 MG tablet   nabumetone (RELAFEN) 500 MG tablet   SUMAtriptan (IMITREX) 100 MG tablet   topiramate (TOPAMAX) 25 MG tablet   albuterol (PROAIR HFA/PROVENTIL HFA/VENTOLIN HFA) 108 (90 BASE) MCG/ACT Inhaler         Review of Systems   Constitutional: Negative for chills and fever.   Eyes: Positive for photophobia.   Respiratory: Negative for chest tightness and shortness of breath.    Cardiovascular: Negative for chest pain, palpitations and leg swelling.   Gastrointestinal: Positive for nausea. Negative for abdominal pain and vomiting.   Genitourinary: Negative.    Musculoskeletal: Negative for neck pain and neck stiffness.   Skin: Negative.    Neurological: Positive for dizziness, syncope, light-headedness and headaches. Negative for seizures and weakness.   Hematological: Does not bruise/bleed easily.       Physical Exam    BP: 115/78  Pulse: 68  Heart Rate: 106  Temp: 97.8  F (36.6  C)  Resp: 16 (crying)  SpO2: 96 %      Physical Exam   Constitutional: She is oriented to person, place, and time. She appears well-developed and well-nourished.   Eyes: Conjunctivae and EOM are normal. Pupils are equal, round, and reactive to light.   Neck: Normal range of motion. Neck supple.   Cardiovascular: Regular rhythm.    Mild tachycardia   Pulmonary/Chest: Effort normal and breath sounds normal.   Abdominal: Soft. There is no tenderness.   Lymphadenopathy:     She has no cervical adenopathy.   Neurological: She is alert and oriented to person, place, and time.   Neurological examination:  That the patient was awake and alert, the attention, orientation, concentration, language, memory and fund of knowledge were all normal.  The patient had no neglect or apraxia.    Cranial nerve examination: revealed that for cranial nerve   II: the pupils were reactive and the visual field were full  III, IV, and VI, the extraocular movements were full.    VII: facial movements are symmetric  VIII: hearing intact to voice  IX & X: the soft palate rises symmetrically   XI: shoulder movements are symmetric  XII: tongue is midline    Normal gait.   Normal speech    Skin: Skin is warm and dry.   Psychiatric: She has a normal mood and affect.   Nursing note and vitals reviewed.      ED Course     ED Course     Procedures  EKG shows a normal sinus rhythm with a ventricular rate of 72.  LA interval is normal.  QRS duration is normal.  Normal QTC.  Normal axis.  Normal P-wave duration.  There is no evidence of ST elevation or depression.  No T-wave concerns.  No preexcitation.        Medications   0.9% sodium chloride BOLUS (0 mLs Intravenous Stopped 5/30/18 2026)     Followed by   sodium chloride 0.9% infusion (not administered)   ketorolac (TORADOL) injection 30 mg (30 mg Intravenous Given 5/30/18 1945)   prochlorperazine (COMPAZINE) injection 10 mg (10 mg  Intravenous Given 5/30/18 1945)   diphenhydrAMINE (BENADRYL) injection 25 mg (25 mg Intravenous Given 5/30/18 1945)        Critical Care time:  none               Results for orders placed or performed during the hospital encounter of 05/30/18 (from the past 24 hour(s))   HCG qualitative urine (UPT)   Result Value Ref Range    HCG Qual Urine Negative NEG^Negative   UA reflex to Microscopic   Result Value Ref Range    Color Urine Light Yellow     Appearance Urine Clear     Glucose Urine Negative NEG^Negative mg/dL    Bilirubin Urine Negative NEG^Negative    Ketones Urine Negative NEG^Negative mg/dL    Specific Gravity Urine 1.007 1.003 - 1.035    Blood Urine Moderate (A) NEG^Negative    pH Urine 6.5 4.7 - 8.0 pH    Protein Albumin Urine Negative NEG^Negative mg/dL    Urobilinogen mg/dL Normal 0.0 - 2.0 mg/dL    Nitrite Urine Negative NEG^Negative    Leukocyte Esterase Urine Moderate (A) NEG^Negative    Source Midstream Urine     RBC Urine 24 (H) 0 - 2 /HPF    WBC Urine 28 (H) 0 - 5 /HPF    Bacteria Urine Few (A) NEG^Negative /HPF    Mucous Urine Present (A) NEG^Negative /LPF       Medications   0.9% sodium chloride BOLUS (0 mLs Intravenous Stopped 5/30/18 2026)     Followed by   sodium chloride 0.9% infusion (not administered)   ketorolac (TORADOL) injection 30 mg (30 mg Intravenous Given 5/30/18 1945)   prochlorperazine (COMPAZINE) injection 10 mg (10 mg Intravenous Given 5/30/18 1945)   diphenhydrAMINE (BENADRYL) injection 25 mg (25 mg Intravenous Given 5/30/18 1945)       Assessments & Plan (with Medical Decision Making)   Treatment as above.  History of present illness, exam, and symptoms are most consistent with her classic migraine.  HPI is also consistent with mild vasovagal syncope.  EKG is unremarkable.  Urine pregnancy test is negative.  She does have pyuria, but no lower urinary tract symptoms.  I am going to culture her urine and hold off on therapy.  She already has multiple antibiotic allergies.   Negative neurological examination and normal gait.  Treated with typical migraine cocktail with excellent results.  Valorie requested discharge home and this is certainly the most reasonable disposition.  HPI, exam, and symptoms are not consistent with meningitis, subarachnoid hemorrhage, cerebral venous thrombosis, or other intracranial catastrophe.  Rest and stay hydrated.  Return here for any worsening symptoms or other concerns.  I do not believe that there is any medical indication for further laboratory work at this time.  Her syncope can be easily explained, in addition of CBC and BMP will have very low yield.  I believe the most important workup is an EKG and urine pregnancy test.    (Please note that this note was completed with a voice recognition program.  Every attempt was made to edit the dictations, but inevitably there remain words that are mis-transcribed.)    I have reviewed the nursing notes.    I have reviewed the findings, diagnosis, plan and need for follow up with the patient.       Discharge Medication List as of 5/30/2018  8:27 PM          Final diagnoses:   Migraine without status migrainosus, not intractable, unspecified migraine type   Vasovagal syncope       5/30/2018   [facility]     Nataliia Dominique PA-C  05/30/18 2045

## 2018-05-31 NOTE — ED NOTES
Pt ambulatory to ED room 11 with c/o a migraine headache that started today. Pt states that this is not the worst headache she has ever had and is not different than her usual migraine pain. The only difference with this migraine is that she had a syncopal episode earlier today after becoming dizzy. Pt denies hitting head and also denies injury. Pt alert and oriented sitting on ED cart. No vomiting or diarrhea. Nausea, photophobia, and dizziness noted.

## 2018-05-31 NOTE — ED NOTES
Discharge instructions reviewed with patient, and patient verbalized understanding. Pt ambulatory to exit with grandmother.

## 2018-05-31 NOTE — DISCHARGE INSTRUCTIONS
Go home and rest.    Follow-up in the clinic as needed.     Please return here for any other concerns.

## 2018-06-01 LAB
BACTERIA SPEC CULT: ABNORMAL
BACTERIA SPEC CULT: ABNORMAL
SPECIMEN SOURCE: ABNORMAL

## 2018-07-03 DIAGNOSIS — G43.719 INTRACTABLE CHRONIC MIGRAINE WITHOUT AURA AND WITHOUT STATUS MIGRAINOSUS: ICD-10-CM

## 2018-07-03 NOTE — TELEPHONE ENCOUNTER
Refill request for :  Topiramate (Topamax) 25 mg tab  Last refill 3-15-18 , qty #60 with 1 refill  Last office visit 3-15-18

## 2018-07-05 RX ORDER — TOPIRAMATE 25 MG/1
TABLET, FILM COATED ORAL
Qty: 60 TABLET | Refills: 1 | Status: SHIPPED | OUTPATIENT
Start: 2018-07-05 | End: 2018-10-05

## 2018-07-05 NOTE — TELEPHONE ENCOUNTER
Please see note below for Topamax.  Patient due for labs.  Please advise.  Thank you.       Normal CBC on file in past 26 months           Recent Labs   Lab Test  12/08/15   1057   WBC  12.4*   RBC  4.37   HGB  13.2   HCT  38.1   PLT  270      For [facility] ONLY: [id number] = WBC, [id number] = RBC               Normal ALT or AST on file in past 26 months           Recent Labs   Lab Test  09/12/14   1326   ALT  21       Recent Labs   Lab Test  09/12/14   1326   AST  10                  Normal platelet count on file in past 26 months           Recent Labs   Lab Test  12/08/15   1057   PLT  270

## 2018-07-19 ENCOUNTER — HOSPITAL ENCOUNTER (EMERGENCY)
Facility: HOSPITAL | Age: 23
Discharge: HOME | End: 2018-07-19
Attending: FAMILY MEDICINE | Admitting: FAMILY MEDICINE
Payer: COMMERCIAL

## 2018-07-19 VITALS
SYSTOLIC BLOOD PRESSURE: 115 MMHG | OXYGEN SATURATION: 99 % | DIASTOLIC BLOOD PRESSURE: 73 MMHG | TEMPERATURE: 98.5 F | RESPIRATION RATE: 16 BRPM | HEART RATE: 89 BPM

## 2018-07-19 DIAGNOSIS — B96.89 BACTERIAL VAGINOSIS: ICD-10-CM

## 2018-07-19 DIAGNOSIS — N76.0 BACTERIAL VAGINOSIS: ICD-10-CM

## 2018-07-19 LAB
ALBUMIN SERPL-MCNC: 4.3 G/DL (ref 3.4–5)
ALBUMIN UR-MCNC: 10 MG/DL
ALP SERPL-CCNC: 90 U/L (ref 40–150)
ALT SERPL W P-5'-P-CCNC: 32 U/L (ref 0–50)
ANION GAP SERPL CALCULATED.3IONS-SCNC: 9 MMOL/L (ref 3–14)
APPEARANCE UR: ABNORMAL
AST SERPL W P-5'-P-CCNC: 22 U/L (ref 0–45)
BACTERIA #/AREA URNS HPF: ABNORMAL /HPF
BASOPHILS # BLD AUTO: 0 10E9/L (ref 0–0.2)
BASOPHILS NFR BLD AUTO: 0.3 %
BILIRUB SERPL-MCNC: 0.5 MG/DL (ref 0.2–1.3)
BILIRUB UR QL STRIP: NEGATIVE
BUN SERPL-MCNC: 8 MG/DL (ref 7–30)
C TRACH DNA SPEC QL PROBE+SIG AMP: NOT DETECTED
CALCIUM SERPL-MCNC: 9.5 MG/DL (ref 8.5–10.1)
CHLORIDE SERPL-SCNC: 105 MMOL/L (ref 94–109)
CO2 SERPL-SCNC: 28 MMOL/L (ref 20–32)
COLOR UR AUTO: YELLOW
CREAT SERPL-MCNC: 0.66 MG/DL (ref 0.52–1.04)
DIFFERENTIAL METHOD BLD: ABNORMAL
EOSINOPHIL # BLD AUTO: 0.2 10E9/L (ref 0–0.7)
EOSINOPHIL NFR BLD AUTO: 1.1 %
ERYTHROCYTE [DISTWIDTH] IN BLOOD BY AUTOMATED COUNT: 11.7 % (ref 10–15)
GFR SERPL CREATININE-BSD FRML MDRD: >90 ML/MIN/1.7M2
GLUCOSE SERPL-MCNC: 96 MG/DL (ref 70–99)
GLUCOSE UR STRIP-MCNC: NEGATIVE MG/DL
HCG UR QL: NEGATIVE
HCT VFR BLD AUTO: 40.4 % (ref 35–47)
HGB BLD-MCNC: 14.3 G/DL (ref 11.7–15.7)
HGB UR QL STRIP: ABNORMAL
HYALINE CASTS #/AREA URNS LPF: 1 /LPF
IMM GRANULOCYTES # BLD: 0.1 10E9/L (ref 0–0.4)
IMM GRANULOCYTES NFR BLD: 0.5 %
KETONES UR STRIP-MCNC: NEGATIVE MG/DL
LEUKOCYTE ESTERASE UR QL STRIP: ABNORMAL
LYMPHOCYTES # BLD AUTO: 3 10E9/L (ref 0.8–5.3)
LYMPHOCYTES NFR BLD AUTO: 22.9 %
MCH RBC QN AUTO: 31 PG (ref 26.5–33)
MCHC RBC AUTO-ENTMCNC: 35.4 G/DL (ref 31.5–36.5)
MCV RBC AUTO: 88 FL (ref 78–100)
MONOCYTES # BLD AUTO: 0.7 10E9/L (ref 0–1.3)
MONOCYTES NFR BLD AUTO: 5.4 %
MUCOUS THREADS #/AREA URNS LPF: PRESENT /LPF
N GONORRHOEA DNA SPEC QL PROBE+SIG AMP: NOT DETECTED
NEUTROPHILS # BLD AUTO: 9.2 10E9/L (ref 1.6–8.3)
NEUTROPHILS NFR BLD AUTO: 69.8 %
NITRATE UR QL: NEGATIVE
NRBC # BLD AUTO: 0 10*3/UL
NRBC BLD AUTO-RTO: 0 /100
PH UR STRIP: 7 PH (ref 4.7–8)
PLATELET # BLD AUTO: 298 10E9/L (ref 150–450)
POTASSIUM SERPL-SCNC: 3.9 MMOL/L (ref 3.4–5.3)
PROT SERPL-MCNC: 7.4 G/DL (ref 6.8–8.8)
RBC # BLD AUTO: 4.61 10E12/L (ref 3.8–5.2)
RBC #/AREA URNS AUTO: 6 /HPF (ref 0–2)
SODIUM SERPL-SCNC: 142 MMOL/L (ref 133–144)
SOURCE: ABNORMAL
SP GR UR STRIP: 1.01 (ref 1–1.03)
SPECIMEN SOURCE: ABNORMAL
SPECIMEN SOURCE: NORMAL
SQUAMOUS #/AREA URNS AUTO: 10 /HPF (ref 0–1)
UROBILINOGEN UR STRIP-MCNC: NORMAL MG/DL (ref 0–2)
WBC # BLD AUTO: 13.2 10E9/L (ref 4–11)
WBC #/AREA URNS AUTO: 4 /HPF (ref 0–5)
WET PREP SPEC: ABNORMAL

## 2018-07-19 PROCEDURE — 87591 N.GONORRHOEAE DNA AMP PROB: CPT | Performed by: FAMILY MEDICINE

## 2018-07-19 PROCEDURE — 80053 COMPREHEN METABOLIC PANEL: CPT | Performed by: FAMILY MEDICINE

## 2018-07-19 PROCEDURE — 99284 EMERGENCY DEPT VISIT MOD MDM: CPT | Mod: Z6 | Performed by: FAMILY MEDICINE

## 2018-07-19 PROCEDURE — 25000128 H RX IP 250 OP 636: Performed by: FAMILY MEDICINE

## 2018-07-19 PROCEDURE — 81001 URINALYSIS AUTO W/SCOPE: CPT | Performed by: FAMILY MEDICINE

## 2018-07-19 PROCEDURE — 87210 SMEAR WET MOUNT SALINE/INK: CPT | Performed by: FAMILY MEDICINE

## 2018-07-19 PROCEDURE — 85025 COMPLETE CBC W/AUTO DIFF WBC: CPT | Performed by: FAMILY MEDICINE

## 2018-07-19 PROCEDURE — 96361 HYDRATE IV INFUSION ADD-ON: CPT

## 2018-07-19 PROCEDURE — 81025 URINE PREGNANCY TEST: CPT | Performed by: FAMILY MEDICINE

## 2018-07-19 PROCEDURE — 96374 THER/PROPH/DIAG INJ IV PUSH: CPT

## 2018-07-19 PROCEDURE — 36415 COLL VENOUS BLD VENIPUNCTURE: CPT | Performed by: FAMILY MEDICINE

## 2018-07-19 PROCEDURE — 99284 EMERGENCY DEPT VISIT MOD MDM: CPT | Mod: 25

## 2018-07-19 PROCEDURE — 87491 CHLMYD TRACH DNA AMP PROBE: CPT | Performed by: FAMILY MEDICINE

## 2018-07-19 RX ORDER — FLUCONAZOLE 150 MG/1
TABLET ORAL
Qty: 2 TABLET | Refills: 0 | Status: SHIPPED | OUTPATIENT
Start: 2018-07-19 | End: 2023-06-19

## 2018-07-19 RX ORDER — SODIUM CHLORIDE 9 MG/ML
1000 INJECTION, SOLUTION INTRAVENOUS CONTINUOUS
Status: DISCONTINUED | OUTPATIENT
Start: 2018-07-19 | End: 2018-07-19 | Stop reason: HOSPADM

## 2018-07-19 RX ORDER — ONDANSETRON 2 MG/ML
8 INJECTION INTRAMUSCULAR; INTRAVENOUS ONCE
Status: COMPLETED | OUTPATIENT
Start: 2018-07-19 | End: 2018-07-19

## 2018-07-19 RX ORDER — METRONIDAZOLE 500 MG/1
500 TABLET ORAL 2 TIMES DAILY
Qty: 14 TABLET | Refills: 1 | Status: SHIPPED | OUTPATIENT
Start: 2018-07-19 | End: 2023-06-19

## 2018-07-19 RX ADMIN — SODIUM CHLORIDE 1000 ML: 9 INJECTION, SOLUTION INTRAVENOUS at 15:31

## 2018-07-19 RX ADMIN — SODIUM CHLORIDE 1000 ML: 9 INJECTION, SOLUTION INTRAVENOUS at 14:27

## 2018-07-19 RX ADMIN — ONDANSETRON 8 MG: 2 INJECTION, SOLUTION INTRAMUSCULAR; INTRAVENOUS at 14:28

## 2018-07-19 NOTE — ED AVS SNAPSHOT
[facility]    [address] [place] [id number]    Phone:  [phone]                                       Valorie Awad   MRN: [id number]    Department:  [facility]   Date of Visit:  7/19/2018           Patient Information     Date Of Birth          1995        Your diagnoses for this visit were:     Bacterial vaginosis        You were seen by Alondra Fontana MD.      Follow-up Information     Follow up with Bisi Muro MD.    Specialty:  Family Practice    Contact information:    [address] [place] [address]  [phone]          Discharge Instructions         Vaginal Infection: Bacterial Vaginosis  Both good and bad bacteria are present in a healthy vagina. Bacterial vaginosis (BV) occurs when these bacteria get out of balance. The numbers of good bacteria decrease. This allows the numbers of bad bacteria to increase and cause BV. In most cases, BV is not a serious problem.  Causes of bacterial vaginosis  The cause of BV is not clear. Douching may lead to it. Having sex with a new partner or more than 1 partner makes it more likely.  Symptoms of bacterial vaginosis  Symptoms of BV vary for each woman. Some women have few symptoms or none at all. If symptoms are present, they can include:    Thin, milky white or gray or sometimes green discharge    Unpleasant  fishy  odor    Irritation, itching, and burning at opening of vagina which may indicate mixed vaginitis      Burning or irritation with sex or urination which may indicate mixed vaginitis  Diagnosing bacterial vaginosis  Your healthcare provider will ask about your symptoms and health history. He or she will also do a pelvic exam. This is an exam of your vagina and cervix. A sample of vaginal fluid or discharge may be taken. This sample is checked for signs of BV.  Treating bacterial vaginosis  BV is often treated with antibiotics. They may be given in oral pill form or as a vaginal cream. To use  these medicines:    Be sure to take all of your medicine, even if your symptoms go away.    If you re taking antibiotic pills, do not drink alcohol until you re finished with all of your medicine.    If you re using vaginal cream, apply it as directed. Be aware that the cream may make condoms and diaphragms less effective.    Call your healthcare provider if symptoms do not go away within 4 days of starting treatment. Also call if you have a reaction to the medicine.  Why treatment matters  Even if you have no symptoms or your symptoms are mild, BV should be treated. Untreated BV can lead to health problems such as:    Increased risk of  delivery if you re pregnant    Increased risk of complications after surgery on the reproductive organs    Possible increased risk of pelvic inflammatory disease (PID)   Date Last Reviewed: 3/1/2017    1814-7646 [employer]. [address] [place] [address], [place] [address]. All rights reserved. This information is not intended as a substitute for professional medical care. Always follow your healthcare professional's instructions.          Your next 10 appointments already scheduled     2018  8:20 AM CDT   (Arrive by 8:05 AM)   Office Visit with CARLOS Silva CNP   [facility] ([facility] )    [address] [place] [id number] [phone]           Bring a current list of meds and any records pertaining to this visit. For Physicals, please bring immunization records and any forms needing to be filled out. Please arrive 10 minutes early to complete paperwork.                 Review of your medicines      START taking        Dose / Directions Last dose taken    fluconazole 150 MG tablet   Commonly known as:  DIFLUCAN   Quantity:  2 tablet        Take one tablet now, and one tablet in three days   Refills:  0        metroNIDAZOLE 500 MG tablet   Commonly known as:  FLAGYL   Dose:  500 mg   Quantity:  14 tablet         Take 1 tablet (500 mg) by mouth 2 times daily for 7 days   Refills:  1          Our records show that you are taking the medicines listed below. If these are incorrect, please call your family doctor or clinic.        Dose / Directions Last dose taken    clonazePAM 2 MG tablet   Commonly known as:  klonoPIN   Dose:  2 mg   Quantity:  30 tablet        Take 1 tablet (2 mg) by mouth daily as needed for anxiety   Refills:  5        escitalopram 10 MG tablet   Commonly known as:  LEXAPRO   Quantity:  30 tablet        Take 1/2 tablet daily for 1 week then increase to 1 tablet daily   Refills:  3        ISIBLOOM PO        Take by mouth daily   Refills:  0        nabumetone 500 MG tablet   Commonly known as:  RELAFEN   Dose:  500-1000 mg   Quantity:  60 tablet        Take 1-2 tablets (500-1,000 mg) by mouth 2 times daily as needed for moderate pain   Refills:  1        SUMAtriptan 100 MG tablet   Commonly known as:  IMITREX   Dose:  100 mg   Quantity:  9 tablet        Take 1 tablet (100 mg) by mouth at onset of headache for migraine May repeat in 2 hours if needed: max 2/day; average number of headaches monthly   Refills:  3        topiramate 25 MG tablet   Commonly known as:  TOPAMAX   Quantity:  60 tablet        TAKE 1 TABLET BY MOUTH EVERY EVENING FOR 1 WEEK, THEN INCREASE TO 1 TABLET BY MOUTH TWICE DAILY   Refills:  1        TYLENOL PO   Dose:  1000 mg        Take 1,000 mg by mouth every 6 hours as needed for mild pain or fever   Refills:  0                Prescriptions were sent or printed at these locations (2 Prescriptions)                   [facility] [address] [place] [address] [place] [address] AT [facility] [address] [place] [address]    Telephone:  [phone]   Fax:  [phone]   Hours:                  E-Prescribed (2 of 2)         fluconazole (DIFLUCAN) 150 MG tablet               metroNIDAZOLE (FLAGYL) 500 MG tablet                Procedures and tests performed during your  "visit     CBC with platelets differential    Comprehensive metabolic panel    GC/Chlamydia by PCR - [facility]    HCG qualitative urine    UA with Microscopic    Wet prep      Orders Needing Specimen Collection     None      Pending Results     Date and Time Order Name Status Description    2018 1556 GC/Chlamydia by PCR - [facility] In process             Pending Culture Results     Date and Time Order Name Status Description    2018 1556 GC/Chlamydia by PCR - [facility] In process             Thank you for choosing [facility]       Thank you for choosing [facility] for your care. Our goal is always to provide you with excellent care. Hearing back from our patients is one way we can continue to improve our services. Please take a few minutes to complete the written survey that you may receive in the mail after you visit with us. Thank you!        [employer] Information     [employer] lets you send messages to your doctor, view your test results, renew your prescriptions, schedule appointments and more. To sign up, go to [email or web address] . Click on \"Log in\" on the left side of the screen, which will take you to the Welcome page. Then click on \"Sign up Now\" on the right side of the page.     You will be asked to enter the access code listed below, as well as some personal information. Please follow the directions to create your username and password.     Your access code is: [email or web address]  Expires: 2018  8:26 PM     Your access code will  in 90 days. If you need help or a new code, please call your [facility] clinic or [phone].        Care EveryWhere ID     This is your Care EveryWhere ID. This could be used by other organizations to access your [facility] medical records  [id number]        Equal Access to Services     [facility]: Hadii dilip Craig, waaxda luqadaha, qaybta rafita roberts. So [facility] [phone].    ATENCIÓN: Si cinthia felipe a yao " disposición servicios gratuitos de asistencia lingüística. Caterina al [phone].    We comply with applicable federal civil rights laws and [place] laws. We do not discriminate on the basis of race, color, national origin, age, disability, sex, sexual orientation, or gender identity.            After Visit Summary       This is your record. Keep this with you and show to your community pharmacist(s) and doctor(s) at your next visit.

## 2018-07-19 NOTE — ED PROVIDER NOTES
eMERGENCY dEPARTMENT eNCOUnter        CHIEF COMPLAINT    Chief Complaint   Patient presents with     Pelvic Pressure     no pain just pressure started      Dizziness     dizzy for a couple days off and on, vomited x 2       HPI    Valorie Awad is a 23 year old female who presents with 4 days of pelvic pressure and one day of dizziness.  She does not specifically have pelvic pain but describes it as a lower abdominal pressure.  She has had PID in the past.  She states she is sexually active with one partner.  She has a 5-year-old child  1 para 0.  She had an irregular menses 3 weeks ago.  She describes the dizziness as a kind of lightheadedness when she stands up.  Not vertigo.  She feels little nauseated.  She has a history of migraines and is in the emergency department frequently for these.  Denies fevers, no urinary symptoms, no discharge    REVIEW OF SYSTEMS    General: No fevers or chills  : No dysuria or flank pain  GI: No vomiting or diarrhea  Pulmonary: No difficulty breathing or cough  Neurologic: No loss of consciousness or syncope  See HPI for further details.  All other systems reviewed and are negative.    PAST MEDICAL & SURGICAL HISTORY    Past Medical History:   Diagnosis Date     Abnormal Pap smear      Anxiety 2001     Marijuana use      Migraine headaches 10/10/2007     Supervision of other normal pregnancy      Past Surgical History:   Procedure Laterality Date     Comminuted fracture elbow Left 10/25/2013    lauren placed     CYSTOSCOPY      bladder distention     TONSILLECTOMY         CURRENT MEDICATIONS    Current Outpatient Rx   Medication Sig Dispense Refill     Acetaminophen (TYLENOL PO) Take 1,000 mg by mouth every 6 hours as needed for mild pain or fever       Desogestrel-Ethinyl Estradiol (ISIBLOOM PO) Take by mouth daily       escitalopram (LEXAPRO) 10 MG tablet Take 1/2 tablet daily for 1 week then increase to 1 tablet daily 30 tablet 3     fluconazole (DIFLUCAN) 150  MG tablet Take one tablet now, and one tablet in three days 2 tablet 0     metroNIDAZOLE (FLAGYL) 500 MG tablet Take 1 tablet (500 mg) by mouth 2 times daily for 7 days 14 tablet 1     nabumetone (RELAFEN) 500 MG tablet Take 1-2 tablets (500-1,000 mg) by mouth 2 times daily as needed for moderate pain 60 tablet 1     SUMAtriptan (IMITREX) 100 MG tablet Take 1 tablet (100 mg) by mouth at onset of headache for migraine May repeat in 2 hours if needed: max 2/day; average number of headaches monthly 9 tablet 3     topiramate (TOPAMAX) 25 MG tablet TAKE 1 TABLET BY MOUTH EVERY EVENING FOR 1 WEEK, THEN INCREASE TO 1 TABLET BY MOUTH TWICE DAILY 60 tablet 1     clonazePAM (KLONOPIN) 2 MG tablet Take 1 tablet (2 mg) by mouth daily as needed for anxiety 30 tablet 5       ALLERGIES    Allergies   Allergen Reactions     Bactrim [Sulfamethoxazole W-Trimethoprim] Rash     Morphine Hcl Nausea and Vomiting     Clindamycin Other (See Comments)     Thrush       Nitrofurantoin      Macrobid      Nitrofurantoin Monohydrate Macrocrystals      Macrobid      Loracarbef Rash     Lorabid       FAMILY AND SOCIAL HISTORY    Family History   Problem Relation Age of Onset     Other - See Comments Father      alcoholism     Depression Father      Mental Illness Father      Depression Mother      Irritable Bowel Syndrome Mother      Mental Illness Mother      Other - See Comments Mother      migraine headache     Other - See Comments Maternal Grandmother      blood disease     Lipids Maternal Grandmother      hyperlipidemia     Thyroid Disease Maternal Grandmother      Diabetes No family hx of      Asthma No family hx of      Social History     Social History     Marital status: Single     Spouse name: N/A     Number of children: N/A     Years of education: N/A     Occupational History     student      Social History Main Topics     Smoking status: Never Smoker     Smokeless tobacco: Never Used     Alcohol use 0.0 oz/week     0 Standard drinks or  equivalent per week      Comment: social     Drug use: No     Sexual activity: Yes     Partners: Male     Birth control/ protection: Injection     Other Topics Concern      Service No     Blood Transfusions Yes     Caffeine Concern No     Seat Belt Yes     Parent/Sibling W/ Cabg, Mi Or Angioplasty Before 65f 55m? No     Social History Narrative       PHYSICAL EXAM    VITAL SIGNS: /73  Pulse 89  Temp 98.5  F (36.9  C) (Tympanic)  Resp 16  SpO2 99%   Constitutional:  Well-developed, well-nourished, appears comfortable  Eyes:  Non-icteric sclera, no conjunctival injection   HENT:  Atraumatic, external nose normal.   NECK: Supple, no JVD   Respiratory:  No respiratory distress, normal breath sounds   Cardiovascular:  Normal rate, no murmurs  GI:  Abdominal is soft, there is no tenderness, Bowel sounds unremarkable   Pelvic:  Vulva normal.  She has a large amount of frothy white discharge.  :  No CVA tenderness  Integument:  Nondiaphoretic Skin, no obvious rashes  Neurologic: Awake and oriented, no slurred speech        ED COURSE & MEDICAL DECISION MAKING    Pertinent Labs & Imaging studies reviewed and interpreted. (See chart for details)  See chart for details of medications given during the ED stay.      Vitals:    07/19/18 1326 07/19/18 1626   BP: 121/72 115/73   Pulse: 89    Resp: 16    Temp: 98.5  F (36.9  C)    TempSrc: Tympanic    SpO2: 99%        FINAL IMPRESSION    Bacterial vaginosis  Yeast Vaginitis    Plan: Flagyl 500 bid x 7 days and diflucan.  Other tests negative.  Follow up with olayinka diana.  She was given a liter of fluids here and felt better, dizziness was gone.        (Please note that this note was completed with a voice recognition program.  Every attempt was made to edit the dictations, but inevitably there remain words that are mis-transcribed.)       Alondra Fontana MD  07/19/18 7980

## 2018-07-19 NOTE — ED AVS SNAPSHOT
[facility]    [address] [address]    Phone:  [phone]                                       Valorie Awad   MRN: [id number]    Department:  [facility]   Date of Visit:  7/19/2018           After Visit Summary Signature Page     I have received my discharge instructions, and my questions have been answered. I have discussed any challenges I see with this plan with the nurse or doctor.    ..........................................................................................................................................  Patient/Patient Representative Signature      ..........................................................................................................................................  Patient Representative Print Name and Relationship to Patient    ..................................................               ................................................  Date                                            Time    ..........................................................................................................................................  Reviewed by Signature/Title    ...................................................              ..............................................  Date                                                            Time

## 2018-07-19 NOTE — ED NOTES
"Ambulated to room 5 independently, gown placed, call light in reach.  Intermittent dizziness, nausea, vomiting since Sunday.  Vomited  x 2 yesterday.  Lower abd pressure started yesterday.  Constant pressure, denies pain.  \"Something doesn't feel right.\"  LMP  7/3/18 - irregular.  Pregnancy test negative yesterday.  STD 2 years ago.    "

## 2018-07-19 NOTE — DISCHARGE INSTRUCTIONS
Vaginal Infection: Bacterial Vaginosis  Both good and bad bacteria are present in a healthy vagina. Bacterial vaginosis (BV) occurs when these bacteria get out of balance. The numbers of good bacteria decrease. This allows the numbers of bad bacteria to increase and cause BV. In most cases, BV is not a serious problem.  Causes of bacterial vaginosis  The cause of BV is not clear. Douching may lead to it. Having sex with a new partner or more than 1 partner makes it more likely.  Symptoms of bacterial vaginosis  Symptoms of BV vary for each woman. Some women have few symptoms or none at all. If symptoms are present, they can include:    Thin, milky white or gray or sometimes green discharge    Unpleasant  fishy  odor    Irritation, itching, and burning at opening of vagina which may indicate mixed vaginitis      Burning or irritation with sex or urination which may indicate mixed vaginitis  Diagnosing bacterial vaginosis  Your healthcare provider will ask about your symptoms and health history. He or she will also do a pelvic exam. This is an exam of your vagina and cervix. A sample of vaginal fluid or discharge may be taken. This sample is checked for signs of BV.  Treating bacterial vaginosis  BV is often treated with antibiotics. They may be given in oral pill form or as a vaginal cream. To use these medicines:    Be sure to take all of your medicine, even if your symptoms go away.    If you re taking antibiotic pills, do not drink alcohol until you re finished with all of your medicine.    If you re using vaginal cream, apply it as directed. Be aware that the cream may make condoms and diaphragms less effective.    Call your healthcare provider if symptoms do not go away within 4 days of starting treatment. Also call if you have a reaction to the medicine.  Why treatment matters  Even if you have no symptoms or your symptoms are mild, BV should be treated. Untreated BV can lead to health problems such  as:    Increased risk of  delivery if you re pregnant    Increased risk of complications after surgery on the reproductive organs    Possible increased risk of pelvic inflammatory disease (PID)   Date Last Reviewed: 3/1/2017    7249-0281 [employer]. [address] [place] [address], [place] [address]. All rights reserved. This information is not intended as a substitute for professional medical care. Always follow your healthcare professional's instructions.

## 2018-07-19 NOTE — ED NOTES
Pelvic exam by Dr. Fontana. 2 specimen swabs collected and sent to lab.  Pt tolerated procedure well.

## 2018-07-19 NOTE — ED NOTES
DC instructions reviewed with patient.  Patient verbalized understanding and has no further questions.  Patient will call back around 1800 if she does not hear from us re; GC/CHLAMYDIA test results.  Patient home to rest.

## 2018-07-24 DIAGNOSIS — F41.9 ANXIETY: ICD-10-CM

## 2018-07-26 NOTE — TELEPHONE ENCOUNTER
John  Last Written Prescription Date: 1/19/2018  Last Fill Quantity: 30 # of Refills: 5  Last Office Visit: 1/19/18      Brigida Puentes LPN

## 2018-07-27 RX ORDER — CLONAZEPAM 2 MG/1
TABLET ORAL
Qty: 30 TABLET | Refills: 3 | Status: SHIPPED | OUTPATIENT
Start: 2018-07-27 | End: 2018-12-11

## 2018-08-22 ENCOUNTER — TELEPHONE (OUTPATIENT)
Dept: FAMILY MEDICINE | Facility: OTHER | Age: 23
End: 2018-08-22

## 2018-08-22 NOTE — TELEPHONE ENCOUNTER
5:05 PM    Reason for Call: OVERBOOK    Patient is having the following symptoms: swollen sore elbow for surgery couple years ago, now it is getting swollen and in a lot of pain minutes.    The patient is requesting an appointment for Tuesday 08/28/2018 with Dr. Jo Muro.    Was an appointment offered for this call? No  If yes : Appointment type              Date    Preferred method for responding to this message: Telephone Call  What is your phone number ?[phone]    If we cannot reach you directly, may we leave a detailed response at the number you provided? Yes    Can this message wait until your PCP/provider returns, if unavailable today? No, provider is out    Arlet Mast

## 2018-08-23 NOTE — TELEPHONE ENCOUNTER
Please tell patient she contact her surgeon regarding this, or go to the ER/UC. She should wait this long  MARVIN VU

## 2018-08-23 NOTE — TELEPHONE ENCOUNTER
Advised patient as to what the nurse noted below, she understood but was also scheduled to see PCP on 8/29.  She stated that if anything changed she would give us a call back

## 2018-09-12 ENCOUNTER — HOSPITAL ENCOUNTER (EMERGENCY)
Facility: HOSPITAL | Age: 23
Discharge: HOME | End: 2018-09-12
Attending: NURSE PRACTITIONER | Admitting: NURSE PRACTITIONER
Payer: COMMERCIAL

## 2018-09-12 VITALS
TEMPERATURE: 97.1 F | RESPIRATION RATE: 14 BRPM | DIASTOLIC BLOOD PRESSURE: 81 MMHG | SYSTOLIC BLOOD PRESSURE: 122 MMHG | OXYGEN SATURATION: 100 %

## 2018-09-12 DIAGNOSIS — R39.89 GENITAL SORE: ICD-10-CM

## 2018-09-12 DIAGNOSIS — R30.0 DYSURIA: ICD-10-CM

## 2018-09-12 LAB
ALBUMIN UR-MCNC: NEGATIVE MG/DL
APPEARANCE UR: CLEAR
BACTERIA #/AREA URNS HPF: ABNORMAL /HPF
BILIRUB UR QL STRIP: NEGATIVE
COLOR UR AUTO: ABNORMAL
GLUCOSE UR STRIP-MCNC: NEGATIVE MG/DL
HGB UR QL STRIP: ABNORMAL
KETONES UR STRIP-MCNC: NEGATIVE MG/DL
LEUKOCYTE ESTERASE UR QL STRIP: ABNORMAL
NITRATE UR QL: NEGATIVE
PH UR STRIP: 7 PH (ref 4.7–8)
RBC #/AREA URNS AUTO: 25 /HPF (ref 0–2)
SOURCE: ABNORMAL
SP GR UR STRIP: 1.01 (ref 1–1.03)
SQUAMOUS #/AREA URNS AUTO: 4 /HPF (ref 0–1)
UROBILINOGEN UR STRIP-MCNC: NORMAL MG/DL (ref 0–2)
WBC #/AREA URNS AUTO: 1 /HPF (ref 0–5)

## 2018-09-12 PROCEDURE — 86694 HERPES SIMPLEX NES ANTBDY: CPT | Performed by: NURSE PRACTITIONER

## 2018-09-12 PROCEDURE — 99214 OFFICE O/P EST MOD 30 MIN: CPT | Performed by: NURSE PRACTITIONER

## 2018-09-12 PROCEDURE — 86695 HERPES SIMPLEX TYPE 1 TEST: CPT | Performed by: NURSE PRACTITIONER

## 2018-09-12 PROCEDURE — 81001 URINALYSIS AUTO W/SCOPE: CPT | Performed by: NURSE PRACTITIONER

## 2018-09-12 PROCEDURE — 36415 COLL VENOUS BLD VENIPUNCTURE: CPT | Performed by: NURSE PRACTITIONER

## 2018-09-12 PROCEDURE — G0463 HOSPITAL OUTPT CLINIC VISIT: HCPCS

## 2018-09-12 PROCEDURE — 86696 HERPES SIMPLEX TYPE 2 TEST: CPT | Performed by: NURSE PRACTITIONER

## 2018-09-12 RX ORDER — VALACYCLOVIR HYDROCHLORIDE 1 G/1
TABLET, FILM COATED ORAL
Qty: 20 TABLET | Refills: 0 | Status: SHIPPED | OUTPATIENT
Start: 2018-09-12 | End: 2018-10-05

## 2018-09-12 RX ORDER — CIPROFLOXACIN 500 MG/1
500 TABLET, FILM COATED ORAL 2 TIMES DAILY
Qty: 14 TABLET | Refills: 0 | Status: SHIPPED | OUTPATIENT
Start: 2018-09-12 | End: 2018-10-05

## 2018-09-12 ASSESSMENT — ENCOUNTER SYMPTOMS
ABDOMINAL PAIN: 0
COUGH: 0
DIZZINESS: 0
FATIGUE: 0
NAUSEA: 0
BACK PAIN: 1
VOMITING: 0
HEADACHES: 0
FREQUENCY: 1
DYSURIA: 1
DIARRHEA: 0
SHORTNESS OF BREATH: 0
FEVER: 0

## 2018-09-12 NOTE — DISCHARGE INSTRUCTIONS
1. Cipro 500mg  2 times a day for 7 days  2. Drink plenty of water  3.  Will check Herpes 1&2 IGM( have it now) IGG( have been exposed or had it in past.)    4. Lotrimin(can get over the counter)   to labia is soothing for sores.    5. Valtrex 1000mg  2 times a day for 10 days.

## 2018-09-12 NOTE — ED TRIAGE NOTES
Pt presents with urgency, frequency,burning with urination for 2 days. States that she has an irritated area to her vaginal area, lower back pain yesterday, pressure to her lower abdomen.

## 2018-09-12 NOTE — ED AVS SNAPSHOT
[facility]    [address] [place] [address]    Phone:  [phone]                                       Valorie Awad   MRN: [id number]    Department:  [facility]   Date of Visit:  9/12/2018           Patient Information     Date Of Birth          1995        Your diagnoses for this visit were:     Genital sore     Dysuria        You were seen by Marco A Wooten, NP.      Follow-up Information     Follow up with Bisi Muro MD.    Specialty:  Family Practice    Contact information:    [address] [place] [address] [phone]          Discharge Instructions       1. Cipro 500mg  2 times a day for 7 days  2. Drink plenty of water  3.  Will check Herpes 1&2 IGM( have it now) IGG( have been exposed or had it in past.)    4. Lotrimin(can get over the counter)   to labia is soothing for sores.    5. Valtrex 1000mg  2 times a day for 10 days.      Discharge References/Attachments     HERPES VIRUS, THE (ENGLISH)    HERPES, DIAGNOSING (ENGLISH)    HERPES, LIVING WITH (ENGLISH)      Your next 10 appointments already scheduled     Sep 28, 2018 11:00 AM CDT   Return Visit with Toan Chavez MD   [facility] ([facility])    [address]  [facility] [address] [phone]                 Review of your medicines      START taking        Dose / Directions Last dose taken    ciprofloxacin 500 MG tablet   Commonly known as:  CIPRO   Dose:  500 mg   Quantity:  14 tablet        Take 1 tablet (500 mg) by mouth 2 times daily   Refills:  0        valACYclovir 1000 mg tablet   Commonly known as:  VALTREX   Quantity:  20 tablet        Take 1 tab 2 times a day for 10 days.   Refills:  0          Our records show that you are taking the medicines listed below. If these are incorrect, please call your family doctor or clinic.        Dose / Directions Last dose taken    clonazePAM 2 MG tablet   Commonly known as:   klonoPIN   Quantity:  30 tablet        TAKE ONE TABLET BY MOUTH EVERY DAY AS NEEDED FOR ANXIETY   Refills:  3        escitalopram 10 MG tablet   Commonly known as:  LEXAPRO   Quantity:  30 tablet        Take 1/2 tablet daily for 1 week then increase to 1 tablet daily   Refills:  3        ISIBLOOM PO        Take by mouth daily   Refills:  0        nabumetone 500 MG tablet   Commonly known as:  RELAFEN   Dose:  500-1000 mg   Quantity:  60 tablet        Take 1-2 tablets (500-1,000 mg) by mouth 2 times daily as needed for moderate pain   Refills:  1        SUMAtriptan 100 MG tablet   Commonly known as:  IMITREX   Dose:  100 mg   Quantity:  9 tablet        Take 1 tablet (100 mg) by mouth at onset of headache for migraine May repeat in 2 hours if needed: max 2/day; average number of headaches monthly   Refills:  3        topiramate 25 MG tablet   Commonly known as:  TOPAMAX   Quantity:  60 tablet        TAKE 1 TABLET BY MOUTH EVERY EVENING FOR 1 WEEK, THEN INCREASE TO 1 TABLET BY MOUTH TWICE DAILY   Refills:  1        TYLENOL PO   Dose:  1000 mg        Take 1,000 mg by mouth every 6 hours as needed for mild pain or fever   Refills:  0                Prescriptions were sent or printed at these locations (2 Prescriptions)                   [facility] - [place], [place] - [address] AT [facility] [address]   [address] [place] [address]    Telephone:  [phone]   Fax:  [phone]   Hours:                  E-Prescribed (2 of 2)         ciprofloxacin (CIPRO) 500 MG tablet               valACYclovir (VALTREX) 1000 mg tablet                Procedures and tests performed during your visit     HSV IgM antibody    Herpes Simplex Virus 1 and 2 IgG    UA reflex to Microscopic and Culture      Orders Needing Specimen Collection     None      Pending Results     Date and Time Order Name Status Description    9/12/2018 1755 HSV IgM antibody In process     9/12/2018 1755 Herpes Simplex Virus 1 and 2 IgG In  "process             Pending Culture Results     No orders found from 9/10/2018 to 2018.            Thank you for choosing [facility]       Thank you for choosing [facility] for your care. Our goal is always to provide you with excellent care. Hearing back from our patients is one way we can continue to improve our services. Please take a few minutes to complete the written survey that you may receive in the mail after you visit with us. Thank you!        [employer] Information     [employer] lets you send messages to your doctor, view your test results, renew your prescriptions, schedule appointments and more. To sign up, go to [email or web address] . Click on \"Log in\" on the left side of the screen, which will take you to the Welcome page. Then click on \"Sign up Now\" on the right side of the page.     You will be asked to enter the access code listed below, as well as some personal information. Please follow the directions to create your username and password.     Your access code is: [email or web address]  Expires: 2018  6:03 PM     Your access code will  in 90 days. If you need help or a new code, please call your [facility] clinic or [phone].        Care EveryWhere ID     This is your Care EveryWhere ID. This could be used by other organizations to access your [facility] medical records  [id number]        Equal Access to Services     MYLENE STROUD : Titus merchant Sonomi, waaxda luqadaha, qaybta kaalmalauri posada, rafita barriga. So [facility] [phone].    ATENCIÓN: Si habla español, tiene a yao disposición servicios gratuitos de asistencia lingüística. Llame al [phone].    We comply with applicable federal civil rights laws and [place] laws. We do not discriminate on the basis of race, color, national origin, age, disability, sex, sexual orientation, or gender identity.            After Visit Summary       This is your record. Keep this with you and show to your " community pharmacist(s) and doctor(s) at your next visit.

## 2018-09-12 NOTE — ED AVS SNAPSHOT
[facility]    [address] [address]    Phone:  [phone]                                       Valorie Awad   MRN: [id number]    Department[facility]  [facility]   Date of Visit:  9/12/2018           After Visit Summary Signature Page     I have received my discharge instructions, and my questions have been answered. I have discussed any challenges I see with this plan with the nurse or doctor.    ..........................................................................................................................................  Patient/Patient Representative Signature      ..........................................................................................................................................  Patient Representative Print Name and Relationship to Patient    ..................................................               ................................................  Date                                   Time    ..........................................................................................................................................  Reviewed by Signature/Title    ...................................................              ..............................................  Date                                               Time          22EPIC Rev 08/18

## 2018-09-13 NOTE — ED PROVIDER NOTES
History     Chief Complaint   Patient presents with     Abdominal Pain     c/o abd and back pain, concerned about possible uti     HPI  Valorie Awad is a 23 year old female who has had urgency, burning, frequency, and pelvic pressure for 2 days.Has back pain.  No fevers,   Hx UTI's and bladder surgery.    Also has had a sore to inner labia on left, feels very irritated? If from cleaning.   With the UTI. No hx of blisters.  Has same partner  for 3 years.  Has had these lesions on and off since being with this partner.    Problem List:    Patient Active Problem List    Diagnosis Date Noted     ACP (advance care planning) 03/22/2017     Priority: Medium     Advance Care Planning 7/6/2017: ACP Review of Chart / Resources Provided:  Reviewed chart for advance care plan.  Valorie Awad has no plan or code status on file. Discussed available resources and provided with information.   Added by KARLY CORBETT                 Spasm of muscle 08/12/2015     Priority: Medium     Migraine 10/10/2007     Priority: Medium     Problem list name updated by automated process. Provider to review       Anxiety state 09/20/2001     Priority: Medium     Problem list name updated by automated process. Provider to review          Past Medical History:    Past Medical History:   Diagnosis Date     Abnormal Pap smear      Anxiety 9/20/2001     Marijuana use      Migraine headaches 10/10/2007     Supervision of other normal pregnancy        Past Surgical History:    Past Surgical History:   Procedure Laterality Date     Comminuted fracture elbow Left 10/25/2013    lauren placed     CYSTOSCOPY      bladder distention     TONSILLECTOMY         Family History:    Family History   Problem Relation Age of Onset     Other - See Comments Father      alcoholism     Depression Father      Mental Illness Father      Depression Mother      Irritable Bowel Syndrome Mother      Mental Illness Mother      Other - See Comments Mother      migraine  headache     Other - See Comments Maternal Grandmother      blood disease     Lipids Maternal Grandmother      hyperlipidemia     Thyroid Disease Maternal Grandmother      Diabetes No family hx of      Asthma No family hx of        Social History:  Marital Status:  Single [1]  Social History   Substance Use Topics     Smoking status: Never Smoker     Smokeless tobacco: Never Used     Alcohol use 0.0 oz/week     0 Standard drinks or equivalent per week      Comment: social        Medications:      Acetaminophen (TYLENOL PO)   ciprofloxacin (CIPRO) 500 MG tablet   clonazePAM (KLONOPIN) 2 MG tablet   Desogestrel-Ethinyl Estradiol (ISIBLOOM PO)   nabumetone (RELAFEN) 500 MG tablet   topiramate (TOPAMAX) 25 MG tablet   valACYclovir (VALTREX) 1000 mg tablet   escitalopram (LEXAPRO) 10 MG tablet   SUMAtriptan (IMITREX) 100 MG tablet         Review of Systems   Constitutional: Negative for fatigue and fever.   HENT: Negative.    Respiratory: Negative for cough and shortness of breath.    Gastrointestinal: Negative for abdominal pain, diarrhea, nausea and vomiting.   Genitourinary: Positive for dysuria, frequency, genital sores, pelvic pain and urgency. Negative for vaginal discharge.        Left Labial  Sore ? If from wiping with possible UTI.     Musculoskeletal: Positive for back pain.   Neurological: Negative for dizziness and headaches.   Psychiatric/Behavioral:        Crying at prospect of having Herpes.         Physical Exam   BP: 122/81  Heart Rate: 98  Temp: 97.1  F (36.2  C)  Resp: 14  SpO2: 100 %      Physical Exam   Constitutional: She is oriented to person, place, and time. She appears well-developed and well-nourished. She appears distressed.   Eyes: Conjunctivae and EOM are normal. Pupils are equal, round, and reactive to light.   Neck: Normal range of motion. Neck supple. No thyromegaly present.   Cardiovascular: Normal rate, regular rhythm and normal heart sounds.    No murmur heard.  Pulmonary/Chest: Effort  normal and breath sounds normal.   Abdominal:   Negative CVA tenderness      Genitourinary:   Genitourinary Comments: Has 4-5mm ulcerated sore with red base to inner left labia.     Lymphadenopathy:     She has no cervical adenopathy.   Neurological: She is alert and oriented to person, place, and time.   Skin: Skin is warm and dry.   Sore to left labia as above.     Psychiatric: She has a normal mood and affect.   Crying.         ED Course     ED Course     Procedures    Herpes 1&2 IGG, IGM drawn.           Results for orders placed or performed during the hospital encounter of 09/12/18 (from the past 24 hour(s))   UA reflex to Microscopic and Culture   Result Value Ref Range    Color Urine Light Yellow     Appearance Urine Clear     Glucose Urine Negative NEG^Negative mg/dL    Bilirubin Urine Negative NEG^Negative    Ketones Urine Negative NEG^Negative mg/dL    Specific Gravity Urine 1.006 1.003 - 1.035    Blood Urine Moderate (A) NEG^Negative    pH Urine 7.0 4.7 - 8.0 pH    Protein Albumin Urine Negative NEG^Negative mg/dL    Urobilinogen mg/dL Normal 0.0 - 2.0 mg/dL    Nitrite Urine Negative NEG^Negative    Leukocyte Esterase Urine Trace (A) NEG^Negative    Source Midstream Urine     RBC Urine 25 (H) 0 - 2 /HPF    WBC Urine 1 0 - 5 /HPF    Bacteria Urine None (A) NEG^Negative /HPF    Squamous Epithelial /HPF Urine 4 (H) 0 - 1 /HPF       Medications - No data to display    Assessments & Plan (with Medical Decision Making)     I have reviewed the nursing notes.    I have reviewed the findings, diagnosis, plan and need for follow up with the patient. Discussed Herpes,   Discussed sore on Left labia does look like Herpes.  It is a shallow ulcerated sore. She staes has had these before -come and go.   Patient crying. States is scared.  Usure if truly Herpes. And explained this to patient.   Will check IGM, IGG.  Discussed findings of RBC in Urine Not WBC-but will treat anyway incase early UTI.       Discharge  Medication List as of 9/12/2018  6:04 PM      START taking these medications    Details   ciprofloxacin (CIPRO) 500 MG tablet Take 1 tablet (500 mg) by mouth 2 times daily, Disp-14 tablet, R-0, E-Prescribe      valACYclovir (VALTREX) 1000 mg tablet Take 1 tab 2 times a day for 10 days., Disp-20 tablet, R-0, E-Prescribe             Final diagnoses:   Genital sore   Dysuria     ASSESSMENT / PLAN:  (R39.89) Genital sore  Comment:   Plan:   1. Herpes 1&2 IGG, IGM drawn  2.  Valtrex 1000mg  2 times a day for 10 days  3.  Lotrimin cream can sooth-but does not have fungal infection.     (R30.0) Dysuria  Comment: Has UTI    Plan:   1. Cipro 500mg BID for 7 days  2. Drink plenty fluid.        9/12/2018   [facility]     Marco A Wooten NP  09/12/18 1929       Marco A Wooten NP  09/12/18 1936       Marco A Wooten NP  09/12/18 2100

## 2018-09-14 LAB
HSV1 IGG SERPL QL IA: >8 AI (ref 0–0.8)
HSV2 IGG SERPL QL IA: 4.2 AI (ref 0–0.8)

## 2018-09-14 NOTE — ED NOTES
Pt called asking about results, none were completed yet from lab. States she will call again later today.

## 2018-09-19 LAB — HSV 1+2 IGM SER-IMP: 0.7 INDEX VALUE (ref 0–0.89)

## 2018-09-28 ENCOUNTER — HOSPITAL ENCOUNTER (OUTPATIENT)
Dept: GENERAL RADIOLOGY | Facility: OTHER | Age: 23
Discharge: HOME | End: 2018-09-28
Attending: SPECIALIST | Admitting: SPECIALIST
Payer: COMMERCIAL

## 2018-09-28 ENCOUNTER — TRANSFERRED RECORDS (OUTPATIENT)
Dept: HEALTH INFORMATION MANAGEMENT | Facility: CLINIC | Age: 23
End: 2018-09-28

## 2018-09-28 ENCOUNTER — OFFICE VISIT (OUTPATIENT)
Dept: ORTHOPEDICS | Facility: OTHER | Age: 23
End: 2018-09-28
Attending: SPECIALIST
Payer: COMMERCIAL

## 2018-09-28 DIAGNOSIS — M25.522 LEFT ELBOW PAIN: ICD-10-CM

## 2018-09-28 DIAGNOSIS — M25.522 LEFT ELBOW PAIN: Primary | ICD-10-CM

## 2018-09-28 PROCEDURE — 73080 X-RAY EXAM OF ELBOW: CPT | Mod: LT

## 2018-09-28 PROCEDURE — G0463 HOSPITAL OUTPT CLINIC VISIT: HCPCS | Mod: 25 | Performed by: SPECIALIST

## 2018-09-28 NOTE — MR AVS SNAPSHOT
"              After Visit Summary   9/28/2018    Valorie Awad    MRN: [id number]           Patient Information     Date Of Birth          1995        Visit Information        Provider Department      9/28/2018 11:00 AM Toan Chavez MD [facility]        Today's Diagnoses     Left elbow pain    -  1       Follow-ups after your visit        Your next 10 appointments already scheduled     Oct 18, 2018  2:15 PM CDT   (Arrive by 2:00 PM)   [facility]uro [facility]   [facility]    [address]  [place] [address]   [phone]              Who to contact     If you have questions or need follow up information about today's clinic visit or your schedule please contact [facility] directly at [phone].  Normal or non-critical lab and imaging results will be communicated to you by MyChart, letter or phone within 4 business days after the clinic has received the results. If you do not hear from us within 7 days, please contact the clinic through MyChart or phone. If you have a critical or abnormal lab result, we will notify you by phone as soon as possible.  Submit refill requests through [employer] or call your pharmacy and they will forward the refill request to us. Please allow 3 business days for your refill to be completed.          Additional Information About Your Visit        MyChart Information     [employer] lets you send messages to your doctor, view your test results, renew your prescriptions, schedule appointments and more. To sign up, go to [email or web address] . Click on \"Log in\" on the left side of the screen, which will take you to the Welcome page. Then click on \"Sign up Now\" on the right side of the page.     You will be asked to enter the access code listed below, as well as some personal information. Please follow the directions to create your username and password.     Your access " code is: [email or web address]  Expires: 2018  6:03 PM     Your access code will  in 90 days. If you need help or a new code, please call your [facility] clinic or [phone].        Care EveryWhere ID     This is your Care EveryWhere ID. This could be used by other organizations to access your [facility] medical records  [id number]         Blood Pressure from Last 3 Encounters:   10/09/18 108/66   10/05/18 126/74   18 122/81    Weight from Last 3 Encounters:   10/09/18 88.9 kg (196 lb)   03/15/18 93 kg (205 lb)   18 91.6 kg (202 lb)               Primary Care Provider Office Phone # Fax #    Bisi Muro [phone]       [facility] [address]        Equal Access to Services     RAÚL [facility] Center[facility] : Hadii aad ku hadasho Soomaali, waaxda luqadaha, qaybta kaalmada adeegyada, waxay wilfridin hayaan hamilton larkin . So [facility] [phone].    ATENCIÓN: Si habla español, tiene a yao disposición servicios gratuitos de asistencia lingüística. Caterina al [phone].    We comply with applicable federal civil rights laws and [place] laws. We do not discriminate on the basis of race, color, national origin, age, disability, sex, sexual orientation, or gender identity.            Thank you!     Thank you for choosing [facility] AND [facility]  for your care. Our goal is always to provide you with excellent care. Hearing back from our patients is one way we can continue to improve our services. Please take a few minutes to complete the written survey that you may receive in the mail after your visit with us. Thank you!             Your Updated Medication List - Protect others around you: Learn how to safely use, store and throw away your medicines at www.disposemymeds.org.          This list is accurate as of 18 11:59 PM.  Always use your most recent med list.                   Brand Name Dispense Instructions for use Diagnosis    clonazePAM 2 MG tablet    klonoPIN     30 tablet    TAKE ONE TABLET BY MOUTH EVERY DAY AS NEEDED FOR ANXIETY    Anxiety       ISIBLOOM PO      Take by mouth daily        nabumetone 500 MG tablet    RELAFEN    60 tablet    Take 1-2 tablets (500-1,000 mg) by mouth 2 times daily as needed for moderate pain    Acute right-sided low back pain with right-sided sciatica       SUMAtriptan 100 MG tablet    IMITREX    9 tablet    Take 1 tablet (100 mg) by mouth at onset of headache for migraine May repeat in 2 hours if needed: max 2/day; average number of headaches monthly    Intractable chronic migraine without aura and without status migrainosus       TYLENOL PO      Take 1,000 mg by mouth every 6 hours as needed for mild pain or fever

## 2018-10-05 ENCOUNTER — OFFICE VISIT (OUTPATIENT)
Dept: PSYCHIATRY | Facility: OTHER | Age: 23
End: 2018-10-05
Attending: PSYCHIATRY & NEUROLOGY
Payer: COMMERCIAL

## 2018-10-05 VITALS — DIASTOLIC BLOOD PRESSURE: 74 MMHG | SYSTOLIC BLOOD PRESSURE: 126 MMHG | HEART RATE: 100 BPM | TEMPERATURE: 98.8 F

## 2018-10-05 DIAGNOSIS — F41.1 GAD (GENERALIZED ANXIETY DISORDER): Primary | ICD-10-CM

## 2018-10-05 DIAGNOSIS — G43.719 INTRACTABLE CHRONIC MIGRAINE WITHOUT AURA AND WITHOUT STATUS MIGRAINOSUS: ICD-10-CM

## 2018-10-05 PROCEDURE — G0463 HOSPITAL OUTPT CLINIC VISIT: HCPCS

## 2018-10-05 PROCEDURE — 99213 OFFICE O/P EST LOW 20 MIN: CPT | Performed by: PSYCHIATRY & NEUROLOGY

## 2018-10-05 RX ORDER — TOPIRAMATE 25 MG/1
TABLET, FILM COATED ORAL
Qty: 60 TABLET | Refills: 3 | Status: SHIPPED | OUTPATIENT
Start: 2018-10-05 | End: 2018-11-20

## 2018-10-05 RX ORDER — PROPRANOLOL HYDROCHLORIDE 10 MG/1
10 TABLET ORAL 2 TIMES DAILY
Qty: 60 TABLET | Refills: 3 | Status: SHIPPED | OUTPATIENT
Start: 2018-10-05 | End: 2018-11-20

## 2018-10-05 RX ORDER — HYDROXYZINE HYDROCHLORIDE 10 MG/1
10 TABLET, FILM COATED ORAL 3 TIMES DAILY PRN
Qty: 60 TABLET | Refills: 3 | Status: SHIPPED | OUTPATIENT
Start: 2018-10-05 | End: 2018-11-20 | Stop reason: DRUGHIGH

## 2018-10-05 ASSESSMENT — PAIN SCALES - GENERAL: PAINLEVEL: NO PAIN (0)

## 2018-10-05 ASSESSMENT — ANXIETY QUESTIONNAIRES
IF YOU CHECKED OFF ANY PROBLEMS ON THIS QUESTIONNAIRE, HOW DIFFICULT HAVE THESE PROBLEMS MADE IT FOR YOU TO DO YOUR WORK, TAKE CARE OF THINGS AT HOME, OR GET ALONG WITH OTHER PEOPLE: EXTREMELY DIFFICULT
2. NOT BEING ABLE TO STOP OR CONTROL WORRYING: NEARLY EVERY DAY
5. BEING SO RESTLESS THAT IT IS HARD TO SIT STILL: SEVERAL DAYS
6. BECOMING EASILY ANNOYED OR IRRITABLE: NEARLY EVERY DAY
GAD7 TOTAL SCORE: 18
7. FEELING AFRAID AS IF SOMETHING AWFUL MIGHT HAPPEN: MORE THAN HALF THE DAYS
3. WORRYING TOO MUCH ABOUT DIFFERENT THINGS: NEARLY EVERY DAY
1. FEELING NERVOUS, ANXIOUS, OR ON EDGE: NEARLY EVERY DAY

## 2018-10-05 ASSESSMENT — PATIENT HEALTH QUESTIONNAIRE - PHQ9: 5. POOR APPETITE OR OVEREATING: NEARLY EVERY DAY

## 2018-10-05 NOTE — NURSING NOTE
"Chief Complaint   Patient presents with     RECHECK     Mental health.  Patient c/o increased anxiety.  Stopped taking Venlafaxine.       Initial /74 (BP Location: Left arm, Patient Position: Sitting, Cuff Size: Adult Regular)  Pulse 100  Temp 98.8  F (37.1  C) (Tympanic) Estimated body mass index is 33.09 kg/(m^2) as calculated from the following:    Height as of 7/11/17: 5' 6\" (1.676 m).    Weight as of 3/15/18: 205 lb (93 kg).  Medication Reconciliation: complete    PATY RYAN LPN    "

## 2018-10-05 NOTE — MR AVS SNAPSHOT
"              After Visit Summary   10/5/2018    Valorie Awad    MRN: [id number]           Patient Information     Date Of Birth          1995        Visit Information        Provider Department      10/5/2018 8:40 AM Jyoti Leahy MD [facility]        Today's Diagnoses     NITZA (generalized anxiety disorder)    -  1    Intractable chronic migraine without aura and without status migrainosus           Follow-ups after your visit        Your next 10 appointments already scheduled     Oct 18, 2018  2:15 PM CDT   (Arrive by 2:00 PM)   SHORT with Bisi Muro MD   [facility]    [address]  [facility] [address]   [phone]              Who to contact     If you have questions or need follow up information about today's clinic visit or your schedule please contact [facility] directly at [phone].  Normal or non-critical lab and imaging results will be communicated to you by MyChart, letter or phone within 4 business days after the clinic has received the results. If you do not hear from us within 7 days, please contact the clinic through MyChart or phone. If you have a critical or abnormal lab result, we will notify you by phone as soon as possible.  Submit refill requests through [employer] or call your pharmacy and they will forward the refill request to us. Please allow 3 business days for your refill to be completed.          Additional Information About Your Visit        [employer] Information     [employer] lets you send messages to your doctor, view your test results, renew your prescriptions, schedule appointments and more. To sign up, go to [email or web address] . Click on \"Log in\" on the left side of the screen, which will take you to the Welcome page. Then click on \"Sign up Now\" on the right side of the page.     You will be asked to enter the access code listed below, as well as some personal " information. Please follow the directions to create your username and password.     Your access code is: [email or web address]  Expires: 2018  6:03 PM     Your access code will  in 90 days. If you need help or a new code, please call your [facility] clinic or [phone].        Care EveryWhere ID     This is your Care EveryWhere ID. This could be used by other organizations to access your [facility] medical records  [id number]        Your Vitals Were     Pulse Temperature                100 98.8  F (37.1  C) (Tympanic)           Blood Pressure from Last 3 Encounters:   10/05/18 126/74   18 122/81   18 115/73    Weight from Last 3 Encounters:   03/15/18 205 lb (93 kg)   18 202 lb (91.6 kg)   18 202 lb (91.6 kg)              Today, you had the following     No orders found for display         Today's Medication Changes          These changes are accurate as of 10/5/18  9:16 AM.  If you have any questions, ask your nurse or doctor.               Start taking these medicines.        Dose/Directions    hydrOXYzine 10 MG tablet   Commonly known as:  ATARAX   Used for:  NITZA (generalized anxiety disorder)   Started by:  Jyoti Leahy MD        Dose:  10 mg   Take 1 tablet (10 mg) by mouth 3 times daily as needed for anxiety   Quantity:  60 tablet   Refills:  3       propranolol 10 MG tablet   Commonly known as:  INDERAL   Used for:  NITZA (generalized anxiety disorder)   Started by:  Jyoti Leahy MD        Dose:  10 mg   Take 1 tablet (10 mg) by mouth 2 times daily   Quantity:  60 tablet   Refills:  3            Where to get your medicines      These medications were sent to [id number] - [place] - [address] [facility]  [address] [place] [address]     Phone:  [phone]     hydrOXYzine 10 MG tablet    propranolol 10 MG tablet    topiramate 25 MG tablet                Primary Care Provider Office Phone # Fax #    Bisi Muro [phone]  [phone]       [address] [facility] [address]        Equal Access to Services     MYLENE STROUD : Hadii dilip espinoza hadpartho Sonomi, waaxda luqadaha, qaybta kaalmada albino, rafita wilfridin hayaataya rearadha berry trae barriga. So [facility] [phone].    ATENCIÓN: Si habla español, tiene a yao disposición servicios gratuitos de asistencia lingüística. Caterina al [phone].    We comply with applicable federal civil rights laws and [place] laws. We do not discriminate on the basis of race, color, national origin, age, disability, sex, sexual orientation, or gender identity.            Thank you!     Thank you for choosing [facility]  for your care. Our goal is always to provide you with excellent care. Hearing back from our patients is one way we can continue to improve our services. Please take a few minutes to complete the written survey that you may receive in the mail after your visit with us. Thank you!             Your Updated Medication List - Protect others around you: Learn how to safely use, store and throw away your medicines at www.disposemymeds.org.          This list is accurate as of 10/5/18  9:16 AM.  Always use your most recent med list.                   Brand Name Dispense Instructions for use Diagnosis    clonazePAM 2 MG tablet    klonoPIN    30 tablet    TAKE ONE TABLET BY MOUTH EVERY DAY AS NEEDED FOR ANXIETY    Anxiety       hydrOXYzine 10 MG tablet    ATARAX    60 tablet    Take 1 tablet (10 mg) by mouth 3 times daily as needed for anxiety    NITZA (generalized anxiety disorder)       ISIBLOOM PO      Take by mouth daily        nabumetone 500 MG tablet    RELAFEN    60 tablet    Take 1-2 tablets (500-1,000 mg) by mouth 2 times daily as needed for moderate pain    Acute right-sided low back pain with right-sided sciatica       propranolol 10 MG tablet    INDERAL    60 tablet    Take 1 tablet (10 mg) by mouth 2 times daily    NITZA (generalized anxiety disorder)        SUMAtriptan 100 MG tablet    IMITREX    9 tablet    Take 1 tablet (100 mg) by mouth at onset of headache for migraine May repeat in 2 hours if needed: max 2/day; average number of headaches monthly    Intractable chronic migraine without aura and without status migrainosus       topiramate 25 MG tablet    TOPAMAX    60 tablet    TAKE 1 TABLET BY MOUTH EVERY EVENING FOR 1 WEEK, THEN INCREASE TO 1 TABLET BY MOUTH TWICE DAILY    Intractable chronic migraine without aura and without status migrainosus       TYLENOL PO      Take 1,000 mg by mouth every 6 hours as needed for mild pain or fever

## 2018-10-05 NOTE — PROGRESS NOTES
PSYCHIATRY CLINIC PROGRESS NOTE   20 minute medication management, more than 50% of time spent counseling patient on medications, medication side effects, symptom history and management   SUBJECTIVE / INTERIM HISTORY                                                                       The pt was last seen in clinic January 2018:   Children-  Tranicolasaion is 5 yo.  Last visit: January 2018: : we will continue Klonopin as prn. Going to start venlafaxine 37.5 mg daily for one week then increase to 75 mg daily     -- didn't feel Effexor helped any so stopped it  -- morning is the worse for her    - anxiety has been bad -- more bad days unfortunately than good. In past week for example feels had maybe one good day. Sleeps a lot  -- taking Klonopin as prn. 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 Feels mood is stable - doesn't feel depressed at this time.  MEDICAL ROS-  Hip / sciatic nerve pain, Migraines have been worse, no GI issues  MEDICAL / SURGICAL HISTORY                pregnant [if applicable]--no   Medical Team:     PMD- Dr. Jo Muro    Therapist- [facility]   Patient Active Problem List   Diagnosis     Anxiety state     Migraine     Spasm of muscle     ACP (advance care planning)     ALLERGY   Bactrim [sulfamethoxazole w-trimethoprim]; Morphine hcl; Clindamycin; Nitrofurantoin; Nitrofurantoin monohydrate macrocrystals; and Loracarbef  MEDICATIONS                                                                                             Current Outpatient Prescriptions   Medication Sig     Acetaminophen (TYLENOL PO) Take 1,000 mg by mouth every 6 hours as needed for  mild pain or fever     clonazePAM (KLONOPIN) 2 MG tablet TAKE ONE TABLET BY MOUTH EVERY DAY AS NEEDED FOR ANXIETY     Desogestrel-Ethinyl Estradiol (ISIBLOOM PO) Take by mouth daily     nabumetone (RELAFEN) 500 MG tablet Take 1-2 tablets (500-1,000 mg) by mouth 2 times daily as needed for moderate pain     valACYclovir (VALTREX) 1000 mg tablet Take 1 tab 2 times a day for 10 days.     SUMAtriptan (IMITREX) 100 MG tablet Take 1 tablet (100 mg) by mouth at onset of headache for migraine May repeat in 2 hours if needed: max 2/day; average number of headaches monthly (Patient not taking: Reported on 10/5/2018)     topiramate (TOPAMAX) 25 MG tablet TAKE 1 TABLET BY MOUTH EVERY EVENING FOR 1 WEEK, THEN INCREASE TO 1 TABLET BY MOUTH TWICE DAILY (Patient not taking: Reported on 10/5/2018)     No current facility-administered medications for this visit.        VITALS   /74 (BP Location: Left arm, Patient Position: Sitting, Cuff Size: Adult Regular)  Pulse 100  Temp 98.8  F (37.1  C) (Tympanic)     PHQ9                       PHQ-9 SCORE 1/19/2018 3/15/2018 10/5/2018   Total Score - - -   Total Score 8 7 16       MENTAL STATUS EXAM                                                                                        Alert. Oriented to person, place, and date / time. Well groomed, calm, cooperative with good eye contact. No problems with speech or psychomotor behavior. Mood was described as anxious and affect was congruent to speech content and full range. Thought process, including associations, was unremarkable and thought content was devoid of suicidal and homicidal ideation and psychotic thought. No hallucinations. Insight was good. Judgment was intact and adequate for safety. Fund of knowledge was intact. Pt demonstrates no obvious problems with attention, concentration, language, recent or remote memory although these were not formally tested.     ASSESSMENT                                                                                                       HISTORICAL:  Initial psych note 2/13/15       Notes: Buspar ineffective. Zoloft. Effexor up to 75 : ineffective and made her feel foggy.  she stopped it. Paxil ineffective. Gabapentin : sedation  CURRENT:  This patient provides a history which supports the diagnoses of panic disorder without agoraphobia  and MDD, recurrent, mild to moderate and NITZA. Anxiety has been bad, excessive worrying even when everything is fine.  Only meds is KLonopin prn : she feels needs something daily. We are going to have her try effexor and we reviewed potential SEs.    TREATMENT RISK STATEMENT:  The risks, benefits, alternatives and potential adverse effects have been explained and are understood by the pt.  The pt agrees to the treatment plan with the ability to do so.  Discussion of specific concerns included- potential SEs meds.  The pt knows to call the clinic for any problems or access emergency care if needed.   There are no medical considerations relevant to treatment, as noted above.  Substance use is not a problem as noted above.         DIAGNOSES               Panic disorder without agoraphobia  Major depressive disorder, recurrent, mild to mod  NITZA      PLAN                                                                                                                           1) MEDICATIONS: we will continue Klonopin as prn. Will re-start her Topamax. Propranolol 10 mg twice daily. Going to try hydroxyzine 10 mg up to 3 times daily as needed       2) THERAPY: No Change. She was working with a therapist at [facility]    3) LABS: None    4) PT MONITOR [call for probs]: worsening sx, side effects, if develops SI    5) REFERRALS [CD tx, medical, tests other]: None    6)  RTC: ~she will call to schedule

## 2018-10-06 ASSESSMENT — PATIENT HEALTH QUESTIONNAIRE - PHQ9: SUM OF ALL RESPONSES TO PHQ QUESTIONS 1-9: 16

## 2018-10-06 ASSESSMENT — ANXIETY QUESTIONNAIRES: GAD7 TOTAL SCORE: 18

## 2018-10-09 ENCOUNTER — TELEPHONE (OUTPATIENT)
Dept: FAMILY MEDICINE | Facility: OTHER | Age: 23
End: 2018-10-09

## 2018-10-09 ENCOUNTER — OFFICE VISIT (OUTPATIENT)
Dept: FAMILY MEDICINE | Facility: OTHER | Age: 23
End: 2018-10-09
Attending: PHYSICIAN ASSISTANT
Payer: COMMERCIAL

## 2018-10-09 VITALS
HEIGHT: 66 IN | DIASTOLIC BLOOD PRESSURE: 66 MMHG | HEART RATE: 83 BPM | BODY MASS INDEX: 31.5 KG/M2 | WEIGHT: 196 LBS | TEMPERATURE: 98.2 F | SYSTOLIC BLOOD PRESSURE: 108 MMHG | OXYGEN SATURATION: 99 %

## 2018-10-09 DIAGNOSIS — K12.0 APHTHOUS ULCER: Primary | ICD-10-CM

## 2018-10-09 PROCEDURE — 99213 OFFICE O/P EST LOW 20 MIN: CPT | Performed by: PHYSICIAN ASSISTANT

## 2018-10-09 PROCEDURE — G0463 HOSPITAL OUTPT CLINIC VISIT: HCPCS

## 2018-10-09 RX ORDER — TRIAMCINOLONE ACETONIDE 0.1 %
PASTE (GRAM) DENTAL 3 TIMES DAILY
Qty: 5 G | Refills: 0 | Status: SHIPPED | OUTPATIENT
Start: 2018-10-09 | End: 2019-03-19

## 2018-10-09 ASSESSMENT — ANXIETY QUESTIONNAIRES
GAD7 TOTAL SCORE: 18
5. BEING SO RESTLESS THAT IT IS HARD TO SIT STILL: SEVERAL DAYS
6. BECOMING EASILY ANNOYED OR IRRITABLE: NEARLY EVERY DAY
IF YOU CHECKED OFF ANY PROBLEMS ON THIS QUESTIONNAIRE, HOW DIFFICULT HAVE THESE PROBLEMS MADE IT FOR YOU TO DO YOUR WORK, TAKE CARE OF THINGS AT HOME, OR GET ALONG WITH OTHER PEOPLE: VERY DIFFICULT
1. FEELING NERVOUS, ANXIOUS, OR ON EDGE: NEARLY EVERY DAY
2. NOT BEING ABLE TO STOP OR CONTROL WORRYING: NEARLY EVERY DAY
3. WORRYING TOO MUCH ABOUT DIFFERENT THINGS: NEARLY EVERY DAY
7. FEELING AFRAID AS IF SOMETHING AWFUL MIGHT HAPPEN: MORE THAN HALF THE DAYS

## 2018-10-09 ASSESSMENT — PATIENT HEALTH QUESTIONNAIRE - PHQ9: 5. POOR APPETITE OR OVEREATING: NEARLY EVERY DAY

## 2018-10-09 ASSESSMENT — PAIN SCALES - GENERAL: PAINLEVEL: MODERATE PAIN (5)

## 2018-10-09 NOTE — PROGRESS NOTES
SUBJECTIVE:                                                    Valorie Awad is a 23 year old female who presents to clinic today for the following health issues: pt was chewing on a hard piece of candy 5 days ago. It broke and scratched the roof of her mouth. Now painful      Mouth sores      Duration: 5 days    Description (location/character/radiation): roof of mouth    Intensity:  moderate    Accompanying signs and symptoms: painful    History (similar episodes/previous evaluation): None    Precipitating or alleviating factors: None    Therapies tried and outcome: None           Problem list and histories reviewed & adjusted, as indicated.  Additional history: as documented    Patient Active Problem List   Diagnosis     Anxiety state     Migraine     Spasm of muscle     ACP (advance care planning)     Past Surgical History:   Procedure Laterality Date     Comminuted fracture elbow Left 10/25/2013    lauren placed     CYSTOSCOPY      bladder distention     TONSILLECTOMY         Social History   Substance Use Topics     Smoking status: Never Smoker     Smokeless tobacco: Never Used     Alcohol use 0.0 oz/week     0 Standard drinks or equivalent per week      Comment: social     Family History   Problem Relation Age of Onset     Other - See Comments Father      alcoholism     Depression Father      Mental Illness Father      Depression Mother      Irritable Bowel Syndrome Mother      Mental Illness Mother      Other - See Comments Mother      migraine headache     Other - See Comments Maternal Grandmother      blood disease     Lipids Maternal Grandmother      hyperlipidemia     Thyroid Disease Maternal Grandmother      Diabetes No family hx of      Asthma No family hx of          Current Outpatient Prescriptions   Medication Sig Dispense Refill     Acetaminophen (TYLENOL PO) Take 1,000 mg by mouth every 6 hours as needed for mild pain or fever       clonazePAM (KLONOPIN) 2 MG tablet TAKE ONE TABLET BY MOUTH EVERY  "DAY AS NEEDED FOR ANXIETY 30 tablet 3     Desogestrel-Ethinyl Estradiol (ISIBLOOM PO) Take by mouth daily       hydrOXYzine (ATARAX) 10 MG tablet Take 1 tablet (10 mg) by mouth 3 times daily as needed for anxiety 60 tablet 3     nabumetone (RELAFEN) 500 MG tablet Take 1-2 tablets (500-1,000 mg) by mouth 2 times daily as needed for moderate pain 60 tablet 1     propranolol (INDERAL) 10 MG tablet Take 1 tablet (10 mg) by mouth 2 times daily 60 tablet 3     SUMAtriptan (IMITREX) 100 MG tablet Take 1 tablet (100 mg) by mouth at onset of headache for migraine May repeat in 2 hours if needed: max 2/day; average number of headaches monthly 9 tablet 3     topiramate (TOPAMAX) 25 MG tablet TAKE 1 TABLET BY MOUTH EVERY EVENING FOR 1 WEEK, THEN INCREASE TO 1 TABLET BY MOUTH TWICE DAILY 60 tablet 3     Allergies   Allergen Reactions     Bactrim [Sulfamethoxazole W-Trimethoprim] Rash     Morphine Hcl Nausea and Vomiting     Clindamycin Other (See Comments)     Thrush       Nitrofurantoin      Macrobid      Nitrofurantoin Monohydrate Macrocrystals      Macrobid      Loracarbef Rash     Lorabid       ROS:  Constitutional, HEENT, cardiovascular, pulmonary, gi and gu systems are negative, except as otherwise noted.    OBJECTIVE:                                                    /66  Pulse 83  Temp 98.2  F (36.8  C)  Ht 5' 6\" (1.676 m)  Wt 196 lb (88.9 kg)  SpO2 99%  BMI 31.64 kg/m2  Body mass index is 31.64 kg/(m^2).          Physical Exam:  Constitutional: healthy, alert and no acute distress  Skin: No suspicious rash or skin lesion  ENT: Line of erythema and shallow ulcerations on hard palate.Posterior pharynx moist and pink without edema or exudate.  EAC's clear. TM's intact bilateral.  CV: RRR. No murmur  Pulm: Lungs clear to auscultation without wheeze, rales or rhonchi  Psych: mentation and affect appear normal                   ASSESSMENT/PLAN:                                                    (K12.0) Aphthous " ulcer  (primary encounter diagnosis)  Comment: Use 2-3 times daily until resolved  Plan: triamcinolone (KENALOG) 0.1 % paste                Follow up if symptoms persist or worsen.      SONIA Ponce  [facility]

## 2018-10-09 NOTE — MR AVS SNAPSHOT
"              After Visit Summary   10/9/2018    Valorie Awad    MRN: [id number]           Patient Information     Date Of Birth          1995        Visit Information        Provider Department      10/9/2018 3:15 PM Cecy Gonzalez PA [facility]        Today's Diagnoses     Aphthous ulcer    -  1       Follow-ups after your visit        Your next 10 appointments already scheduled     Oct 18, 2018  2:15 PM CDT   (Arrive by 2:00 PM)   [facility]    [address]  [place] [address]   [phone]              Who to contact     If you have questions or need follow up information about today's clinic visit or your schedule please contact [facility] directly at [phone].  Normal or non-critical lab and imaging results will be communicated to you by MyChart, letter or phone within 4 business days after the clinic has received the results. If you do not hear from us within 7 days, please contact the clinic through MyChart or phone. If you have a critical or abnormal lab result, we will notify you by phone as soon as possible.  Submit refill requests through [email or web address] or call your pharmacy and they will forward the refill request to us. Please allow 3 business days for your refill to be completed.          Additional Information About Your Visit        MyChart Information     [email or web address] lets you send messages to your doctor, view your test results, renew your prescriptions, schedule appointments and more. To sign up, go to [email or web address] . Click on \"Log in\" on the left side of the screen, which will take you to the Welcome page. Then click on \"Sign up Now\" on the right side of the page.     You will be asked to enter the access code listed below, as well as some personal information. Please follow the directions to create your username and password.     Your access " "code is: [email or web address]  Expires: 2018  6:03 PM     Your access code will  in 90 days. If you need help or a new code, please call your [facility] clinic or [phone].        Care EveryWhere ID     This is your Care EveryWhere ID. This could be used by other organizations to access your [facility] medical records  [id number]        Your Vitals Were     Pulse Temperature Height Pulse Oximetry BMI (Body Mass Index)       83 98.2  F (36.8  C) 5' 6\" (1.676 m) 99% 31.64 kg/m2        Blood Pressure from Last 3 Encounters:   10/09/18 108/66   10/05/18 126/74   18 122/81    Weight from Last 3 Encounters:   10/09/18 196 lb (88.9 kg)   03/15/18 205 lb (93 kg)   18 202 lb (91.6 kg)              Today, you had the following     No orders found for display         Today's Medication Changes          These changes are accurate as of 10/9/18  4:19 PM.  If you have any questions, ask your nurse or doctor.               Start taking these medicines.        Dose/Directions    triamcinolone 0.1 % paste   Commonly known as:  KENALOG   Used for:  Aphthous ulcer   Started by:  Cecy Gonzalez PA        Take by mouth 3 times daily   Quantity:  5 g   Refills:  0            Where to get your medicines      These medications were sent to [facility] [address] [place] [address] [address] AT [facility] [address], [facility] [address]     Phone:  [phone]     triamcinolone 0.1 % paste                Primary Care Provider Office Phone # Fax #    Bisi Muro [phone]       [facility] [address]        Equal Access to Services     [facility] LUANNE AH: Titus merchant Sonomi, waaxda luqadaha, qaybta kaalmada albino, rafita barriga. So [facility] [phone].    ATENCIÓN: Si habla español, tiene a yao disposición servicios gratuitos de asistencia lingüística. Llame al [phone].    We comply with applicable federal civil " rights laws and [place] laws. We do not discriminate on the basis of race, color, national origin, age, disability, sex, sexual orientation, or gender identity.            Thank you!     Thank you for choosing [facility]  for your care. Our goal is always to provide you with excellent care. Hearing back from our patients is one way we can continue to improve our services. Please take a few minutes to complete the written survey that you may receive in the mail after your visit with us. Thank you!             Your Updated Medication List - Protect others around you: Learn how to safely use, store and throw away your medicines at www.disposemymeds.org.          This list is accurate as of 10/9/18  4:19 PM.  Always use your most recent med list.                   Brand Name Dispense Instructions for use Diagnosis    clonazePAM 2 MG tablet    klonoPIN    30 tablet    TAKE ONE TABLET BY MOUTH EVERY DAY AS NEEDED FOR ANXIETY    Anxiety       hydrOXYzine 10 MG tablet    ATARAX    60 tablet    Take 1 tablet (10 mg) by mouth 3 times daily as needed for anxiety    NITZA (generalized anxiety disorder)       ISIBLOOM PO      Take by mouth daily        nabumetone 500 MG tablet    RELAFEN    60 tablet    Take 1-2 tablets (500-1,000 mg) by mouth 2 times daily as needed for moderate pain    Acute right-sided low back pain with right-sided sciatica       propranolol 10 MG tablet    INDERAL    60 tablet    Take 1 tablet (10 mg) by mouth 2 times daily    NITZA (generalized anxiety disorder)       SUMAtriptan 100 MG tablet    IMITREX    9 tablet    Take 1 tablet (100 mg) by mouth at onset of headache for migraine May repeat in 2 hours if needed: max 2/day; average number of headaches monthly    Intractable chronic migraine without aura and without status migrainosus       topiramate 25 MG tablet    TOPAMAX    60 tablet    TAKE 1 TABLET BY MOUTH EVERY EVENING FOR 1 WEEK, THEN INCREASE TO 1 TABLET BY MOUTH TWICE  DAILY    Intractable chronic migraine without aura and without status migrainosus       triamcinolone 0.1 % paste    KENALOG    5 g    Take by mouth 3 times daily    Aphthous ulcer       TYLENOL PO      Take 1,000 mg by mouth every 6 hours as needed for mild pain or fever

## 2018-10-09 NOTE — TELEPHONE ENCOUNTER
GENERIC ADULT  Valorie Awad is a 23 year old female who calls with pain in roof of mouth, pt reports cutting roof of mouth while eating a hard piece of candy on Friday. Pt reports she feels it's infected. Roof of mouth is red with white dots covering roof of mouth, denies fever, denies chills, rates pain 4 out of 10 describes as constant. Unable to check temperate at home. Due to scheduling pt was put on  clinic schedule.     RECOMMENDED DISPOSITION:  appt made    Will comply with recommendation: Yes    If further questions/concerns or if symptoms do not improve, worsen or new symptoms develop, call your PCP or [facility] as soon as possible.      Guideline used: Adult Telephone Protocols Office Version 3rd Edition - Everett Gore MD, FACEP      Candace Gr RN    Next 5 appointments (look out 90 days)     Oct 09, 2018  3:15 PM CDT   (Arrive by 3:00 PM)   SHORT with SONIA Westbrook   [facility]    [address] [place] [address]   [phone]            Oct 18, 2018  2:15 PM CDT   (Arrive by 2:00 PM)   SHORT with Bisi Muro MD   [facility]    [address] [place] [address]   [phone]

## 2018-10-09 NOTE — NURSING NOTE
"Chief Complaint   Patient presents with     Mouth Problem       Initial /66  Pulse 83  Temp 98.2  F (36.8  C)  Ht 5' 6\" (1.676 m)  Wt 196 lb (88.9 kg)  SpO2 99%  BMI 31.64 kg/m2 Estimated body mass index is 31.64 kg/(m^2) as calculated from the following:    Height as of this encounter: 5' 6\" (1.676 m).    Weight as of this encounter: 196 lb (88.9 kg).  Medication Reconciliation: complete    Brandi Galindo LPN  "

## 2018-10-10 ASSESSMENT — ANXIETY QUESTIONNAIRES: GAD7 TOTAL SCORE: 18

## 2018-10-10 ASSESSMENT — PATIENT HEALTH QUESTIONNAIRE - PHQ9: SUM OF ALL RESPONSES TO PHQ QUESTIONS 1-9: 19

## 2018-10-16 NOTE — PROGRESS NOTES
SUBJECTIVE:   Valorie Awad is a 23 year old female who presents to clinic today for the following health issues:      Depression Followup    Status since last visit: unchanged in last 2 weeks    See PHQ-9 for current symptoms.  Other associated symptoms: None    Complicating factors:   Significant life event:  No   Current substance abuse:  None  Anxiety or Panic symptoms:  Yes-  Anxiety attacks daily    PHQ 3/15/2018 10/5/2018 10/9/2018   PHQ-9 Total Score 7 16 19   Q9: Suicide Ideation Not at all Not at all Not at all       PHQ-9  English  PHQ-9   Any Language      Vomiting       Duration: 12 months duration, worse in the morning-like morning sickness, after vomiting feels better. Food going with or w/o has not helped    Description (location/character/radiation): see above    Intensity:  moderate    Accompanying signs and symptoms: has panic attacks    History (similar episodes/previous evaluation): has had on and off previously    Precipitating or alleviating factors: None    Therapies tried and outcome: None   Eating sometimes helps and sometimes doesn't. This occurs only in the morning and once she has vomited she feels better.     Migraine Follow-Up    Headaches symptoms:  Worsened more frequent and MELLO not helping    Frequency: cycle  One weekly and previously 3 or more     Duration of headaches: gets h/a and sleeps    Able to do normal daily activities/work with migraines: No - , sleeping helps    Rescue/Relief medication:sumatriptan (Imitrex)              Effectiveness: minor relief    Preventative medication:     Neurologic complications: loss of vision-very blurry    In the past 4 weeks, how often have you gone to Urgent Care or the emergency room because of your headaches?  0  topamax was just started for her again for prophylaxis. Maxalt worked well in the past.       HSV infection  This was diagnosed in Urgent Care, both HSV 1 and 2, both IgG which we discussed was not an acute infection. She has  not had lesions with blistering or any major outbreak in the past. She is concerned about how to go on with this. She is not currently having symptoms      Problem list and histories reviewed & adjusted, as indicated.  Additional history: as documented    Patient Active Problem List   Diagnosis     Anxiety state     Migraine     Spasm of muscle     ACP (advance care planning)     Panic disorder without agoraphobia     Moderate episode of recurrent major depressive disorder (H)     Past Surgical History:   Procedure Laterality Date     Comminuted fracture elbow Left 10/25/2013    lauren placed     CYSTOSCOPY      bladder distention     TONSILLECTOMY         Social History   Substance Use Topics     Smoking status: Never Smoker     Smokeless tobacco: Never Used     Alcohol use 0.0 oz/week     0 Standard drinks or equivalent per week      Comment: social     Family History   Problem Relation Age of Onset     Other - See Comments Father      alcoholism     Depression Father      Mental Illness Father      Depression Mother      Irritable Bowel Syndrome Mother      Mental Illness Mother      Other - See Comments Mother      migraine headache     Other - See Comments Maternal Grandmother      blood disease     Lipids Maternal Grandmother      hyperlipidemia     Thyroid Disease Maternal Grandmother      Diabetes No family hx of      Asthma No family hx of          Current Outpatient Prescriptions   Medication Sig Dispense Refill     Acetaminophen (TYLENOL PO) Take 1,000 mg by mouth every 6 hours as needed for mild pain or fever       clonazePAM (KLONOPIN) 2 MG tablet TAKE ONE TABLET BY MOUTH EVERY DAY AS NEEDED FOR ANXIETY 30 tablet 3     Desogestrel-Ethinyl Estradiol (ISIBLOOM PO) Take by mouth daily       hydrOXYzine (ATARAX) 10 MG tablet Take 1 tablet (10 mg) by mouth 3 times daily as needed for anxiety 60 tablet 3     nabumetone (RELAFEN) 500 MG tablet Take 1-2 tablets (500-1,000 mg) by mouth 2 times daily as needed for  "moderate pain 60 tablet 1     omeprazole (PRILOSEC) 40 MG capsule Take 1 capsule (40 mg) by mouth daily Take 30-60 minutes before a meal. 30 capsule 3     propranolol (INDERAL) 10 MG tablet Take 1 tablet (10 mg) by mouth 2 times daily 60 tablet 3     rizatriptan (MAXALT-MLT) 10 MG ODT tab Take 1 tablet (10 mg) by mouth at onset of headache for migraine May repeat in 2 hours. Max 3 tablets/24 hours. 6 tablet 3     SUMAtriptan (IMITREX) 100 MG tablet Take 1 tablet (100 mg) by mouth at onset of headache for migraine May repeat in 2 hours if needed: max 2/day; average number of headaches monthly 9 tablet 3     topiramate (TOPAMAX) 25 MG tablet TAKE 1 TABLET BY MOUTH EVERY EVENING FOR 1 WEEK, THEN INCREASE TO 1 TABLET BY MOUTH TWICE DAILY 60 tablet 3     triamcinolone (KENALOG) 0.1 % paste Take by mouth 3 times daily 5 g 0     Allergies   Allergen Reactions     Bactrim [Sulfamethoxazole W-Trimethoprim] Rash     Morphine Hcl Nausea and Vomiting     Clindamycin Other (See Comments)     Thrush       Nitrofurantoin      Macrobid      Nitrofurantoin Monohydrate Macrocrystals      Macrobid      Loracarbef Rash     Lorabid     BP Readings from Last 3 Encounters:   10/18/18 114/72   10/09/18 108/66   10/05/18 126/74    Wt Readings from Last 3 Encounters:   10/18/18 195 lb (88.5 kg)   10/09/18 196 lb (88.9 kg)   03/15/18 205 lb (93 kg)                    Reviewed and updated as needed this visit by clinical staff       Reviewed and updated as needed this visit by Provider         ROS:  Constitutional, HEENT, cardiovascular, pulmonary, gi and gu systems are negative, except as otherwise noted.    OBJECTIVE:                                                    /72  Pulse 94  Temp 98  F (36.7  C) (Tympanic)  Resp 16  Ht 5' 6\" (1.676 m)  Wt 195 lb (88.5 kg)  SpO2 98%  BMI 31.47 kg/m2  Body mass index is 31.47 kg/(m^2).  GENERAL APPEARANCE: healthy, alert and no distress  EYES: Eyes grossly normal to inspection, PERRL and " conjunctivae and sclerae normal  HENT: ear canals and TM's normal and nose and mouth without ulcers or lesions  NECK: no adenopathy, no asymmetry, masses, or scars and thyroid normal to palpation  RESP: lungs clear to auscultation - no rales, rhonchi or wheezes  CV: regular rates and rhythm, normal S1 S2, no S3 or S4 and no murmur, click or rub  LYMPHATICS: no cervical adenopathy  ABDOMEN: soft, nontender, without hepatosplenomegaly or masses and bowel sounds normal  NEURO: Normal strength and tone, mentation intact, speech normal, DTR symmetrically normal in lower extremities and cranial nerves 2-12 intact  PSYCH: mentation appears normal and affect normal/bright    Results for orders placed or performed in visit on 10/18/18   HCG quantitative pregnancy   Result Value Ref Range    HCG Quantitative Serum <1 0 - 5 IU/L   TSH with free T4 reflex   Result Value Ref Range    TSH 0.60 0.40 - 4.00 mU/L          ASSESSMENT/PLAN:                                                    1. Moderate episode of recurrent major depressive disorder (H)  This is followed by Dr Leahy who has recently started propranolol for her.     2. Panic disorder without agoraphobia  Continue current medications. This is stable    3. Intractable chronic migraine without aura and without status migrainosus  maxalt is renewed for her, continue Topamax  - rizatriptan (MAXALT-MLT) 10 MG ODT tab; Take 1 tablet (10 mg) by mouth at onset of headache for migraine May repeat in 2 hours. Max 3 tablets/24 hours.  Dispense: 6 tablet; Refill: 3    4. Intractable vomiting with nausea, unspecified vomiting type  Check labs, consider MRI if normal with morning emesis and worsening headaches   - HCG quantitative pregnancy  - TSH with free T4 reflex  - Prolactin  - omeprazole (PRILOSEC) 40 MG capsule; Take 1 capsule (40 mg) by mouth daily Take 30-60 minutes before a meal.  Dispense: 30 capsule; Refill: 3    5. HSV (herpes simplex virus) infection  Discussed using  Valtrex if she has an outbreak, using condoms consistently and following up for concerns.Discussed the difference between type 1 and 2.  Will have valtrex on order for her. She will call in when she needs it.           Bisi Muro MD  [facility]

## 2018-10-18 ENCOUNTER — OFFICE VISIT (OUTPATIENT)
Dept: FAMILY MEDICINE | Facility: OTHER | Age: 23
End: 2018-10-18
Attending: FAMILY MEDICINE
Payer: COMMERCIAL

## 2018-10-18 VITALS
RESPIRATION RATE: 16 BRPM | HEART RATE: 94 BPM | OXYGEN SATURATION: 98 % | TEMPERATURE: 98 F | SYSTOLIC BLOOD PRESSURE: 114 MMHG | HEIGHT: 66 IN | DIASTOLIC BLOOD PRESSURE: 72 MMHG | WEIGHT: 195 LBS | BODY MASS INDEX: 31.34 KG/M2

## 2018-10-18 DIAGNOSIS — R11.2 INTRACTABLE VOMITING WITH NAUSEA, UNSPECIFIED VOMITING TYPE: ICD-10-CM

## 2018-10-18 DIAGNOSIS — F33.1 MODERATE EPISODE OF RECURRENT MAJOR DEPRESSIVE DISORDER (H): Primary | ICD-10-CM

## 2018-10-18 DIAGNOSIS — G43.719 INTRACTABLE CHRONIC MIGRAINE WITHOUT AURA AND WITHOUT STATUS MIGRAINOSUS: ICD-10-CM

## 2018-10-18 DIAGNOSIS — F41.0 PANIC DISORDER WITHOUT AGORAPHOBIA: ICD-10-CM

## 2018-10-18 DIAGNOSIS — B00.9 HSV (HERPES SIMPLEX VIRUS) INFECTION: ICD-10-CM

## 2018-10-18 LAB
B-HCG SERPL-ACNC: <1 IU/L (ref 0–5)
TSH SERPL DL<=0.005 MIU/L-ACNC: 0.6 MU/L (ref 0.4–4)

## 2018-10-18 PROCEDURE — G0463 HOSPITAL OUTPT CLINIC VISIT: HCPCS

## 2018-10-18 PROCEDURE — 99000 SPECIMEN HANDLING OFFICE-LAB: CPT | Performed by: FAMILY MEDICINE

## 2018-10-18 PROCEDURE — 99214 OFFICE O/P EST MOD 30 MIN: CPT | Performed by: FAMILY MEDICINE

## 2018-10-18 PROCEDURE — 36415 COLL VENOUS BLD VENIPUNCTURE: CPT | Mod: ZL | Performed by: FAMILY MEDICINE

## 2018-10-18 PROCEDURE — 84443 ASSAY THYROID STIM HORMONE: CPT | Mod: ZL | Performed by: FAMILY MEDICINE

## 2018-10-18 PROCEDURE — 84702 CHORIONIC GONADOTROPIN TEST: CPT | Mod: ZL | Performed by: FAMILY MEDICINE

## 2018-10-18 PROCEDURE — 84146 ASSAY OF PROLACTIN: CPT | Mod: ZL | Performed by: FAMILY MEDICINE

## 2018-10-18 RX ORDER — RIZATRIPTAN BENZOATE 10 MG/1
10 TABLET, ORALLY DISINTEGRATING ORAL
Qty: 6 TABLET | Refills: 3 | Status: SHIPPED | OUTPATIENT
Start: 2018-10-18 | End: 2019-03-19

## 2018-10-18 RX ORDER — OMEPRAZOLE 40 MG/1
40 CAPSULE, DELAYED RELEASE ORAL DAILY
Qty: 30 CAPSULE | Refills: 3 | Status: SHIPPED | OUTPATIENT
Start: 2018-10-18 | End: 2019-01-03

## 2018-10-18 ASSESSMENT — ANXIETY QUESTIONNAIRES
5. BEING SO RESTLESS THAT IT IS HARD TO SIT STILL: SEVERAL DAYS
2. NOT BEING ABLE TO STOP OR CONTROL WORRYING: MORE THAN HALF THE DAYS
4. TROUBLE RELAXING: MORE THAN HALF THE DAYS
1. FEELING NERVOUS, ANXIOUS, OR ON EDGE: MORE THAN HALF THE DAYS
GAD7 TOTAL SCORE: 12
6. BECOMING EASILY ANNOYED OR IRRITABLE: MORE THAN HALF THE DAYS
IF YOU CHECKED OFF ANY PROBLEMS ON THIS QUESTIONNAIRE, HOW DIFFICULT HAVE THESE PROBLEMS MADE IT FOR YOU TO DO YOUR WORK, TAKE CARE OF THINGS AT HOME, OR GET ALONG WITH OTHER PEOPLE: SOMEWHAT DIFFICULT
3. WORRYING TOO MUCH ABOUT DIFFERENT THINGS: MORE THAN HALF THE DAYS
7. FEELING AFRAID AS IF SOMETHING AWFUL MIGHT HAPPEN: SEVERAL DAYS

## 2018-10-18 ASSESSMENT — PAIN SCALES - GENERAL: PAINLEVEL: NO PAIN (0)

## 2018-10-18 NOTE — NURSING NOTE
"Chief Complaint   Patient presents with     Vomiting     Flu Shot     Depression       Initial /72  Pulse 94  Temp 98  F (36.7  C) (Tympanic)  Resp 16  Ht 5' 6\" (1.676 m)  Wt 195 lb (88.5 kg)  SpO2 98%  BMI 31.47 kg/m2 Estimated body mass index is 31.47 kg/(m^2) as calculated from the following:    Height as of this encounter: 5' 6\" (1.676 m).    Weight as of this encounter: 195 lb (88.5 kg).  Medication Reconciliation: complete    Evelyn Massey, JACEK    "

## 2018-10-18 NOTE — MR AVS SNAPSHOT
After Visit Summary   10/18/2018    Valorie Awad    MRN: [id number]           Patient Information     Date Of Birth          1995        Visit Information        Provider Department      10/18/2018 2:15 PM Bisi Muro MD [facility]        Today's Diagnoses     Moderate episode of recurrent major depressive disorder (H)    -  1    Panic disorder without agoraphobia        Intractable chronic migraine without aura and without status migrainosus        Intractable vomiting with nausea, unspecified vomiting type           Follow-ups after your visit        Follow-up notes from your care team     Return in 4 weeks (on 11/15/2018) for recheck emesis.      Your next 10 appointments already scheduled     Nov 27, 2018  2:45 PM CST   (Arrive by 2:30 PM)   SHORT with Bisi Muro MD   [facility] [place] ([facility]    [address]  [place] [address]   [phone]              Who to contact     If you have questions or need follow up information about today's clinic visit or your schedule please contact [facility] directly at [phone].  Normal or non-critical lab and imaging results will be communicated to you by MyChart, letter or phone within 4 business days after the clinic has received the results. If you do not hear from us within 7 days, please contact the clinic through MyChart or phone. If you have a critical or abnormal lab result, we will notify you by phone as soon as possible.  Submit refill requests through [employer] or call your pharmacy and they will forward the refill request to us. Please allow 3 business days for your refill to be completed.          Additional Information About Your Visit        Care EveryWhere ID     This is your Care EveryWhere ID. This could be used by other organizations to access your [facility] medical records  [id number]        Your Vitals Were     Pulse Temperature  "Respirations Height Pulse Oximetry BMI (Body Mass Index)    94 98  F (36.7  C) (Tympanic) 16 5' 6\" (1.676 m) 98% 31.47 kg/m2       Blood Pressure from Last 3 Encounters:   10/18/18 114/72   10/09/18 108/66   10/05/18 126/74    Weight from Last 3 Encounters:   10/18/18 195 lb (88.5 kg)   10/09/18 196 lb (88.9 kg)   03/15/18 205 lb (93 kg)              We Performed the Following     HCG quantitative pregnancy     Prolactin     TSH with free T4 reflex          Today's Medication Changes          These changes are accurate as of 10/18/18  3:01 PM.  If you have any questions, ask your nurse or doctor.               Start taking these medicines.        Dose/Directions    omeprazole 40 MG capsule   Commonly known as:  priLOSEC   Used for:  Intractable vomiting with nausea, unspecified vomiting type   Started by:  Bisi Muro MD        Dose:  40 mg   Take 1 capsule (40 mg) by mouth daily Take 30-60 minutes before a meal.   Quantity:  30 capsule   Refills:  3       rizatriptan 10 MG ODT tab   Commonly known as:  MAXALT-MLT   Used for:  Intractable chronic migraine without aura and without status migrainosus   Started by:  Bisi Muro MD        Dose:  10 mg   Take 1 tablet (10 mg) by mouth at onset of headache for migraine May repeat in 2 hours. Max 3 tablets/24 hours.   Quantity:  6 tablet   Refills:  3            Where to get your medicines      [id number] - [place], [place] - [address] AT [facility] [address], [facility] [address]     Phone:  [phone]     omeprazole 40 MG capsule    rizatriptan 10 MG ODT tab                Primary Care Provider Office Phone # Fax #    Bisi Muro [phone]       [facility] [facility] [place] [address]        Equal Access to Services     RAÚL STROUD AH: Hadii aad ku hadasho Soomaali, waaxda luqadaha, qaybta kaalmada adeegyada, rafita barriga. So [facility] " [phone].    ATENCIÓN: Si reginald medrano, tiene a yao disposición servicios gratuitos de asistencia lingüística. Caterina salomon [phone].    We comply with applicable federal civil rights laws and [place] laws. We do not discriminate on the basis of race, color, national origin, age, disability, sex, sexual orientation, or gender identity.            Thank you!     Thank you for choosing [facility]  for your care. Our goal is always to provide you with excellent care. Hearing back from our patients is one way we can continue to improve our services. Please take a few minutes to complete the written survey that you may receive in the mail after your visit with us. Thank you!             Your Updated Medication List - Protect others around you: Learn how to safely use, store and throw away your medicines at www.disposemymeds.org.          This list is accurate as of 10/18/18  3:01 PM.  Always use your most recent med list.                   Brand Name Dispense Instructions for use Diagnosis    clonazePAM 2 MG tablet    klonoPIN    30 tablet    TAKE ONE TABLET BY MOUTH EVERY DAY AS NEEDED FOR ANXIETY    Anxiety       hydrOXYzine 10 MG tablet    ATARAX    60 tablet    Take 1 tablet (10 mg) by mouth 3 times daily as needed for anxiety    NITZA (generalized anxiety disorder)       ISIBLOOM PO      Take by mouth daily        nabumetone 500 MG tablet    RELAFEN    60 tablet    Take 1-2 tablets (500-1,000 mg) by mouth 2 times daily as needed for moderate pain    Acute right-sided low back pain with right-sided sciatica       omeprazole 40 MG capsule    priLOSEC    30 capsule    Take 1 capsule (40 mg) by mouth daily Take 30-60 minutes before a meal.    Intractable vomiting with nausea, unspecified vomiting type       propranolol 10 MG tablet    INDERAL    60 tablet    Take 1 tablet (10 mg) by mouth 2 times daily    NITZA (generalized anxiety disorder)       rizatriptan 10 MG ODT tab    MAXALT-MLT    6  tablet    Take 1 tablet (10 mg) by mouth at onset of headache for migraine May repeat in 2 hours. Max 3 tablets/24 hours.    Intractable chronic migraine without aura and without status migrainosus       SUMAtriptan 100 MG tablet    IMITREX    9 tablet    Take 1 tablet (100 mg) by mouth at onset of headache for migraine May repeat in 2 hours if needed: max 2/day; average number of headaches monthly    Intractable chronic migraine without aura and without status migrainosus       topiramate 25 MG tablet    TOPAMAX    60 tablet    TAKE 1 TABLET BY MOUTH EVERY EVENING FOR 1 WEEK, THEN INCREASE TO 1 TABLET BY MOUTH TWICE DAILY    Intractable chronic migraine without aura and without status migrainosus       triamcinolone 0.1 % paste    KENALOG    5 g    Take by mouth 3 times daily    Aphthous ulcer       TYLENOL PO      Take 1,000 mg by mouth every 6 hours as needed for mild pain or fever

## 2018-10-18 NOTE — LETTER
My Depression Action Plan  Name: Valorie Awad   Date of Birth 1995  Date: 10/16/2018    My doctor: Bisi Muro   My clinic: [facility]  [address] [place] [address]  [phone]          GREEN    ZONE   Good Control    What it looks like:     Things are going generally well. You have normal up s and down s. You may even feel depressed from time to time, but bad moods usually last less than a day.   What you need to do:  1. Continue to care for yourself (see self care plan)  2. Check your depression survival kit and update it as needed  3. Follow your physician s recommendations including any medication.  4. Do not stop taking medication unless you consult with your physician first.           YELLOW         ZONE Getting Worse    What it looks like:     Depression is starting to interfere with your life.     It may be hard to get out of bed; you may be starting to isolate yourself from others.    Symptoms of depression are starting to last most all day and this has happened for several days.     You may have suicidal thoughts but they are not constant.   What you need to do:     1. Call your care team, your response to treatment will improve if you keep your care team informed of your progress. Yellow periods are signs an adjustment may need to be made.     2. Continue your self-care, even if you have to fake it!    3. Talk to someone in your support network    4. Open up your depression survival kit           RED    ZONE Medical Alert - Get Help    What it looks like:     Depression is seriously interfering with your life.     You may experience these or other symptoms: You can t get out of bed most days, can t work or engage in other necessary activities, you have trouble taking care of basic hygiene, or basic responsibilities, thoughts of suicide or death that will not go away, self-injurious behavior.     What you need to do:  1. Call your care team and request a  same-day appointment. If they are not available (weekends or after hours) call your local crisis line, emergency room or 911.            Depression Self Care Plan / Survival Kit    Self-Care for Depression  Here s the deal. Your body and mind are really not as separate as most people think.  What you do and think affects how you feel and how you feel influences what you do and think. This means if you do things that people who feel good do, it will help you feel better.  Sometimes this is all it takes.  There is also a place for medication and therapy depending on how severe your depression is, so be sure to consult with your medical provider and/ or Behavioral Health Consultant if your symptoms are worsening or not improving.     In order to better manage my stress, I will:    Exercise  Get some form of exercise, every day. This will help reduce pain and release endorphins, the  feel good  chemicals in your brain. This is almost as good as taking antidepressants!  This is not the same as joining a gym and then never going! (they count on that by the way ) It can be as simple as just going for a walk or doing some gardening, anything that will get you moving.      Hygiene   Maintain good hygiene (Get out of bed in the morning, Make your bed, Brush your teeth, Take a shower, and Get dressed like you were going to work, even if you are unemployed).  If your clothes don't fit try to get ones that do.    Diet  I will strive to eat foods that are good for me, drink plenty of water, and avoid excessive sugar, caffeine, alcohol, and other mood-altering substances.  Some foods that are helpful in depression are: complex carbohydrates, B vitamins, flaxseed, fish or fish oil, fresh fruits and vegetables.    Psychotherapy  I agree to participate in Individual Therapy (if recommended).    Medication  If prescribed medications, I agree to take them.  Missing doses can result in serious side effects.  I understand that drinking  alcohol, or other illicit drug use, may cause potential side effects.  I will not stop my medication abruptly without first discussing it with my provider.    Staying Connected With Others  I will stay in touch with my friends, family members, and my primary care provider/team.    Use your imagination  Be creative.  We all have a creative side; it doesn t matter if it s oil painting, sand castles, or mud pies! This will also kick up the endorphins.    Witness Beauty  (AKA stop and smell the roses) Take a look outside, even in mid-winter. Notice colors, textures. Watch the squirrels and birds.     Service to others  Be of service to others.  There is always someone else in need.  By helping others we can  get out of ourselves  and remember the really important things.  This also provides opportunities for practicing all the other parts of the program.    Humor  Laugh and be silly!  Adjust your TV habits for less news and crime-drama and more comedy.    Control your stress  Try breathing deep, massage therapy, biofeedback, and meditation. Find time to relax each day.     My support system    Clinic Contact:  Phone number:    Contact 1:  Phone number:    Contact 2:  Phone number:    Sikh/:  Phone number:    Therapist:  Phone number:    Local crisis center:    Phone number:    Other community support:  Phone number:

## 2018-10-19 RX ORDER — VALACYCLOVIR HYDROCHLORIDE 500 MG/1
500 TABLET, FILM COATED ORAL 2 TIMES DAILY
Qty: 6 TABLET | Refills: 3 | COMMUNITY
Start: 2018-10-19 | End: 2018-11-28

## 2018-10-19 ASSESSMENT — ANXIETY QUESTIONNAIRES: GAD7 TOTAL SCORE: 12

## 2018-10-19 ASSESSMENT — PATIENT HEALTH QUESTIONNAIRE - PHQ9: SUM OF ALL RESPONSES TO PHQ QUESTIONS 1-9: 12

## 2018-10-22 LAB — PROLACTIN SERPL-MCNC: 15 UG/L (ref 3–27)

## 2018-11-08 ENCOUNTER — HOSPITAL ENCOUNTER (OUTPATIENT)
Dept: MRI IMAGING | Facility: HOSPITAL | Age: 23
Discharge: HOME | End: 2018-11-08
Attending: FAMILY MEDICINE | Admitting: FAMILY MEDICINE
Payer: COMMERCIAL

## 2018-11-08 DIAGNOSIS — R11.2 INTRACTABLE VOMITING WITH NAUSEA, UNSPECIFIED VOMITING TYPE: ICD-10-CM

## 2018-11-08 DIAGNOSIS — G43.719 INTRACTABLE CHRONIC MIGRAINE WITHOUT AURA AND WITHOUT STATUS MIGRAINOSUS: ICD-10-CM

## 2018-11-08 PROCEDURE — 70553 MRI BRAIN STEM W/O & W/DYE: CPT | Mod: TC

## 2018-11-08 PROCEDURE — 25500064 ZZH RX 255 OP 636: Performed by: RADIOLOGY

## 2018-11-08 PROCEDURE — A9585 GADOBUTROL INJECTION: HCPCS | Performed by: RADIOLOGY

## 2018-11-08 RX ORDER — GADOBUTROL 604.72 MG/ML
7.5 INJECTION INTRAVENOUS ONCE
Status: COMPLETED | OUTPATIENT
Start: 2018-11-08 | End: 2018-11-08

## 2018-11-08 RX ORDER — GADOBUTROL 604.72 MG/ML
2 INJECTION INTRAVENOUS ONCE
Status: COMPLETED | OUTPATIENT
Start: 2018-11-08 | End: 2018-11-08

## 2018-11-08 RX ADMIN — GADOBUTROL 7.5 ML: 604.72 INJECTION INTRAVENOUS at 18:08

## 2018-11-08 RX ADMIN — GADOBUTROL 2 ML: 604.72 INJECTION INTRAVENOUS at 18:08

## 2018-11-20 ENCOUNTER — OFFICE VISIT (OUTPATIENT)
Dept: PSYCHIATRY | Facility: OTHER | Age: 23
End: 2018-11-20
Attending: PSYCHIATRY & NEUROLOGY
Payer: COMMERCIAL

## 2018-11-20 VITALS
DIASTOLIC BLOOD PRESSURE: 78 MMHG | WEIGHT: 195 LBS | HEART RATE: 83 BPM | SYSTOLIC BLOOD PRESSURE: 116 MMHG | TEMPERATURE: 96.7 F | BODY MASS INDEX: 31.47 KG/M2

## 2018-11-20 DIAGNOSIS — F41.1 GENERALIZED ANXIETY DISORDER: Primary | ICD-10-CM

## 2018-11-20 DIAGNOSIS — G43.719 INTRACTABLE CHRONIC MIGRAINE WITHOUT AURA AND WITHOUT STATUS MIGRAINOSUS: ICD-10-CM

## 2018-11-20 PROCEDURE — 99214 OFFICE O/P EST MOD 30 MIN: CPT | Performed by: PSYCHIATRY & NEUROLOGY

## 2018-11-20 PROCEDURE — G0463 HOSPITAL OUTPT CLINIC VISIT: HCPCS

## 2018-11-20 RX ORDER — ESCITALOPRAM OXALATE 10 MG/1
TABLET ORAL
Qty: 30 TABLET | Refills: 3 | Status: SHIPPED | OUTPATIENT
Start: 2018-11-20 | End: 2019-03-19

## 2018-11-20 RX ORDER — TOPIRAMATE 25 MG/1
25 TABLET, FILM COATED ORAL 2 TIMES DAILY
Qty: 60 TABLET | Refills: 3 | Status: SHIPPED | OUTPATIENT
Start: 2018-11-20 | End: 2019-03-19

## 2018-11-20 RX ORDER — HYDROXYZINE PAMOATE 25 MG/1
CAPSULE ORAL
Qty: 120 CAPSULE | Refills: 3 | Status: SHIPPED | OUTPATIENT
Start: 2018-11-20 | End: 2019-01-03

## 2018-11-20 ASSESSMENT — ANXIETY QUESTIONNAIRES
5. BEING SO RESTLESS THAT IT IS HARD TO SIT STILL: SEVERAL DAYS
1. FEELING NERVOUS, ANXIOUS, OR ON EDGE: NEARLY EVERY DAY
3. WORRYING TOO MUCH ABOUT DIFFERENT THINGS: NEARLY EVERY DAY
IF YOU CHECKED OFF ANY PROBLEMS ON THIS QUESTIONNAIRE, HOW DIFFICULT HAVE THESE PROBLEMS MADE IT FOR YOU TO DO YOUR WORK, TAKE CARE OF THINGS AT HOME, OR GET ALONG WITH OTHER PEOPLE: VERY DIFFICULT
2. NOT BEING ABLE TO STOP OR CONTROL WORRYING: NEARLY EVERY DAY
6. BECOMING EASILY ANNOYED OR IRRITABLE: NEARLY EVERY DAY
GAD7 TOTAL SCORE: 18
7. FEELING AFRAID AS IF SOMETHING AWFUL MIGHT HAPPEN: MORE THAN HALF THE DAYS

## 2018-11-20 ASSESSMENT — PAIN SCALES - GENERAL: PAINLEVEL: NO PAIN (0)

## 2018-11-20 ASSESSMENT — PATIENT HEALTH QUESTIONNAIRE - PHQ9
5. POOR APPETITE OR OVEREATING: NEARLY EVERY DAY
SUM OF ALL RESPONSES TO PHQ QUESTIONS 1-9: 15

## 2018-11-20 NOTE — NURSING NOTE
"Chief Complaint   Patient presents with     RECHECK     Mental health.  Patient states anxiety is not any better       Initial /78 (BP Location: Right arm, Patient Position: Sitting, Cuff Size: Adult Regular)  Pulse 83  Temp 96.7  F (35.9  C) (Tympanic)  Wt 88.5 kg (195 lb)  BMI 31.47 kg/m2 Estimated body mass index is 31.47 kg/(m^2) as calculated from the following:    Height as of 10/18/18: 1.676 m (5' 6\").    Weight as of this encounter: 88.5 kg (195 lb).  Medication Reconciliation: complete    PATY RYAN LPN    "

## 2018-11-20 NOTE — PROGRESS NOTES
PSYCHIATRY CLINIC PROGRESS NOTE   20 minute medication management, more than 50% of time spent counseling patient on medications, medication side effects, symptom history and management   SUBJECTIVE / INTERIM HISTORY                                                                       The pt was last seen in clinic January 2018:   Children-  Mayco is 5 yo.  Last visit: January 2018: : we will continue Klonopin as prn. Going to start venlafaxine 37.5 mg daily for one week then increase to 75 mg daily     -- hydroxyzine really not sure if helping 10 mg doesn't do anything, has take take 20 mg and is thinking may be more of a placebo response. Feels is worth trying still / increase dose  - Topamax IS helping migraines  -- morning is the worse for her. anxiety has been bad -- more bad days unfortunately than good. In past week for example feels had maybe one good day. Sleeps a lot  -- taking Klonopin as prn. 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 Feels mood is stable - doesn't feel depressed at this time.  MEDICAL ROS-  Low appetite. Hip / sciatic nerve pain, Migraines have been better with topamax no GI issues  MEDICAL / SURGICAL HISTORY                pregnant [if applicable]--no   Medical Team:     PMD- Dr. Jo Muro    Therapist- [facility]   Patient Active Problem List   Diagnosis     Anxiety state     Migraine     Spasm of muscle     ACP (advance care planning)     Panic disorder without agoraphobia     Moderate episode of recurrent major depressive disorder (H)     ALLERGY   Bactrim [sulfamethoxazole w-trimethoprim]; Morphine hcl; Clindamycin; Nitrofurantoin;  Nitrofurantoin monohydrate macrocrystals; and Loracarbef  MEDICATIONS                                                                                             Current Outpatient Prescriptions   Medication Sig     Acetaminophen (TYLENOL PO) Take 1,000 mg by mouth every 6 hours as needed for mild pain or fever     clonazePAM (KLONOPIN) 2 MG tablet TAKE ONE TABLET BY MOUTH EVERY DAY AS NEEDED FOR ANXIETY     Desogestrel-Ethinyl Estradiol (ISIBLOOM PO) Take by mouth daily     hydrOXYzine (ATARAX) 10 MG tablet Take 1 tablet (10 mg) by mouth 3 times daily as needed for anxiety     nabumetone (RELAFEN) 500 MG tablet Take 1-2 tablets (500-1,000 mg) by mouth 2 times daily as needed for moderate pain     omeprazole (PRILOSEC) 40 MG capsule Take 1 capsule (40 mg) by mouth daily Take 30-60 minutes before a meal.     propranolol (INDERAL) 10 MG tablet Take 1 tablet (10 mg) by mouth 2 times daily     rizatriptan (MAXALT-MLT) 10 MG ODT tab Take 1 tablet (10 mg) by mouth at onset of headache for migraine May repeat in 2 hours. Max 3 tablets/24 hours.     SUMAtriptan (IMITREX) 100 MG tablet Take 1 tablet (100 mg) by mouth at onset of headache for migraine May repeat in 2 hours if needed: max 2/day; average number of headaches monthly     topiramate (TOPAMAX) 25 MG tablet TAKE 1 TABLET BY MOUTH EVERY EVENING FOR 1 WEEK, THEN INCREASE TO 1 TABLET BY MOUTH TWICE DAILY     triamcinolone (KENALOG) 0.1 % paste Take by mouth 3 times daily     valACYclovir (VALTREX) 500 MG tablet Take 1 tablet (500 mg) by mouth 2 times daily     No current facility-administered medications for this visit.        VITALS   /78 (BP Location: Right arm, Patient Position: Sitting, Cuff Size: Adult Regular)  Pulse 83  Temp 96.7  F (35.9  C) (Tympanic)  Wt 88.5 kg (195 lb)  BMI 31.47 kg/m2     PHQ9                       PHQ-9 SCORE 10/9/2018 10/18/2018 11/20/2018   Total Score - - -   Total Score 19 12 15       MENTAL STATUS EXAM                                                                                         Alert. Oriented to person, place, and date / time. Well groomed, calm, cooperative with good eye contact. No problems with speech or psychomotor behavior. Mood was  anxious and affect was congruent to speech content and full range. Thought process, including associations, was unremarkable and thought content was devoid of suicidal and homicidal ideation and psychotic thought. No hallucinations. Insight was good. Judgment was intact and adequate for safety. Fund of knowledge was intact. Pt demonstrates no obvious problems with attention, concentration, language, recent or remote memory although these were not formally tested.     ASSESSMENT                                                                                                      HISTORICAL:  Initial psych note 2/13/15       Notes: Buspar ineffective. Zoloft. Effexor up to 75 : ineffective and made her feel foggy and sick.   she stopped it. Paxil ineffective. Gabapentin : sedation. Past citalopram in med section (2013). Propranolol 10 mg BID ineffective.  Valorie Awad is a 22 yo with  panic disorder without agoraphobia, MDD, recurrent, mild  and NITZA. Anxiety has been bad, excessive worrying even when everything is fine. Is on propranolol and anxiety is clearly still not well controlled so we will discontinue it as we don't feel is helping any. Will keep topamax as is helping migraines. Hydroxyzine maybe helping and we both feel is worth trying an increase. Reviewed past meds and don't think she has tried lexapro - agreed on starting.     TREATMENT RISK STATEMENT:  The risks, benefits, alternatives and potential adverse effects have been explained and are understood by the pt.  The pt agrees to the treatment plan with the ability to do so.  Discussion of specific concerns included- potential SEs meds.  The pt knows to call the clinic for any problems or access emergency care if needed.   There  are no medical considerations relevant to treatment, as noted above.  Substance use is not a problem as noted above.         DIAGNOSES               Panic disorder without agoraphobia  Major depressive disorder, recurrent, mild to mod  NITZA      PLAN                                                                                                                           1) MEDICATIONS:  Discontinue propranolol 10 mg twice daily. Start Lexapro 5 mg daily one week then increase to 10 mg daily.  We will continue Klonopin as prn. Continue Topamax 25 mg twice daily. Increase hydroxyzine 10 mg up to 3 times daily as needed to 25 to 50 mg up to TID as needed for anxiety.       2) THERAPY: No Change. She was working with a therapist at [employer] [employer]    3) LABS: None    4) PT MONITOR [call for probs]: worsening sx, side effects, if develops SI    5) REFERRALS [CD tx, medical, tests other]: None    6)  RTC: ~she will call to schedule

## 2018-11-20 NOTE — MR AVS SNAPSHOT
After Visit Summary   11/20/2018    Valorie Awad    MRN: [id number]           Patient Information     Date Of Birth          1995        Visit Information        Provider Department      11/20/2018 8:00 AM Jyoti Leahy MD [facility]        Today's Diagnoses     Generalized anxiety disorder    -  1    Intractable chronic migraine without aura and without status migrainosus           Follow-ups after your visit        Your next 10 appointments already scheduled     Nov 27, 2018  2:45 PM CST   (Arrive by 2:30 PM)   SHORT with Bisi Muro MD   [facility]    [address]  [place] [address]   [phone]              Who to contact     If you have questions or need follow up information about today's clinic visit or your schedule please contact [facility] [place] directly at [phone].  Normal or non-critical lab and imaging results will be communicated to you by MyChart, letter or phone within 4 business days after the clinic has received the results. If you do not hear from us within 7 days, please contact the clinic through MyChart or phone. If you have a critical or abnormal lab result, we will notify you by phone as soon as possible.  Submit refill requests through [employer] or call your pharmacy and they will forward the refill request to us. Please allow 3 business days for your refill to be completed.          Additional Information About Your Visit        Care EveryWhere ID     This is your Care EveryWhere ID. This could be used by other organizations to access your [place] medical records  [id number]        Your Vitals Were     Pulse Temperature BMI (Body Mass Index)             83 96.7  F (35.9  C) (Tympanic) 31.47 kg/m2          Blood Pressure from Last 3 Encounters:   11/20/18 116/78   10/18/18 114/72   10/09/18 108/66    Weight from Last 3 Encounters:   11/20/18 88.5 kg (195  lb)   10/18/18 88.5 kg (195 lb)   10/09/18 88.9 kg (196 lb)              Today, you had the following     No orders found for display         Today's Medication Changes          These changes are accurate as of 11/20/18  8:32 AM.  If you have any questions, ask your nurse or doctor.               Start taking these medicines.        Dose/Directions    escitalopram 10 MG tablet   Commonly known as:  LEXAPRO   Used for:  Generalized anxiety disorder   Started by:  Jyoti Leahy MD        Take 1/2 tablet daily for one week then increase to 1 tablet daily   Quantity:  30 tablet   Refills:  3       hydrOXYzine 25 MG capsule   Commonly known as:  VISTARIL   Used for:  Generalized anxiety disorder   Started by:  Jyoti Leahy MD        Take 1-2 capsules up to 3 times daily as needed for anxiety   Quantity:  120 capsule   Refills:  3         These medicines have changed or have updated prescriptions.        Dose/Directions    topiramate 25 MG tablet   Commonly known as:  TOPAMAX   This may have changed:    - how much to take  - how to take this  - when to take this  - additional instructions   Used for:  Intractable chronic migraine without aura and without status migrainosus   Changed by:  Jyoti Leahy MD        Dose:  25 mg   Take 1 tablet (25 mg) by mouth 2 times daily   Quantity:  60 tablet   Refills:  3         Stop taking these medicines if you haven't already. Please contact your care team if you have questions.     hydrOXYzine 10 MG tablet   Commonly known as:  ATARAX   Stopped by:  Jyoti Leahy MD           propranolol 10 MG tablet   Commonly known as:  INDERAL   Stopped by:  Jyoti Leahy MD                Where to get your medicines      These medications were sent to [facility] - [place], [place] - [address] AT [facility] OF  [address], [place] [address]     Phone:  [phone]     escitalopram 10 MG tablet    hydrOXYzine 25 MG capsule    topiramate 25 MG  tablet                Primary Care Provider Office Phone # Fax #    Bisi Muro [phone] [address] [facility] [address]        Equal Access to Services     JESUS ALBERTORAÚL LUANNE : Titus espinoza shonda Craig, waaxda luqadaha, qaybta kaalmada albino, rafita berry laNemochioma barriga. So [facility] [phone].    ATENCIÓN: Si habla español, tiene a yao disposición servicios gratuitos de asistencia lingüística. Llame al [phone].    We comply with applicable federal civil rights laws and [place] laws. We do not discriminate on the basis of race, color, national origin, age, disability, sex, sexual orientation, or gender identity.            Thank you!     Thank you for choosing [facility]  for your care. Our goal is always to provide you with excellent care. Hearing back from our patients is one way we can continue to improve our services. Please take a few minutes to complete the written survey that you may receive in the mail after your visit with us. Thank you!             Your Updated Medication List - Protect others around you: Learn how to safely use, store and throw away your medicines at www.disposemymeds.org.          This list is accurate as of 11/20/18  8:32 AM.  Always use your most recent med list.                   Brand Name Dispense Instructions for use Diagnosis    clonazePAM 2 MG tablet    klonoPIN    30 tablet    TAKE ONE TABLET BY MOUTH EVERY DAY AS NEEDED FOR ANXIETY    Anxiety       escitalopram 10 MG tablet    LEXAPRO    30 tablet    Take 1/2 tablet daily for one week then increase to 1 tablet daily    Generalized anxiety disorder       hydrOXYzine 25 MG capsule    VISTARIL    120 capsule    Take 1-2 capsules up to 3 times daily as needed for anxiety    Generalized anxiety disorder       ISIBLOOM PO      Take by mouth daily        nabumetone 500 MG tablet    RELAFEN    60 tablet    Take 1-2 tablets (500-1,000 mg) by mouth 2 times  daily as needed for moderate pain    Acute right-sided low back pain with right-sided sciatica       omeprazole 40 MG capsule    priLOSEC    30 capsule    Take 1 capsule (40 mg) by mouth daily Take 30-60 minutes before a meal.    Intractable vomiting with nausea, unspecified vomiting type       rizatriptan 10 MG ODT tab    MAXALT-MLT    6 tablet    Take 1 tablet (10 mg) by mouth at onset of headache for migraine May repeat in 2 hours. Max 3 tablets/24 hours.    Intractable chronic migraine without aura and without status migrainosus       SUMAtriptan 100 MG tablet    IMITREX    9 tablet    Take 1 tablet (100 mg) by mouth at onset of headache for migraine May repeat in 2 hours if needed: max 2/day; average number of headaches monthly    Intractable chronic migraine without aura and without status migrainosus       topiramate 25 MG tablet    TOPAMAX    60 tablet    Take 1 tablet (25 mg) by mouth 2 times daily    Intractable chronic migraine without aura and without status migrainosus       triamcinolone 0.1 % paste    KENALOG    5 g    Take by mouth 3 times daily    Aphthous ulcer       TYLENOL PO      Take 1,000 mg by mouth every 6 hours as needed for mild pain or fever        valACYclovir 500 MG tablet    VALTREX    6 tablet    Take 1 tablet (500 mg) by mouth 2 times daily    HSV (herpes simplex virus) infection

## 2018-11-21 ASSESSMENT — ANXIETY QUESTIONNAIRES: GAD7 TOTAL SCORE: 18

## 2018-11-28 DIAGNOSIS — B00.9 HSV (HERPES SIMPLEX VIRUS) INFECTION: ICD-10-CM

## 2018-11-28 NOTE — TELEPHONE ENCOUNTER
valACYclovir (VALTREX) 500 MG tablet    Last Written Prescription Date:  historical medication   Last Fill Quantity: 0,   # refills: 0  Last Office Visit: 10/18/18  Future Office visit:       Routing refill request to provider for review/approval because:  Medication is reported/historical

## 2018-11-29 RX ORDER — VALACYCLOVIR HYDROCHLORIDE 500 MG/1
500 TABLET, FILM COATED ORAL 2 TIMES DAILY
Qty: 6 TABLET | Refills: 3 | Status: SHIPPED | OUTPATIENT
Start: 2018-11-29 | End: 2019-01-03

## 2018-12-11 DIAGNOSIS — F41.9 ANXIETY: ICD-10-CM

## 2018-12-11 NOTE — TELEPHONE ENCOUNTER
clonazePAM (KLONOPIN) 2 MG tablet  Last Written Prescription Date:  7/27/18  Last Fill Quantity: 30,   # refills: 3  Last Office Visit: 11/27/18  Future Office visit:       Routing refill request to provider for review/approval because:  Drug not on the [facility], [facility] or [facility] refill protocol or controlled substance

## 2018-12-14 RX ORDER — CLONAZEPAM 2 MG/1
TABLET ORAL
Qty: 30 TABLET | Refills: 3 | Status: SHIPPED | OUTPATIENT
Start: 2018-12-14 | End: 2019-04-08

## 2018-12-26 ENCOUNTER — APPOINTMENT (OUTPATIENT)
Dept: GENERAL RADIOLOGY | Facility: HOSPITAL | Age: 23
End: 2018-12-26
Attending: PHYSICIAN ASSISTANT
Payer: COMMERCIAL

## 2018-12-26 ENCOUNTER — HOSPITAL ENCOUNTER (EMERGENCY)
Facility: HOSPITAL | Age: 23
Discharge: HOME | End: 2018-12-26
Attending: NURSE PRACTITIONER | Admitting: NURSE PRACTITIONER
Payer: COMMERCIAL

## 2018-12-26 VITALS
SYSTOLIC BLOOD PRESSURE: 117 MMHG | RESPIRATION RATE: 18 BRPM | DIASTOLIC BLOOD PRESSURE: 71 MMHG | HEART RATE: 83 BPM | WEIGHT: 190 LBS | TEMPERATURE: 98.5 F | OXYGEN SATURATION: 100 % | BODY MASS INDEX: 30.67 KG/M2
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
## 2018-12-26 DIAGNOSIS — M25.561 ACUTE PAIN OF RIGHT KNEE: ICD-10-CM

## 2018-12-26 DIAGNOSIS — M25.461 EFFUSION OF RIGHT KNEE: ICD-10-CM

## 2018-12-26 PROCEDURE — G0463 HOSPITAL OUTPT CLINIC VISIT: HCPCS

## 2018-12-26 PROCEDURE — 73562 X-RAY EXAM OF KNEE 3: CPT | Mod: TC,RT

## 2018-12-26 PROCEDURE — 99213 OFFICE O/P EST LOW 20 MIN: CPT | Mod: Z6 | Performed by: NURSE PRACTITIONER

## 2018-12-26 RX ORDER — TRAMADOL HYDROCHLORIDE 50 MG/1
50-100 TABLET ORAL EVERY 6 HOURS PRN
Qty: 8 TABLET | Refills: 0 | Status: SHIPPED | OUTPATIENT
Start: 2018-12-26 | End: 2019-03-19

## 2018-12-26 ASSESSMENT — ENCOUNTER SYMPTOMS
FEVER: 0
FATIGUE: 0
APPETITE CHANGE: 0
CHILLS: 0
COLOR CHANGE: 1
ARTHRALGIAS: 1

## 2018-12-26 NOTE — ED PROVIDER NOTES
"  History     Chief Complaint   Patient presents with     Knee Pain     \"had a fall about 2 hrs ago, did not hit head, denies neck pain, did not loose consciousness, injured right knee\"     HPI  Valorie Awad is a 23 year old female who presents today for an evaluation of her right knee.  She slipped and fell today outside landing on the outside of the right knee.  The fall occurred at about 2:30 today.  She denies any other injury, did not hit her head or neck.   She has been ambulatory with limp since the fall.  She took ibuprofen 800 mg for pain without relief.  She has also been icing.      No history of right knee injury or surgery in the past.   No numbness and tingling.      Problem List:    Patient Active Problem List    Diagnosis Date Noted     Panic disorder without agoraphobia 10/18/2018     Priority: Medium     Moderate episode of recurrent major depressive disorder (H) 10/18/2018     Priority: Medium     ACP (advance care planning) 03/22/2017     Priority: Medium     Advance Care Planning 7/6/2017: ACP Review of Chart / Resources Provided:  Reviewed chart for advance care plan.  Valorie Awad has no plan or code status on file. Discussed available resources and provided with information.   Added by KARLY CORBETT                 Spasm of muscle 08/12/2015     Priority: Medium     Migraine 10/10/2007     Priority: Medium     Problem list name updated by automated process. Provider to review       Anxiety state 09/20/2001     Priority: Medium     Problem list name updated by automated process. Provider to review          Past Medical History:    Past Medical History:   Diagnosis Date     Abnormal Pap smear      Anxiety 9/20/2001     Marijuana use      Migraine headaches 10/10/2007     Supervision of other normal pregnancy        Past Surgical History:    Past Surgical History:   Procedure Laterality Date     Comminuted fracture elbow Left 10/25/2013    lauren placed     CYSTOSCOPY      bladder distention "     TONSILLECTOMY         Family History:    Family History   Problem Relation Age of Onset     Other - See Comments Father         alcoholism     Depression Father      Mental Illness Father      Depression Mother      Irritable Bowel Syndrome Mother      Mental Illness Mother      Other - See Comments Mother         migraine headache     Other - See Comments Maternal Grandmother         blood disease     Lipids Maternal Grandmother         hyperlipidemia     Thyroid Disease Maternal Grandmother      Diabetes No family hx of      Asthma No family hx of        Social History:  Marital Status:  Single [1]  Social History     Tobacco Use     Smoking status: Never Smoker     Smokeless tobacco: Never Used   Substance Use Topics     Alcohol use: Yes     Alcohol/week: 0.0 oz     Comment: social     Drug use: No        Medications:      Acetaminophen (TYLENOL PO)   clonazePAM (KLONOPIN) 2 MG tablet   Desogestrel-Ethinyl Estradiol (ISIBLOOM PO)   escitalopram (LEXAPRO) 10 MG tablet   hydrOXYzine (VISTARIL) 25 MG capsule   nabumetone (RELAFEN) 500 MG tablet   omeprazole (PRILOSEC) 40 MG capsule   rizatriptan (MAXALT-MLT) 10 MG ODT tab   SUMAtriptan (IMITREX) 100 MG tablet   topiramate (TOPAMAX) 25 MG tablet   traMADol (ULTRAM) 50 MG tablet   triamcinolone (KENALOG) 0.1 % paste   valACYclovir (VALTREX) 500 MG tablet   hydrOXYzine (ATARAX) 10 MG tablet   ISIBLOOM 0.15-30 MG-MCG tablet   propranolol (INDERAL) 10 MG tablet   traMADol (ULTRAM) 50 MG tablet         Review of Systems   Constitutional: Negative for appetite change, chills, fatigue and fever.   Musculoskeletal: Positive for arthralgias.   Skin: Positive for color change.       Physical Exam   BP: 117/71  Pulse: 83  Temp: 98.5  F (36.9  C)  Resp: 18  Weight: 86.2 kg (190 lb)  SpO2: 100 %      Physical Exam   Constitutional: She appears well-developed.   HENT:   Head: Normocephalic and atraumatic.   Cardiovascular: Normal rate.   Pulmonary/Chest: Effort normal.    Musculoskeletal:        Right knee: She exhibits decreased range of motion, ecchymosis (medial knee) and bony tenderness. She exhibits no swelling, no deformity, no laceration, no erythema, normal alignment, no LCL laxity and no MCL laxity. Tenderness found. Medial joint line, lateral joint line and patellar tendon tenderness noted.   Nursing note and vitals reviewed.      ED Course        Procedures    Results for orders placed or performed during the hospital encounter of 12/26/18   XR Knee Right 3 Views    Narrative    Exam: XR KNEE RT 3 VW     History:Female, age 23 years, pain    Comparison:  None    Technique: Three views are submitted.    Findings: Bones are normally mineralized. No evidence of acute or  subacute fracture.  No evidence of dislocation.           Impression    Impression:  No evidence of acute or subacute bony abnormality.     Small suprapatellar joint effusion.    CA NARAYAN MD       Assessments & Plan (with Medical Decision Making)     I have reviewed the nursing notes.    I have reviewed the findings, diagnosis, plan and need for follow up with the patient.  ASSESSMENT / PLAN:  (M25.461) Effusion of right knee  Comment: small effusion  Plan:  Rest, ice 20 minutes at least 4 times daily for the first 48 hours, then ice and/or heat as needed for symptomatic relief   Elevate affected area as much as possible   OTC Motrin and or Tylenol as needed for pain relief   ACE wrap for comfort/support, crutches for mobility, may do touch down weight bearing   Follow up with PCP in 1-2 weeks if symptoms are not improving, sooner if symptoms are worsening    (W01.0XXA) Fall from slip, trip, or stumble, initial encounter    (M25.561) Acute pain of right knee  Comment: n/v intatc  Plan:  See plan above         Medication List      Started    traMADol 50 MG tablet  Commonly known as:  ULTRAM   mg, Oral, EVERY 6 HOURS PRN            Final diagnoses:   Effusion of right knee   Fall from slip,  trip, or stumble, initial encounter   Acute pain of right knee       12/26/2018   [facility]     Shayna Zhou NP  12/28/18 0982

## 2018-12-26 NOTE — ED TRIAGE NOTES
Pt here alone. She is complaining of right knee pain. She states she fell and twisted her knee outside today. Ambulates with a limp.

## 2018-12-26 NOTE — ED AVS SNAPSHOT
[facility]  [address] [address]  Phone:  [phone]                                    Valorie Awad   MRN: [id number]    Department:  [facility]   Date of Visit:  12/26/2018           After Visit Summary Signature Page    I have received my discharge instructions, and my questions have been answered. I have discussed any challenges I see with this plan with the nurse or doctor.    ..........................................................................................................................................  Patient/Patient Representative Signature      ..........................................................................................................................................  Patient Representative Print Name and Relationship to Patient    ..................................................               ................................................  Date                                   Time    ..........................................................................................................................................  Reviewed by Signature/Title    ...................................................              ..............................................  Date                                               Time          22EPIC Rev 08/18

## 2018-12-27 ENCOUNTER — OFFICE VISIT (OUTPATIENT)
Dept: FAMILY MEDICINE | Facility: OTHER | Age: 23
End: 2018-12-27
Attending: NURSE PRACTITIONER
Payer: COMMERCIAL

## 2018-12-27 VITALS
SYSTOLIC BLOOD PRESSURE: 104 MMHG | DIASTOLIC BLOOD PRESSURE: 68 MMHG | HEART RATE: 82 BPM | TEMPERATURE: 98.1 F | OXYGEN SATURATION: 99 %

## 2018-12-27 DIAGNOSIS — M25.561 ACUTE PAIN OF RIGHT KNEE: Primary | ICD-10-CM

## 2018-12-27 PROCEDURE — G0463 HOSPITAL OUTPT CLINIC VISIT: HCPCS

## 2018-12-27 PROCEDURE — 99213 OFFICE O/P EST LOW 20 MIN: CPT | Performed by: NURSE PRACTITIONER

## 2018-12-27 RX ORDER — DESOGESTREL AND ETHINYL ESTRADIOL 0.15-0.03
KIT ORAL
Refills: 4 | COMMUNITY
Start: 2018-12-12 | End: 2019-03-19

## 2018-12-27 RX ORDER — HYDROXYZINE HYDROCHLORIDE 10 MG/1
TABLET, FILM COATED ORAL
Refills: 2 | COMMUNITY
Start: 2018-11-04 | End: 2019-01-03

## 2018-12-27 RX ORDER — TRAMADOL HYDROCHLORIDE 50 MG/1
50 TABLET ORAL EVERY 6 HOURS PRN
Qty: 10 TABLET | Refills: 0 | Status: SHIPPED | OUTPATIENT
Start: 2018-12-27 | End: 2019-03-19

## 2018-12-27 RX ORDER — PROPRANOLOL HYDROCHLORIDE 10 MG/1
TABLET ORAL
Refills: 3 | COMMUNITY
Start: 2018-11-04 | End: 2019-01-03

## 2018-12-27 ASSESSMENT — ANXIETY QUESTIONNAIRES
5. BEING SO RESTLESS THAT IT IS HARD TO SIT STILL: SEVERAL DAYS
4. TROUBLE RELAXING: SEVERAL DAYS
3. WORRYING TOO MUCH ABOUT DIFFERENT THINGS: NEARLY EVERY DAY
IF YOU CHECKED OFF ANY PROBLEMS ON THIS QUESTIONNAIRE, HOW DIFFICULT HAVE THESE PROBLEMS MADE IT FOR YOU TO DO YOUR WORK, TAKE CARE OF THINGS AT HOME, OR GET ALONG WITH OTHER PEOPLE: SOMEWHAT DIFFICULT
GAD7 TOTAL SCORE: 16
6. BECOMING EASILY ANNOYED OR IRRITABLE: NEARLY EVERY DAY
2. NOT BEING ABLE TO STOP OR CONTROL WORRYING: NEARLY EVERY DAY
7. FEELING AFRAID AS IF SOMETHING AWFUL MIGHT HAPPEN: MORE THAN HALF THE DAYS
1. FEELING NERVOUS, ANXIOUS, OR ON EDGE: NEARLY EVERY DAY

## 2018-12-27 ASSESSMENT — PAIN SCALES - GENERAL: PAINLEVEL: WORST PAIN (10)

## 2018-12-27 ASSESSMENT — PATIENT HEALTH QUESTIONNAIRE - PHQ9: SUM OF ALL RESPONSES TO PHQ QUESTIONS 1-9: 15

## 2018-12-27 NOTE — PATIENT INSTRUCTIONS
Patient Education     Reducing Knee Pain and Swelling    Many treatments can help reduce pain and swelling in your knee. Your healthcare provider or physical therapist may suggest one or more of the following treatments:    Icing your knee. This helps reduce swelling. You may be asked to ice your knee once a day or more. Apply ice for about 15 to 20 minutes at a time, with at least 40 minutes between sessions. To make an ice pack, put ice cubes in a plastic bag that seals at the top. Wrap the bag in a clean, thin towel or cloth. Never put ice or an ice pack directly on the skin.    Keeping your leg raised above your heart. This helps excess fluid flow out of your knee joint to reduce swelling.    Compression. This means wrapping an elastic bandage or neoprene sleeve snugly around your knees. It keeps fluid from collecting in and around your knee joint.    Electrical stimulation. This is done by a physical therapist or . It can help reduce excess fluid in your knee joint.    Anti-inflammatory medicines. These may be prescribed by your healthcare provider. You may take pills or get shots (injections) in your knee.    Isometric (josue) exercises. These strengthen the muscles that support your knee joint. They also help reduce excess fluid in your knee.    Massage. This helps fluid drain away from your knee.  Date Last Reviewed: 5/1/2018 2000-2018 [employer]. [address] [place] [address], [place] [address]. All rights reserved. This information is not intended as a substitute for professional medical care. Always follow your healthcare professional's instructions.

## 2018-12-27 NOTE — NURSING NOTE
"Chief Complaint   Patient presents with     Knee Pain       Initial /68   Pulse 82   Temp 98.1  F (36.7  C) (Tympanic)   SpO2 99%  Estimated body mass index is 30.67 kg/m  as calculated from the following:    Height as of 10/18/18: 1.676 m (5' 6\").    Weight as of 12/26/18: 86.2 kg (190 lb).  Medication Reconciliation: complete    Skye Bell MA    "

## 2018-12-27 NOTE — PROGRESS NOTES
SUBJECTIVE:   Valorie Awad is a 23 year old female who presents to clinic today for the following health issues:      Musculoskeletal problem/pain      Duration:Fall    Description  Location: right knee    Intensity:  severe    Accompanying signs and symptoms: swelling    History  Previous similar problem: no   Previous evaluation:  x-ray    Precipitating or alleviating factors:  Trauma or overuse: YES- fallen on ice  Aggravating factors include: standing, walking, climbing stairs, lifting and overuse    Therapies tried and outcome: immobilization and tramadol not helping.          Problem list and histories reviewed & adjusted, as indicated.  Additional history: as documented    Patient Active Problem List   Diagnosis     Anxiety state     Migraine     Spasm of muscle     ACP (advance care planning)     Panic disorder without agoraphobia     Moderate episode of recurrent major depressive disorder (H)     Past Surgical History:   Procedure Laterality Date     Comminuted fracture elbow Left 10/25/2013    lauren placed     CYSTOSCOPY      bladder distention     TONSILLECTOMY         Social History     Tobacco Use     Smoking status: Never Smoker     Smokeless tobacco: Never Used   Substance Use Topics     Alcohol use: Yes     Alcohol/week: 0.0 oz     Comment: social     Family History   Problem Relation Age of Onset     Other - See Comments Father         alcoholism     Depression Father      Mental Illness Father      Depression Mother      Irritable Bowel Syndrome Mother      Mental Illness Mother      Other - See Comments Mother         migraine headache     Other - See Comments Maternal Grandmother         blood disease     Lipids Maternal Grandmother         hyperlipidemia     Thyroid Disease Maternal Grandmother      Diabetes No family hx of      Asthma No family hx of          Current Outpatient Medications   Medication Sig Dispense Refill     Acetaminophen (TYLENOL PO) Take 1,000 mg by mouth every 6 hours as  needed for mild pain or fever       clonazePAM (KLONOPIN) 2 MG tablet TAKE ONE TABLET BY MOUTH EVERY DAY AS NEEDED FOR ANXIETY 30 tablet 3     Desogestrel-Ethinyl Estradiol (ISIBLOOM PO) Take by mouth daily       escitalopram (LEXAPRO) 10 MG tablet Take 1/2 tablet daily for one week then increase to 1 tablet daily 30 tablet 3     hydrOXYzine (ATARAX) 10 MG tablet   2     hydrOXYzine (VISTARIL) 25 MG capsule Take 1-2 capsules up to 3 times daily as needed for anxiety 120 capsule 3     ISIBLOOM 0.15-30 MG-MCG tablet TK 1 T PO D  4     nabumetone (RELAFEN) 500 MG tablet Take 1-2 tablets (500-1,000 mg) by mouth 2 times daily as needed for moderate pain 60 tablet 1     omeprazole (PRILOSEC) 40 MG capsule Take 1 capsule (40 mg) by mouth daily Take 30-60 minutes before a meal. 30 capsule 3     propranolol (INDERAL) 10 MG tablet   3     rizatriptan (MAXALT-MLT) 10 MG ODT tab Take 1 tablet (10 mg) by mouth at onset of headache for migraine May repeat in 2 hours. Max 3 tablets/24 hours. 6 tablet 3     SUMAtriptan (IMITREX) 100 MG tablet Take 1 tablet (100 mg) by mouth at onset of headache for migraine May repeat in 2 hours if needed: max 2/day; average number of headaches monthly 9 tablet 3     topiramate (TOPAMAX) 25 MG tablet Take 1 tablet (25 mg) by mouth 2 times daily 60 tablet 3     traMADol (ULTRAM) 50 MG tablet Take 1-2 tablets ( mg) by mouth every 6 hours as needed for severe pain 8 tablet 0     triamcinolone (KENALOG) 0.1 % paste Take by mouth 3 times daily 5 g 0     valACYclovir (VALTREX) 500 MG tablet Take 1 tablet (500 mg) by mouth 2 times daily 6 tablet 3     Allergies   Allergen Reactions     Bactrim [Sulfamethoxazole W-Trimethoprim] Rash     Morphine Hcl Nausea and Vomiting     Clindamycin Other (See Comments)     Thrush       Nitrofurantoin      Macrobid      Nitrofurantoin Monohydrate Macrocrystals      Macrobid      Loracarbef Rash     Lorabid       Reviewed and updated as needed this visit by clinical  staff       Reviewed and updated as needed this visit by Provider         ROS:  CONSTITUTIONAL:NEGATIVE for fever, chills, change in weight  INTEGUMENTARY/SKIN: bruising and swelling to the right knee  RESP: NEGATIVE for significant cough or SOB  CV: NEGATIVE for chest pain, palpitations or peripheral edema  MUSCULOSKELETAL: extreme pain to the right knee  NEURO: unable to bear weight today due to pain on the right knee    OBJECTIVE:     /68   Pulse 82   Temp 98.1  F (36.7  C) (Tympanic)   SpO2 99%   There is no height or weight on file to calculate BMI.   GENERAL: healthy, alert and no distress  RESP: non labored regular breathing  CV: regular rates and rhythm, peripheral pulses strong and no peripheral edema  MS: decreased range of motion right knee, slight edema to right knee, peripheral pulses normal and tenderness to palpation right knee  SKIN: bruising right medial knee    Diagnostic Test Results:  Results for orders placed or performed during the hospital encounter of 12/26/18   XR Knee Right 3 Views    Narrative    Exam: XR KNEE RT 3 VW     History:Female, age 23 years, pain    Comparison:  None    Technique: Three views are submitted.    Findings: Bones are normally mineralized. No evidence of acute or  subacute fracture.  No evidence of dislocation.           Impression    Impression:  No evidence of acute or subacute bony abnormality.     Small suprapatellar joint effusion.    CA NARAYAN MD       ASSESSMENT/PLAN:     1. Acute pain of right knee  Valorie is not happing she cannot have anything stronger for pain. She states the Tramadol is not helping. Discussed with Valorie that she is going to have pain and it will take some time to heal.  Discussed RICE with rest, ice, compression, and elevation. She did agree to a knee immobilizer. She is using crutches. Offered her referral to orthopedics, but she states she is going to [place] tomorrow. Did provide her with a refill of the tramadol limited  prescription. She is encouraged to use tylenol and ibuprofen for comfort. She is encouraged to follow up as needed.   - traMADol (ULTRAM) 50 MG tablet; Take 1 tablet (50 mg) by mouth every 6 hours as needed for severe pain  Dispense: 10 tablet; Refill: 0        See Patient Instructions    CARLOS Feliz [facility] - [place]

## 2018-12-29 ASSESSMENT — ANXIETY QUESTIONNAIRES: GAD7 TOTAL SCORE: 16

## 2019-01-03 ENCOUNTER — OFFICE VISIT (OUTPATIENT)
Dept: FAMILY MEDICINE | Facility: OTHER | Age: 24
End: 2019-01-03
Attending: PHYSICIAN ASSISTANT
Payer: COMMERCIAL

## 2019-01-03 VITALS
SYSTOLIC BLOOD PRESSURE: 110 MMHG | DIASTOLIC BLOOD PRESSURE: 66 MMHG | HEART RATE: 83 BPM | TEMPERATURE: 98.8 F | RESPIRATION RATE: 20 BRPM | OXYGEN SATURATION: 99 %

## 2019-01-03 DIAGNOSIS — M25.562 ACUTE PAIN OF LEFT KNEE: Primary | ICD-10-CM

## 2019-01-03 PROCEDURE — 99213 OFFICE O/P EST LOW 20 MIN: CPT | Performed by: PHYSICIAN ASSISTANT

## 2019-01-03 PROCEDURE — G0463 HOSPITAL OUTPT CLINIC VISIT: HCPCS

## 2019-01-03 RX ORDER — NABUMETONE 500 MG/1
500-1000 TABLET, FILM COATED ORAL 2 TIMES DAILY PRN
Qty: 60 TABLET | Refills: 1 | Status: SHIPPED | OUTPATIENT
Start: 2019-01-03 | End: 2019-08-27

## 2019-01-03 RX ORDER — HYDROXYZINE PAMOATE 25 MG/1
25-50 CAPSULE ORAL 3 TIMES DAILY PRN
COMMUNITY
End: 2020-02-11

## 2019-01-03 ASSESSMENT — ANXIETY QUESTIONNAIRES
GAD7 TOTAL SCORE: 17
5. BEING SO RESTLESS THAT IT IS HARD TO SIT STILL: SEVERAL DAYS
3. WORRYING TOO MUCH ABOUT DIFFERENT THINGS: NEARLY EVERY DAY
6. BECOMING EASILY ANNOYED OR IRRITABLE: NEARLY EVERY DAY
IF YOU CHECKED OFF ANY PROBLEMS ON THIS QUESTIONNAIRE, HOW DIFFICULT HAVE THESE PROBLEMS MADE IT FOR YOU TO DO YOUR WORK, TAKE CARE OF THINGS AT HOME, OR GET ALONG WITH OTHER PEOPLE: VERY DIFFICULT
1. FEELING NERVOUS, ANXIOUS, OR ON EDGE: NEARLY EVERY DAY
7. FEELING AFRAID AS IF SOMETHING AWFUL MIGHT HAPPEN: SEVERAL DAYS
2. NOT BEING ABLE TO STOP OR CONTROL WORRYING: NEARLY EVERY DAY
4. TROUBLE RELAXING: NEARLY EVERY DAY

## 2019-01-03 ASSESSMENT — PATIENT HEALTH QUESTIONNAIRE - PHQ9: SUM OF ALL RESPONSES TO PHQ QUESTIONS 1-9: 15

## 2019-01-03 ASSESSMENT — PAIN SCALES - GENERAL: PAINLEVEL: WORST PAIN (10)

## 2019-01-03 NOTE — NURSING NOTE
"Chief Complaint   Patient presents with     Musculoskeletal Problem       Initial /66 (BP Location: Left arm, Patient Position: Sitting, Cuff Size: Adult Regular)   Pulse 83   Temp 98.8  F (37.1  C) (Tympanic)   Resp 20   SpO2 99%  Estimated body mass index is 30.67 kg/m  as calculated from the following:    Height as of 10/18/18: 1.676 m (5' 6\").    Weight as of 12/26/18: 86.2 kg (190 lb).  Medication Reconciliation: complete    Urszula Morrow LPN  "

## 2019-01-03 NOTE — PROGRESS NOTES
SUBJECTIVE:   Valorie Awad is a 23 year old female who presents to clinic today for the following health issues:      Musculoskeletal problem/pain      Duration: over 1 week    Description  Location: right knee    Intensity:  10/10    Accompanying signs and symptoms: radiation of pain to lower leg, weakness of right leg and swelling    History  Previous similar problem: YES  Previous evaluation:  x-ray    Precipitating or alleviating factors:  Trauma or overuse: YES  Aggravating factors include: standing, walking, climbing stairs and overuse  Therapies tried and outcome: rest/inactivity, ice, support wrap, acetaminophen and Ibuprofen, not improving        Problem list and histories reviewed & adjusted, as indicated.  Additional history: as documented    Patient Active Problem List   Diagnosis     Anxiety state     Migraine     Spasm of muscle     ACP (advance care planning)     Panic disorder without agoraphobia     Moderate episode of recurrent major depressive disorder (H)     Past Surgical History:   Procedure Laterality Date     Comminuted fracture elbow Left 10/25/2013    lauren placed     CYSTOSCOPY      bladder distention     TONSILLECTOMY         Social History     Tobacco Use     Smoking status: Never Smoker     Smokeless tobacco: Never Used   Substance Use Topics     Alcohol use: Yes     Alcohol/week: 0.0 oz     Comment: social     Family History   Problem Relation Age of Onset     Other - See Comments Father         alcoholism     Depression Father      Mental Illness Father      Depression Mother      Irritable Bowel Syndrome Mother      Mental Illness Mother      Other - See Comments Mother         migraine headache     Other - See Comments Maternal Grandmother         blood disease     Lipids Maternal Grandmother         hyperlipidemia     Thyroid Disease Maternal Grandmother      Diabetes No family hx of      Asthma No family hx of          Current Outpatient Medications   Medication Sig Dispense  Refill     Acetaminophen (TYLENOL PO) Take 1,000 mg by mouth every 6 hours as needed for mild pain or fever       clonazePAM (KLONOPIN) 2 MG tablet TAKE ONE TABLET BY MOUTH EVERY DAY AS NEEDED FOR ANXIETY 30 tablet 3     Desogestrel-Ethinyl Estradiol (ISIBLOOM PO) Take by mouth daily       escitalopram (LEXAPRO) 10 MG tablet Take 1/2 tablet daily for one week then increase to 1 tablet daily 30 tablet 3     hydrOXYzine (VISTARIL) 25 MG capsule Take 25-50 mg by mouth 3 times daily as needed for itching       ISIBLOOM 0.15-30 MG-MCG tablet TK 1 T PO D  4     nabumetone (RELAFEN) 500 MG tablet Take 1-2 tablets (500-1,000 mg) by mouth 2 times daily as needed for moderate pain 60 tablet 1     rizatriptan (MAXALT-MLT) 10 MG ODT tab Take 1 tablet (10 mg) by mouth at onset of headache for migraine May repeat in 2 hours. Max 3 tablets/24 hours. 6 tablet 3     SUMAtriptan (IMITREX) 100 MG tablet Take 1 tablet (100 mg) by mouth at onset of headache for migraine May repeat in 2 hours if needed: max 2/day; average number of headaches monthly 9 tablet 3     topiramate (TOPAMAX) 25 MG tablet Take 1 tablet (25 mg) by mouth 2 times daily 60 tablet 3     traMADol (ULTRAM) 50 MG tablet Take 1-2 tablets ( mg) by mouth every 6 hours as needed for severe pain 8 tablet 0     triamcinolone (KENALOG) 0.1 % paste Take by mouth 3 times daily 5 g 0     Allergies   Allergen Reactions     Bactrim [Sulfamethoxazole W-Trimethoprim] Rash     Morphine Hcl Nausea and Vomiting     Clindamycin Other (See Comments)     Thrush       Nitrofurantoin      Macrobid      Nitrofurantoin Monohydrate Macrocrystals      Macrobid      Loracarbef Rash     Lorabid     Recent Labs   Lab Test 10/18/18  1507 07/19/18  1426 09/12/14  1326  10/25/13  2217   ALT  --  32 21  --  28   CR  --  0.66 0.81  --  0.75   GFRESTIMATED  --  >90 90  --  >90   GFRESTBLACK  --  >90 >90  African American GFR Calc    --  >90   POTASSIUM  --  3.9 3.9  --  3.5   TSH 0.60  --  1.23   <  >  --     < > = values in this interval not displayed.      BP Readings from Last 3 Encounters:   01/03/19 110/66   12/27/18 104/68   12/26/18 117/71    Wt Readings from Last 3 Encounters:   12/26/18 86.2 kg (190 lb)   11/20/18 88.5 kg (195 lb)   10/18/18 88.5 kg (195 lb)                    Reviewed and updated as needed this visit by clinical staff       Reviewed and updated as needed this visit by Provider         ROS:  Constitutional, HEENT, cardiovascular, pulmonary, gi and gu systems are negative, except as otherwise noted.    OBJECTIVE:                                                    /66 (BP Location: Left arm, Patient Position: Sitting, Cuff Size: Adult Regular)   Pulse 83   Temp 98.8  F (37.1  C) (Tympanic)   Resp 20   SpO2 99%   There is no height or weight on file to calculate BMI.  GENERAL APPEARANCE: healthy, alert and no distress  MS: has swelling and mild bruising on her knee cap and medially knee. Has ankle swelling appreicated but had on ACE.having trouble standing pain is 10/10.   SKIN: mild bruising. Appreciated ankle swelling without discoloration. No warmth of skin.   NEURO: Normal strength and tone, mentation intact and speech normal  PSYCH: mentation appears normal and affect normal/bright    Diagnostic test results:  Diagnostic Test Results:  No results found for this or any previous visit (from the past 24 hour(s)).     ASSESSMENT/PLAN:                                                    1. Acute pain of left knee  Fall over a week ago. Pain is 10/10. Negative xray. Full extension and flexion. Mild bruise. Bursal pain is noted. PT and compression hose for ankle swelling.   - PHYSICAL THERAPY REFERRAL      See Patient Instructions    SONIA Laura  [facility] - [place]

## 2019-01-03 NOTE — PATIENT INSTRUCTIONS
Knee Pain  What causes knee pain?   The knee functions as a hinge and a shock absorber during walking, running, jumping, kicking, and climbing. Ligaments, tendons, and muscles give the knee stability and hold it together. Because the joint is weak, the knee is at risk for many types of injuries.   The most common causes of knee pain are sprains, overuse injuries, cartilage tears, and arthritis.   A knee sprain is an injury that causes a stretch or tear in a ligament. A ligament is a strong band of tissue connecting one bone to the other. Knee sprains can occur by wrenching or twisting or by a violent blow. Ligaments may tear slightly, or completely pull away from the bone.   Cartilage is a rubbery tissue that cushions your joints. A tear in the knee cartilage can occur from a sudden move or twist when there is weight on the knee. Long-term wear and tear can also break down the cartilage. The cartilage can also break down from arthritis. Cartilage damage causes joint injury and pain.   Overuse injuries such as runner's knee, tendonitis, and iliotibial band syndrome happen from overtraining or overworking your knee. Runner's knee develops when the shock absorbing ability of the knee begins to break down. To prevent this, you need to cut back your activity level. Tendinosis (formerly called tendonitis) is caused by overused muscle tendons that become irritated and cause pain and swelling. The iliotibial band runs down the outer side of the knee. When it is tight, doing the same motion over and over causes the tendon to rub against the bony area on the outside of the knee, causing irritation and pain.   How are knee injuries treated?   Knee injuries are usually treated in the following way:   Rest the knee.   Put an ice pack, gel pack, or package of frozen vegetables, wrapped in a cloth on the area every 3 to 4 hours, for up to 20 minutes at a time.   Raise your knee on a pillow when you sit or lie down.   Take an  anti-inflammatory medicine such as ibuprofen, or other medicine as directed by your provider. Nonsteroidal anti-inflammatory medicines (NSAIDs) may cause stomach bleeding and other problems. These risks increase with age. Read the label and take as directed. Unless recommended by your healthcare provider, do not take for more than 10 days.   Wear an elastic bandage to reduce swelling.   See your healthcare provider if your knee pain lasts for more than 72 hours.   Many injuries can take weeks or months to heal and treatment may include doing physical therapy exercises. If you have torn cartilage or a torn ligament, sometimes surgery is needed.   How can I prevent knee injuries?   To reduce your risk for knee injury, follow these simple tips:   Warm up before and cool down after vigorous exercise by walking and stretching the leg muscles.   Strengthen the muscles in the upper thigh and lower leg to give the knee more stability.   Gradually increase how hard you exercise for several weeks. For example, do not double the amount of exercise you do from one week to the next.   Properly align your knees with your feet while exercising.   Wear shoes with proper arch supports and cushioning.   Avoid exercising on hard surfaces.   When cycling, make sure the seat height is correct for the length of your legs.   Switch the kinds of exercises you do. For example, swim or bike instead of run every day.     Published by [employer].  This content is reviewed periodically and is subject to change as new health information becomes available. The information is intended to inform and educate and is not a replacement for medical evaluation, advice, diagnosis or treatment by a healthcare professional.   Developed by [employer].   ? 2010 [employer] and/or its affiliates. All Rights Reserved.   Copyright   [employer] 2011  [employer]

## 2019-01-04 ASSESSMENT — ANXIETY QUESTIONNAIRES: GAD7 TOTAL SCORE: 17

## 2019-02-19 ENCOUNTER — HOSPITAL ENCOUNTER (OUTPATIENT)
Dept: PHYSICAL THERAPY | Facility: HOSPITAL | Age: 24
Setting detail: THERAPIES SERIES
End: 2019-02-19
Attending: PHYSICIAN ASSISTANT
Payer: COMMERCIAL

## 2019-02-19 PROCEDURE — 97161 PT EVAL LOW COMPLEX 20 MIN: CPT | Mod: GP

## 2019-02-19 PROCEDURE — 97035 APP MDLTY 1+ULTRASOUND EA 15: CPT | Mod: GP

## 2019-02-19 ASSESSMENT — ACTIVITIES OF DAILY LIVING (ADL)
RAW_SCORE: 39
SQUAT: ACTIVITY IS VERY DIFFICULT
KNEE_ACTIVITY_OF_DAILY_LIVING_SUM: 39
WEAKNESS: THE SYMPTOM AFFECTS MY ACTIVITY SLIGHTLY
KNEEL ON THE FRONT OF YOUR KNEE: ACTIVITY IS VERY DIFFICULT
SIT WITH YOUR KNEE BENT: ACTIVITY IS NOT DIFFICULT
GO DOWN STAIRS: ACTIVITY IS MINIMALLY DIFFICULT
AS_A_RESULT_OF_YOUR_KNEE_INJURY,_HOW_WOULD_YOU_RATE_YOUR_CURRENT_LEVEL_OF_DAILY_ACTIVITY?: NEARLY NORMAL
RISE FROM A CHAIR: ACTIVITY IS NOT DIFFICULT
STIFFNESS: THE SYMPTOM AFFECTS MY ACTIVITY SLIGHTLY
KNEE_ACTIVITY_OF_DAILY_LIVING_SCORE: 55.71
SWELLING: THE SYMPTOM PREVENTS ME FROM ALL DAILY ACTIVITIES
WALK: ACTIVITY IS MINIMALLY DIFFICULT
GO UP STAIRS: ACTIVITY IS MINIMALLY DIFFICULT
HOW_WOULD_YOU_RATE_THE_OVERALL_FUNCTION_OF_YOUR_KNEE_DURING_YOUR_USUAL_DAILY_ACTIVITIES?: NEARLY NORMAL
GIVING WAY, BUCKLING OR SHIFTING OF KNEE: THE SYMPTOM AFFECTS MY ACTIVITY SLIGHTLY
STAND: ACTIVITY IS SOMEWHAT DIFFICULT
PAIN: THE SYMPTOM AFFECTS MY ACTIVITY SLIGHTLY
HOW_WOULD_YOU_RATE_THE_CURRENT_FUNCTION_OF_YOUR_KNEE_DURING_YOUR_USUAL_DAILY_ACTIVITIES_ON_A_SCALE_FROM_0_TO_100_WITH_100_BEING_YOUR_LEVEL_OF_KNEE_FUNCTION_PRIOR_TO_YOUR_INJURY_AND_0_BEING_THE_INABILITY_TO_PERFORM_ANY_OF_YOUR_USUAL_DAILY_ACTIVITIES?: 80
LIMPING: THE SYMPTOM PREVENTS ME FROM ALL DAILY ACTIVITIES

## 2019-02-19 NOTE — PROGRESS NOTES
Initial Physical Therapy Evaluations       Name: Valorie Awad MRN# [id number]   Age: 23 year old YOB: 1995     Date of Consultation: February 19, 2019  Primary care provider:Dr CORINE Muro / Abigail Diaz    Referring Physician: Abigail Diaz  Orders: Eval and Treat  Medical Diagnosis: Knee strain  Onset of Illness/Injury: 12/26/2018     Reason for PT Visit: Patient is a 22 y/o female who presents to PT after fall on ice on 12/26/18 and pt reports knee didn't feel like it came withher  when she fell and felt pain immediately laterally and landed on medial knee. Felt /heard a pop diffusely in R knee at time of fall. Prior Function: painfree ambulation - active- uses no asst device.  Xray reveals soft tissue damage.     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 Bending down hurts most.  4 steps to enter house with railing. Has 5 year old child who req assistance with car seat mainly. Also in college part time.         Fall Screen:   Have you fallen 2 or more times in the last year? Yes- on ice both time  Have you fallen and had an injury in the last year? Yes  :   Is patient a fall risk? no    Past Medical History:   Past Medical History:   Diagnosis Date     Abnormal Pap smear      Anxiety 9/20/2001     Marijuana use      Migraine headaches 10/10/2007     Supervision of other normal pregnancy        Past Surgical History:  Past Surgical History:   Procedure Laterality Date     Comminuted fracture elbow Left 10/25/2013    lauren placed     CYSTOSCOPY      bladder distention     TONSILLECTOMY         Medications:   Current Outpatient Medications   Medication Sig     Acetaminophen (TYLENOL PO) Take 1,000 mg by mouth every 6 hours as needed for mild pain or fever     clonazePAM (KLONOPIN) 2 MG tablet TAKE ONE TABLET BY MOUTH EVERY DAY AS NEEDED FOR ANXIETY     Desogestrel-Ethinyl Estradiol (ISIBLOOM PO) Take by mouth daily      escitalopram (LEXAPRO) 10 MG tablet Take 1/2 tablet daily for one week then increase to 1 tablet daily     hydrOXYzine (VISTARIL) 25 MG capsule Take 25-50 mg by mouth 3 times daily as needed for itching     ISIBLOOM 0.15-30 MG-MCG tablet TK 1 T PO D     nabumetone (RELAFEN) 500 MG tablet Take 1-2 tablets (500-1,000 mg) by mouth 2 times daily as needed for moderate pain     order for DME Equipment being ordered: TEDS     rizatriptan (MAXALT-MLT) 10 MG ODT tab Take 1 tablet (10 mg) by mouth at onset of headache for migraine May repeat in 2 hours. Max 3 tablets/24 hours.     SUMAtriptan (IMITREX) 100 MG tablet Take 1 tablet (100 mg) by mouth at onset of headache for migraine May repeat in 2 hours if needed: max 2/day; average number of headaches monthly     topiramate (TOPAMAX) 25 MG tablet Take 1 tablet (25 mg) by mouth 2 times daily     traMADol (ULTRAM) 50 MG tablet Take 1-2 tablets ( mg) by mouth every 6 hours as needed for severe pain     triamcinolone (KENALOG) 0.1 % paste Take by mouth 3 times daily     No current facility-administered medications for this encounter.          Musculoskeletal Findings:   Patient goals: pain free walking, working     OBJECTIVE  Observation:    No edema present R knee.      Palpation: Global diffuse tenderness to R knee consistent with signif reduced tenderness subjectively since injury.     Gait: normal including tandem walk, heel and toe walk.    Assistive devices: no assistive devices     Balance:wnl     Functional mobility   R knee pain Pain with squatting and occas when shifting weight during car seat. Also reports Stiffness in am after sleeping  Posture: Normal erect.     Range of Motion/Strength:   Knee Range of Motion and Strength  RIGHT  ROM in degrees      Passive      Active  Flexion                                        127  Extension                                      0      LEFT  ROM in degrees             Passive        Active  Flexion                                                 137     Extension                                               0      STRENGTH ( /5)            RIGHT             LEFT  Flexion                              3+/5               5/5  Extension                          4-/5               5/5    R KNEE- Ligament testing- R lateral and medial collateral ligaments have incrd laxity. Pain only with palpation to R lateral collateral ligament.PCL/ACL ligaments intact. Patellar mobilization is wnl and pain free. Pt does have patellofemoral pain during bicycle this eval date.      Special Tests: No's negative bilaterally.        GOALS:   Short-term goals:  To be achieved in 2-4 weeks:    Instruct in home program (MET)  1.) Patient will understand biomechanical stressors of the knee joint in order to make modifications to activities to reduce further risk of injury.  2.) Patient will be independent/compliant with  home exercise program/ice instructions.  3.) Improve R knee active range of motion and painfree to wnl (0-135)      Long-term goals:  To be achieved in 8-10 weeks:    Return to previous level of function  Self management of symptoms.  Pain free activities of daily living.    1 Pt will be able to get up from squatting position with R knee pain under 4/10 rating.   2 Improve R  knee strength 5/5 in flexion and extension.      Discharge goals: Patient will be independent with a home program for self-management of symptoms.      Planned Interventions: Therapeutic exercise, manual therapy, therapeutic activity, patient education, gait training    Treatment Rendered/Intervention: Pulsed US x 6 mn to R lateral knee/joint line on 1 w/cm2 at 50% frquency, then performed bicycle x 4 mns, quad set over towel roll x 10. Taped knee with 1 large strip laterally and 1 smaller strip medially.     2/19/2019 : Evaluation completed as described above followed by discussion of exam findings and plan of care. Therapeutic exercise: Patient instructed in and  demonstrated proper performance of home exercise program consisting of quad sets over pillow or towel roll and icing instructions. Educated in PRICE principles. Also issued golfers lift to assist in decr R knee pain with squatting when at work or at home.    Clinical Impressions:  Criteria for Skilled Therapeutic Intervention Met: Yes  PT Diagnosis: R Lateral Collateral ligmanent strain with patellofemoral syndrome, quad weakness  Influenced by the following impairments: Decreased strength, edema, decreased mobility, and pain  Functional limitations due to impairment: Decreased ability to ambulate, stand, and perform functional movements  Clinical presentation: Evolving/Changing  Clinical presentation rationale: eval  Clinical Decision making (complexity): Low Complexity  Predicted Duration of Therapy Intervention (days/wks): 6 weeks  Risks and Benefits of therapy have been explained: Yes  Patient, Family & other staff in agreement with plan of care: Yes  Comments:  Frequency: 1-2 times/week  Date Range: 2/19/19- 4/19/19    Total Evaluation Time:   30  I certify the need for these services furnished under this plan of treatment and while under my care. (Physician co-signature of this document indicates review and certification of the therapy plan).      _____________________________     __________________________    ____________  Physician's Signature                 Date               Time

## 2019-02-26 ENCOUNTER — HOSPITAL ENCOUNTER (OUTPATIENT)
Dept: PHYSICAL THERAPY | Facility: HOSPITAL | Age: 24
Setting detail: THERAPIES SERIES
End: 2019-02-26
Attending: FAMILY MEDICINE
Payer: COMMERCIAL

## 2019-02-26 PROCEDURE — 97035 APP MDLTY 1+ULTRASOUND EA 15: CPT | Mod: GP

## 2019-02-26 PROCEDURE — 97110 THERAPEUTIC EXERCISES: CPT | Mod: GP

## 2019-03-02 DIAGNOSIS — B00.9 HSV (HERPES SIMPLEX VIRUS) INFECTION: ICD-10-CM

## 2019-03-05 ENCOUNTER — TELEPHONE (OUTPATIENT)
Dept: FAMILY MEDICINE | Facility: OTHER | Age: 24
End: 2019-03-05

## 2019-03-05 RX ORDER — VALACYCLOVIR HYDROCHLORIDE 500 MG/1
TABLET, FILM COATED ORAL
Qty: 6 TABLET | Refills: 0 | Status: SHIPPED | OUTPATIENT
Start: 2019-03-05 | End: 2019-03-10

## 2019-03-05 NOTE — TELEPHONE ENCOUNTER
2:42 PM    Reason for Call: OVERBOOK    Patient is having the following symptoms: follow up knee pain not getting better for 1 months.    The patient is requesting an appointment for ASAP with Dr.Susan Muro.    Was an appointment offered for this call? yes  If yes : Appointment type short               Date 03/19/19 can not wait this long     Preferred method for responding to this message: Telephone Call  What is your phone number ?[phone]    If we cannot reach you directly, may we leave a detailed response at the number you provided? Yes    Can this message wait until your PCP/provider returns, if unavailable today? Not applicable, pcp is in     [facility]

## 2019-03-05 NOTE — TELEPHONE ENCOUNTER
Please schedule patient for date/time: we don't have any availability this week per Dr Mathews but may see another provider or go to urgent care if unable to wait until 3/19/19.     Have patient go to ER/Urgent Care Center. Urgent Care hours are 9:30 am to 8 pm, open 7 days a week. Yes.    Provider will call patient.No.    Other:

## 2019-03-05 NOTE — TELEPHONE ENCOUNTER
Valtrex 500mg      Last Written Prescription Date:  Not on list  Last Fill Quantity: -,   # refills: -  Last Office Visit: 10/18/18  Future Office visit:       Routing refill request to provider for review/approval because:  Drug not active on patient's medication list    The source prescription was discontinued on 1/3/2019 by Urszula Morrow LPN for the following reason: Medication Reconciliation Clean Up.

## 2019-03-10 DIAGNOSIS — B00.9 HSV (HERPES SIMPLEX VIRUS) INFECTION: ICD-10-CM

## 2019-03-12 RX ORDER — VALACYCLOVIR HYDROCHLORIDE 500 MG/1
TABLET, FILM COATED ORAL
Qty: 6 TABLET | Refills: 0 | Status: SHIPPED | OUTPATIENT
Start: 2019-03-12 | End: 2019-03-19

## 2019-03-12 NOTE — PROGRESS NOTES
SUBJECTIVE:   Valorie Awad is a 23 year old female who presents to clinic today for the following health issues:      Musculoskeletal problem/pain      Duration: about 3 months    Description  Location: Right knee    Intensity:  moderate    Accompanying signs and symptoms: none    History  Previous similar problem: no   Previous evaluation:  x-ray    Precipitating or alleviating factors:  Trauma or overuse: YES- Fall   Aggravating factors include: standing, walking and climbing stairs    Therapies tried and outcome: rest/inactivity, ice and physical therapy. States did a few visits with physical therapy however its not helping and they want more imaging done as its not helping.     Migraines  These continue to be a problem, occurring most days of the week. She was started on Topamax for anxiety by Dr Leahy and has found that this has been somewhat helpful. We discussed increasing this. She would like tramadol renewed which she takes for severe headaches, requiring 100 mg dose usually. She uses Imitrex for mild headaches and maxalt for severe headaches    Vaginal herpes  She has had recurrent outbreaks every few weeks. We discussed using prophylactic medications to control this for her    Skin change  Whitish change of the vaginal labia she has noted with no pain or discomfort. Menses remain normal        Problem list and histories reviewed & adjusted, as indicated.  Additional history: as documented    Patient Active Problem List   Diagnosis     Anxiety state     Migraine     Spasm of muscle     ACP (advance care planning)     Panic disorder without agoraphobia     Recurrent major depressive disorder, in full remission (H)     Past Surgical History:   Procedure Laterality Date     Comminuted fracture elbow Left 10/25/2013    lauren placed     CYSTOSCOPY      bladder distention     TONSILLECTOMY         Social History     Tobacco Use     Smoking status: Never Smoker     Smokeless tobacco: Never Used   Substance Use  Topics     Alcohol use: Yes     Alcohol/week: 0.0 oz     Comment: social     Family History   Problem Relation Age of Onset     Other - See Comments Father         alcoholism     Depression Father      Mental Illness Father      Depression Mother      Irritable Bowel Syndrome Mother      Mental Illness Mother      Other - See Comments Mother         migraine headache     Other - See Comments Maternal Grandmother         blood disease     Lipids Maternal Grandmother         hyperlipidemia     Thyroid Disease Maternal Grandmother      Diabetes No family hx of      Asthma No family hx of          Current Outpatient Medications   Medication Sig Dispense Refill     Acetaminophen (TYLENOL PO) Take 1,000 mg by mouth every 6 hours as needed for mild pain or fever       clonazePAM (KLONOPIN) 2 MG tablet TAKE ONE TABLET BY MOUTH EVERY DAY AS NEEDED FOR ANXIETY 30 tablet 3     Desogestrel-Ethinyl Estradiol (ISIBLOOM PO) Take by mouth daily       escitalopram (LEXAPRO) 10 MG tablet Take 10 mg by mouth daily       hydrOXYzine (VISTARIL) 25 MG capsule Take 25-50 mg by mouth 3 times daily as needed for itching       nabumetone (RELAFEN) 500 MG tablet Take 1 tablet (500 mg) by mouth 2 times daily 60 tablet 1     nabumetone (RELAFEN) 500 MG tablet Take 1-2 tablets (500-1,000 mg) by mouth 2 times daily as needed for moderate pain 60 tablet 1     rizatriptan (MAXALT-MLT) 10 MG ODT Take 1 tablet (10 mg) by mouth at onset of headache for migraine May repeat in 2 hours. Max 3 tablets/24 hours. 6 tablet 3     SUMAtriptan (IMITREX) 100 MG tablet Take 1 tablet (100 mg) by mouth at onset of headache for migraine May repeat in 2 hours if needed: max 2/day; average number of headaches monthly 9 tablet 3     topiramate (TOPAMAX) 50 MG tablet Take 1 tablet (50 mg) by mouth 2 times daily 60 tablet 3     traMADol (ULTRAM) 50 MG tablet Take 1 tablet (50 mg) by mouth every 6 hours as needed for severe pain 20 tablet 0     valACYclovir (VALTREX) 500  MG tablet Take 1 tablet (500 mg) by mouth daily 30 tablet 3     Allergies   Allergen Reactions     Bactrim [Sulfamethoxazole W-Trimethoprim] Rash     Morphine Hcl Nausea and Vomiting     Clindamycin Other (See Comments)     Thrush       Nitrofurantoin      Macrobid      Nitrofurantoin Monohydrate Macrocrystals      Macrobid      Loracarbef Rash     Lorabid     BP Readings from Last 3 Encounters:   03/19/19 120/74   01/03/19 110/66   12/27/18 104/68    Wt Readings from Last 3 Encounters:   03/19/19 79.4 kg (175 lb)   12/26/18 86.2 kg (190 lb)   11/20/18 88.5 kg (195 lb)                    Reviewed and updated as needed this visit by clinical staff       Reviewed and updated as needed this visit by Provider         ROS:  Constitutional, HEENT, cardiovascular, pulmonary, gi and gu systems are negative, except as otherwise noted.    OBJECTIVE:                                                    /74   Pulse 74   Resp 19   Wt 79.4 kg (175 lb)   BMI 28.25 kg/m     Body mass index is 28.25 kg/m .  GENERAL APPEARANCE: healthy, alert and no distress   (female): normal external genitalia with no significant color change noted or abnormal lesions noted  ORTHO: Knee Exam: Inspection: AP/lateral alignment normal  Tender: popliteal bursa noted, slightly tender  Non-tender: lateral patellar facet, medial patellar facet, inferior pole patella, patella tendon, lateral joint line, medial joint line  Active Range of Motion: full flexion, full extension  Strength: full strength  Special tests: normal Valgus stress test, positive Valgus stress test, negative posterior drawer  SKIN: no suspicious lesions or rashes  NEURO: Normal strength and tone, mentation intact and speech normal  PSYCH: mentation appears normal and affect normal/bright         ASSESSMENT/PLAN:                                                    1. Intractable chronic migraine without aura and without status migrainosus  Medications renewed, increase Topamax  to 50 mg bid for migraine . Will increase Tramadol to #20 per month, hopefully with increase in Topamax will decrease headache frequency  - SUMAtriptan (IMITREX) 100 MG tablet; Take 1 tablet (100 mg) by mouth at onset of headache for migraine May repeat in 2 hours if needed: max 2/day; average number of headaches monthly  Dispense: 9 tablet; Refill: 3  - rizatriptan (MAXALT-MLT) 10 MG ODT; Take 1 tablet (10 mg) by mouth at onset of headache for migraine May repeat in 2 hours. Max 3 tablets/24 hours.  Dispense: 6 tablet; Refill: 3  - topiramate (TOPAMAX) 50 MG tablet; Take 1 tablet (50 mg) by mouth 2 times daily  Dispense: 60 tablet; Refill: 3  - traMADol (ULTRAM) 50 MG tablet; Take 1 tablet (50 mg) by mouth every 6 hours as needed for severe pain  Dispense: 20 tablet; Refill: 0    2. Knee locking, right  MRI will refer to orthopedics for evaluation  - nabumetone (RELAFEN) 500 MG tablet; Take 1 tablet (500 mg) by mouth 2 times daily  Dispense: 60 tablet; Refill: 1  - MR Knee Right w/o Contrast; Future  - ORTHOPEDICS ADULT REFERRAL    3. Herpes simplex vulvovaginitis  Start valtrex daily  - valACYclovir (VALTREX) 500 MG tablet; Take 1 tablet (500 mg) by mouth daily  Dispense: 30 tablet; Refill: 3      Follow up with Provider - 4 weeks     Bisi Muro MD  [facility] [place]

## 2019-03-12 NOTE — TELEPHONE ENCOUNTER
valACYclovir (VALTREX) 500 MG tablet   Last Written Prescription Date:  3/5/19  Last Fill Quantity: 6,   # refills: 0  Last Office Visit: 1/3/19  Future Office visit:    Next 5 appointments (look out 90 days)    Mar 19, 2019  2:30 PM CDT  (Arrive by 2:15 PM)  SHORT with Bisi Muro MD  [facility] ([facility] [place] ) [address]  [place] [address]  [phone]

## 2019-03-19 ENCOUNTER — OFFICE VISIT (OUTPATIENT)
Dept: FAMILY MEDICINE | Facility: OTHER | Age: 24
End: 2019-03-19
Attending: FAMILY MEDICINE
Payer: COMMERCIAL

## 2019-03-19 VITALS
RESPIRATION RATE: 19 BRPM | HEART RATE: 74 BPM | WEIGHT: 175 LBS | BODY MASS INDEX: 28.25 KG/M2 | DIASTOLIC BLOOD PRESSURE: 74 MMHG | SYSTOLIC BLOOD PRESSURE: 120 MMHG

## 2019-03-19 DIAGNOSIS — M23.91 KNEE LOCKING, RIGHT: Primary | ICD-10-CM

## 2019-03-19 DIAGNOSIS — A60.04 HERPES SIMPLEX VULVOVAGINITIS: ICD-10-CM

## 2019-03-19 DIAGNOSIS — G43.719 INTRACTABLE CHRONIC MIGRAINE WITHOUT AURA AND WITHOUT STATUS MIGRAINOSUS: ICD-10-CM

## 2019-03-19 PROBLEM — F33.42 RECURRENT MAJOR DEPRESSIVE DISORDER, IN FULL REMISSION (H): Status: ACTIVE | Noted: 2018-10-18

## 2019-03-19 PROCEDURE — 99214 OFFICE O/P EST MOD 30 MIN: CPT | Performed by: FAMILY MEDICINE

## 2019-03-19 PROCEDURE — G0463 HOSPITAL OUTPT CLINIC VISIT: HCPCS

## 2019-03-19 RX ORDER — TRAMADOL HYDROCHLORIDE 50 MG/1
50 TABLET ORAL EVERY 6 HOURS PRN
Qty: 20 TABLET | Refills: 0 | Status: SHIPPED | OUTPATIENT
Start: 2019-03-19 | End: 2019-06-12

## 2019-03-19 RX ORDER — NABUMETONE 500 MG/1
500 TABLET, FILM COATED ORAL 2 TIMES DAILY
Qty: 60 TABLET | Refills: 1 | Status: SHIPPED | OUTPATIENT
Start: 2019-03-19 | End: 2021-05-17

## 2019-03-19 RX ORDER — TRAMADOL HYDROCHLORIDE 50 MG/1
50-100 TABLET ORAL EVERY 6 HOURS PRN
Qty: 8 TABLET | Refills: 0 | Status: SHIPPED | OUTPATIENT
Start: 2019-03-19 | End: 2019-03-19 | Stop reason: ALTCHOICE

## 2019-03-19 RX ORDER — SUMATRIPTAN 100 MG/1
100 TABLET, FILM COATED ORAL
Qty: 9 TABLET | Refills: 3 | Status: SHIPPED | OUTPATIENT
Start: 2019-03-19 | End: 2020-11-07

## 2019-03-19 RX ORDER — RIZATRIPTAN BENZOATE 10 MG/1
10 TABLET, ORALLY DISINTEGRATING ORAL
Qty: 6 TABLET | Refills: 3 | Status: SHIPPED | OUTPATIENT
Start: 2019-03-19 | End: 2021-04-12 | Stop reason: ALTCHOICE

## 2019-03-19 RX ORDER — TOPIRAMATE 50 MG/1
50 TABLET, FILM COATED ORAL 2 TIMES DAILY
Qty: 60 TABLET | Refills: 3 | Status: SHIPPED | OUTPATIENT
Start: 2019-03-19 | End: 2019-11-11

## 2019-03-19 RX ORDER — VALACYCLOVIR HYDROCHLORIDE 500 MG/1
500 TABLET, FILM COATED ORAL DAILY
Qty: 30 TABLET | Refills: 3 | Status: SHIPPED | OUTPATIENT
Start: 2019-03-19 | End: 2019-12-30

## 2019-03-19 RX ORDER — ESCITALOPRAM OXALATE 10 MG/1
10 TABLET ORAL DAILY
COMMUNITY
End: 2019-08-27

## 2019-03-19 ASSESSMENT — PAIN SCALES - GENERAL: PAINLEVEL: MILD PAIN (2)

## 2019-03-19 NOTE — NURSING NOTE
"Chief Complaint   Patient presents with     Knee Pain       Initial /74   Pulse 74   Resp 19   Wt 79.4 kg (175 lb)   BMI 28.25 kg/m   Estimated body mass index is 28.25 kg/m  as calculated from the following:    Height as of 10/18/18: 1.676 m (5' 6\").    Weight as of this encounter: 79.4 kg (175 lb).  Medication Reconciliation: complete    Deandra Bush LPN    "

## 2019-03-26 ENCOUNTER — HOSPITAL ENCOUNTER (OUTPATIENT)
Dept: MRI IMAGING | Facility: HOSPITAL | Age: 24
Discharge: HOME | End: 2019-03-26
Attending: FAMILY MEDICINE | Admitting: FAMILY MEDICINE
Payer: COMMERCIAL

## 2019-03-26 DIAGNOSIS — M23.91 KNEE LOCKING, RIGHT: ICD-10-CM

## 2019-03-26 PROCEDURE — 73721 MRI JNT OF LWR EXTRE W/O DYE: CPT | Mod: TC,RT

## 2019-04-08 DIAGNOSIS — F41.9 ANXIETY: ICD-10-CM

## 2019-04-08 RX ORDER — CLONAZEPAM 2 MG/1
TABLET ORAL
Qty: 30 TABLET | Refills: 3 | Status: SHIPPED | OUTPATIENT
Start: 2019-04-08 | End: 2019-08-09

## 2019-04-08 NOTE — TELEPHONE ENCOUNTER
klonopin      Last Written Prescription Date:  12/14/18  Last Fill Quantity: 30,   # refills: 3  Last Office Visit: 3/19/19  Future Office visit:       Routing refill request to provider for review/approval because:  Drug not on the [facility], [facility] or [facility] refill protocol or controlled substance

## 2019-04-10 DIAGNOSIS — F41.9 ANXIETY: Primary | ICD-10-CM

## 2019-04-10 NOTE — TELEPHONE ENCOUNTER
Lexapro  Last Written Prescription Date: Patient Reported  Last Fill Quantity: NA # of Refills: NA  Last Office Visit: 3/19/19

## 2019-04-11 RX ORDER — ESCITALOPRAM OXALATE 10 MG/1
10 TABLET ORAL DAILY
Qty: 30 TABLET | Refills: 1 | Status: SHIPPED | OUTPATIENT
Start: 2019-04-11 | End: 2019-06-12

## 2019-05-07 ENCOUNTER — HOSPITAL ENCOUNTER (EMERGENCY)
Facility: HOSPITAL | Age: 24
Discharge: HOME | End: 2019-05-07
Attending: FAMILY MEDICINE | Admitting: FAMILY MEDICINE
Payer: COMMERCIAL

## 2019-05-07 VITALS
RESPIRATION RATE: 16 BRPM | OXYGEN SATURATION: 98 % | HEART RATE: 127 BPM | SYSTOLIC BLOOD PRESSURE: 132 MMHG | TEMPERATURE: 98.8 F | DIASTOLIC BLOOD PRESSURE: 89 MMHG

## 2019-05-07 DIAGNOSIS — N93.9 VAGINAL BLEEDING: Primary | ICD-10-CM

## 2019-05-07 LAB
ABO + RH BLD: NORMAL
ABO + RH BLD: NORMAL
ALBUMIN UR-MCNC: NEGATIVE MG/DL
ANION GAP SERPL CALCULATED.3IONS-SCNC: 7 MMOL/L (ref 3–14)
APPEARANCE UR: CLEAR
B-HCG SERPL-ACNC: <1 IU/L (ref 0–5)
BACTERIA #/AREA URNS HPF: ABNORMAL /HPF
BASOPHILS # BLD AUTO: 0 10E9/L (ref 0–0.2)
BASOPHILS NFR BLD AUTO: 0.3 %
BILIRUB UR QL STRIP: NEGATIVE
BLD GP AB SCN SERPL QL: NORMAL
BLOOD BANK CMNT PATIENT-IMP: NORMAL
BUN SERPL-MCNC: 7 MG/DL (ref 7–30)
CALCIUM SERPL-MCNC: 8.8 MG/DL (ref 8.5–10.1)
CHLORIDE SERPL-SCNC: 109 MMOL/L (ref 94–109)
CO2 SERPL-SCNC: 23 MMOL/L (ref 20–32)
COLOR UR AUTO: ABNORMAL
CREAT SERPL-MCNC: 0.8 MG/DL (ref 0.52–1.04)
DIFFERENTIAL METHOD BLD: ABNORMAL
EOSINOPHIL # BLD AUTO: 0.1 10E9/L (ref 0–0.7)
EOSINOPHIL NFR BLD AUTO: 0.4 %
ERYTHROCYTE [DISTWIDTH] IN BLOOD BY AUTOMATED COUNT: 11.8 % (ref 10–15)
GFR SERPL CREATININE-BSD FRML MDRD: >90 ML/MIN/{1.73_M2}
GLUCOSE SERPL-MCNC: 103 MG/DL (ref 70–99)
GLUCOSE UR STRIP-MCNC: NEGATIVE MG/DL
HCG UR QL: NEGATIVE
HCT VFR BLD AUTO: 37.9 % (ref 35–47)
HGB BLD-MCNC: 13.5 G/DL (ref 11.7–15.7)
HGB UR QL STRIP: ABNORMAL
IMM GRANULOCYTES # BLD: 0 10E9/L (ref 0–0.4)
IMM GRANULOCYTES NFR BLD: 0.2 %
KETONES UR STRIP-MCNC: NEGATIVE MG/DL
LEUKOCYTE ESTERASE UR QL STRIP: ABNORMAL
LYMPHOCYTES # BLD AUTO: 3.3 10E9/L (ref 0.8–5.3)
LYMPHOCYTES NFR BLD AUTO: 24.9 %
MCH RBC QN AUTO: 30.8 PG (ref 26.5–33)
MCHC RBC AUTO-ENTMCNC: 35.6 G/DL (ref 31.5–36.5)
MCV RBC AUTO: 87 FL (ref 78–100)
MONOCYTES # BLD AUTO: 0.5 10E9/L (ref 0–1.3)
MONOCYTES NFR BLD AUTO: 4 %
MUCOUS THREADS #/AREA URNS LPF: PRESENT /LPF
NEUTROPHILS # BLD AUTO: 9.4 10E9/L (ref 1.6–8.3)
NEUTROPHILS NFR BLD AUTO: 70.2 %
NITRATE UR QL: NEGATIVE
NRBC # BLD AUTO: 0 10*3/UL
NRBC BLD AUTO-RTO: 0 /100
PH UR STRIP: 7 PH (ref 4.7–8)
PLATELET # BLD AUTO: 296 10E9/L (ref 150–450)
POTASSIUM SERPL-SCNC: 3 MMOL/L (ref 3.4–5.3)
RBC # BLD AUTO: 4.38 10E12/L (ref 3.8–5.2)
RBC #/AREA URNS AUTO: 2 /HPF (ref 0–2)
SODIUM SERPL-SCNC: 139 MMOL/L (ref 133–144)
SOURCE: ABNORMAL
SP GR UR STRIP: 1 (ref 1–1.03)
SPECIMEN EXP DATE BLD: NORMAL
SQUAMOUS #/AREA URNS AUTO: 1 /HPF (ref 0–1)
UROBILINOGEN UR STRIP-MCNC: NORMAL MG/DL (ref 0–2)
WBC # BLD AUTO: 13.4 10E9/L (ref 4–11)
WBC #/AREA URNS AUTO: 2 /HPF (ref 0–5)

## 2019-05-07 PROCEDURE — 25000125 ZZHC RX 250

## 2019-05-07 PROCEDURE — 96372 THER/PROPH/DIAG INJ SC/IM: CPT

## 2019-05-07 PROCEDURE — 86900 BLOOD TYPING SEROLOGIC ABO: CPT | Performed by: FAMILY MEDICINE

## 2019-05-07 PROCEDURE — 86850 RBC ANTIBODY SCREEN: CPT | Performed by: FAMILY MEDICINE

## 2019-05-07 PROCEDURE — 85025 COMPLETE CBC W/AUTO DIFF WBC: CPT | Performed by: FAMILY MEDICINE

## 2019-05-07 PROCEDURE — 36415 COLL VENOUS BLD VENIPUNCTURE: CPT | Performed by: FAMILY MEDICINE

## 2019-05-07 PROCEDURE — 81001 URINALYSIS AUTO W/SCOPE: CPT | Performed by: FAMILY MEDICINE

## 2019-05-07 PROCEDURE — 80048 BASIC METABOLIC PNL TOTAL CA: CPT | Performed by: FAMILY MEDICINE

## 2019-05-07 PROCEDURE — 25000128 H RX IP 250 OP 636: Performed by: FAMILY MEDICINE

## 2019-05-07 PROCEDURE — 99284 EMERGENCY DEPT VISIT MOD MDM: CPT | Mod: 25

## 2019-05-07 PROCEDURE — 81025 URINE PREGNANCY TEST: CPT | Performed by: FAMILY MEDICINE

## 2019-05-07 PROCEDURE — 25000132 ZZH RX MED GY IP 250 OP 250 PS 637: Performed by: FAMILY MEDICINE

## 2019-05-07 PROCEDURE — 99284 EMERGENCY DEPT VISIT MOD MDM: CPT | Mod: Z6 | Performed by: FAMILY MEDICINE

## 2019-05-07 PROCEDURE — 86901 BLOOD TYPING SEROLOGIC RH(D): CPT | Performed by: FAMILY MEDICINE

## 2019-05-07 PROCEDURE — 84702 CHORIONIC GONADOTROPIN TEST: CPT | Performed by: FAMILY MEDICINE

## 2019-05-07 RX ORDER — METRONIDAZOLE 500 MG/1
2000 TABLET ORAL ONCE
Status: COMPLETED | OUTPATIENT
Start: 2019-05-07 | End: 2019-05-07

## 2019-05-07 RX ORDER — CEFTRIAXONE SODIUM 1 G
250 VIAL (EA) INJECTION ONCE
Status: COMPLETED | OUTPATIENT
Start: 2019-05-07 | End: 2019-05-07

## 2019-05-07 RX ORDER — ACETAMINOPHEN 325 MG/1
975 TABLET ORAL ONCE
Status: COMPLETED | OUTPATIENT
Start: 2019-05-07 | End: 2019-05-07

## 2019-05-07 RX ORDER — LIDOCAINE HYDROCHLORIDE 10 MG/ML
INJECTION, SOLUTION EPIDURAL; INFILTRATION; INTRACAUDAL; PERINEURAL
Status: COMPLETED
Start: 2019-05-07 | End: 2019-05-07

## 2019-05-07 RX ORDER — AZITHROMYCIN 250 MG/1
1000 TABLET, FILM COATED ORAL ONCE
Status: COMPLETED | OUTPATIENT
Start: 2019-05-07 | End: 2019-05-07

## 2019-05-07 RX ADMIN — CEFTRIAXONE SODIUM 250 MG: 1 INJECTION, POWDER, FOR SOLUTION INTRAMUSCULAR; INTRAVENOUS at 17:02

## 2019-05-07 RX ADMIN — AZITHROMYCIN 1000 MG: 250 TABLET, FILM COATED ORAL at 16:59

## 2019-05-07 RX ADMIN — ACETAMINOPHEN 975 MG: 325 TABLET, FILM COATED ORAL at 16:59

## 2019-05-07 RX ADMIN — LIDOCAINE HYDROCHLORIDE 20 MG: 10 INJECTION, SOLUTION EPIDURAL; INFILTRATION; INTRACAUDAL; PERINEURAL at 17:03

## 2019-05-07 RX ADMIN — METRONIDAZOLE 2000 MG: 500 TABLET ORAL at 16:59

## 2019-05-07 ASSESSMENT — ENCOUNTER SYMPTOMS
NECK STIFFNESS: 0
SHORTNESS OF BREATH: 0
COLOR CHANGE: 0
DIFFICULTY URINATING: 0
ARTHRALGIAS: 0
ACTIVITY CHANGE: 0
ABDOMINAL PAIN: 0
HEADACHES: 0
APPETITE CHANGE: 0
EYE REDNESS: 0
CONFUSION: 0

## 2019-05-07 NOTE — ED NOTES
Discharge instructions reviewed with patient.  Patient verbalized understanding and has no further questions at this time.  Patient instructed no alcohol for 1 week and to monitor symptoms and if symptoms worsen or any new concerns return to the emergency room.  Will follow up with primary in a few days for recheck.

## 2019-05-07 NOTE — ED PROVIDER NOTES
History     Chief Complaint   Patient presents with     Vaginal Bleeding     with severe cramping, patient possibly miscarried but unaware of pregnancy     HPI  Valorie Awad is a 24 year old  who presents reporting 7 days of intermittent spotting and 3 days of pelvic cramping, slightly worse on the left, as well as passage of a small amount clots earlier today. She is sexually active with a single male partner and missed several doses of her OCPs last week. She was placed on OCPs for her menstrual-related migraines. No fevers/chills or progressively worsening pain. No vaginal discharge besides blood. No fevers/chills, dysuria or flank pain.    Allergies:  Allergies   Allergen Reactions     Bactrim [Sulfamethoxazole W-Trimethoprim] Rash     Morphine Hcl Nausea and Vomiting     Clindamycin Other (See Comments)     Thrush       Nitrofurantoin      Macrobid      Nitrofurantoin Monohydrate Macrocrystals      Macrobid      Loracarbef Rash     Lorabid       Problem List:    Patient Active Problem List    Diagnosis Date Noted     Panic disorder without agoraphobia 10/18/2018     Priority: Medium     Recurrent major depressive disorder, in full remission (H) 10/18/2018     Priority: Medium     ACP (advance care planning) 2017     Priority: Medium     Advance Care Planning 2017: ACP Review of Chart / Resources Provided:  Reviewed chart for advance care plan.  Valorie Awad has no plan or code status on file. Discussed available resources and provided with information.   Added by KARLY CORBETT                 Spasm of muscle 2015     Priority: Medium     Migraine 10/10/2007     Priority: Medium     Problem list name updated by automated process. Provider to review       Anxiety state 2001     Priority: Medium     Problem list name updated by automated process. Provider to review          Past Medical History:    Past Medical History:   Diagnosis Date     Abnormal Pap smear      Anxiety  9/20/2001     Marijuana use      Migraine headaches 10/10/2007     Supervision of other normal pregnancy        Past Surgical History:    Past Surgical History:   Procedure Laterality Date     Comminuted fracture elbow Left 10/25/2013    lauren placed     CYSTOSCOPY      bladder distention     TONSILLECTOMY         Family History:    Family History   Problem Relation Age of Onset     Other - See Comments Father         alcoholism     Depression Father      Mental Illness Father      Depression Mother      Irritable Bowel Syndrome Mother      Mental Illness Mother      Other - See Comments Mother         migraine headache     Other - See Comments Maternal Grandmother         blood disease     Lipids Maternal Grandmother         hyperlipidemia     Thyroid Disease Maternal Grandmother      Diabetes No family hx of      Asthma No family hx of        Social History:  Marital Status:  Single [1]  Social History     Tobacco Use     Smoking status: Never Smoker     Smokeless tobacco: Never Used   Substance Use Topics     Alcohol use: Yes     Alcohol/week: 0.0 oz     Comment: social     Drug use: No        Medications:      Acetaminophen (TYLENOL PO)   clonazePAM (KLONOPIN) 2 MG tablet   Desogestrel-Ethinyl Estradiol (ISIBLOOM PO)   escitalopram (LEXAPRO) 10 MG tablet   escitalopram (LEXAPRO) 10 MG tablet   hydrOXYzine (VISTARIL) 25 MG capsule   nabumetone (RELAFEN) 500 MG tablet   nabumetone (RELAFEN) 500 MG tablet   rizatriptan (MAXALT-MLT) 10 MG ODT   SUMAtriptan (IMITREX) 100 MG tablet   topiramate (TOPAMAX) 50 MG tablet   valACYclovir (VALTREX) 500 MG tablet         Review of Systems   Constitutional: Negative for activity change and appetite change.   HENT: Negative for congestion.    Eyes: Negative for redness.   Respiratory: Negative for shortness of breath.    Cardiovascular: Negative for chest pain.   Gastrointestinal: Negative for abdominal pain.   Genitourinary: Negative for difficulty urinating.   Musculoskeletal:  Negative for arthralgias and neck stiffness.   Skin: Negative for color change.   Neurological: Negative for headaches.   Psychiatric/Behavioral: Negative for confusion.       Physical Exam   BP: 123/88  Pulse: (!) 127  Heart Rate: 87  Temp: 98.8  F (37.1  C)  Resp: 20  SpO2: 98 %      Physical Exam    General Appearance: well-developed, well-nourished, alert & oriented, no apparent distress.    HEENT: atraumatic. Pupils equal, round & reactive to light, extraocular movements intact. Bilateral ear canals clear. Nose without rhinorrhea or epistaxis. Clear oropharynx. Moist mucous membranes.    Neck: normal inspection, non-tender, full & painless ROM, supple, no lymphadenopathy or nuchal rigidity. No jugular venous distension.    Cardiovascular: regular rate, rhythm, normal S1 & S2, no murmurs, rubs or gallops.    Respiratory: clear lung sounds with good air entry, no wheezes rales or rhonchi, no acute respiratory distress.    Gastrointestinal: normal inspection, normal bowel sounds, mild pelvic tenderness with palpation and mild left adnexal tenderness. No rebound or guarding. No masses or organomegaly.    Extremities: normal inspection, 2+/4+ pulses bilaterally, normal & painless ROM, non-tender, joints normal.    Neurologic: CN II - XII intact, no motor/sensory deficit.    Skin: normal color, no skin rash.    ED Course        Procedures                 Results for orders placed or performed during the hospital encounter of 05/07/19 (from the past 24 hour(s))   Basic metabolic panel   Result Value Ref Range    Sodium 139 133 - 144 mmol/L    Potassium 3.0 (L) 3.4 - 5.3 mmol/L    Chloride 109 94 - 109 mmol/L    Carbon Dioxide 23 20 - 32 mmol/L    Anion Gap 7 3 - 14 mmol/L    Glucose 103 (H) 70 - 99 mg/dL    Urea Nitrogen 7 7 - 30 mg/dL    Creatinine 0.80 0.52 - 1.04 mg/dL    GFR Estimate >90 >60 mL/min/[1.73_m2]    GFR Estimate If Black >90 >60 mL/min/[1.73_m2]    Calcium 8.8 8.5 - 10.1 mg/dL   ABO/Rh type and screen    Result Value Ref Range    ABO O     RH(D) Pos     Antibody Screen Neg     Test Valid Only At [facility]        Specimen Expires 05/10/2019    HCG quantitative pregnancy (blood)   Result Value Ref Range    HCG Quantitative Serum <1 0 - 5 IU/L   CBC with platelets differential   Result Value Ref Range    WBC 13.4 (H) 4.0 - 11.0 10e9/L    RBC Count 4.38 3.8 - 5.2 10e12/L    Hemoglobin 13.5 11.7 - 15.7 g/dL    Hematocrit 37.9 35.0 - 47.0 %    MCV 87 78 - 100 fl    MCH 30.8 26.5 - 33.0 pg    MCHC 35.6 31.5 - 36.5 g/dL    RDW 11.8 10.0 - 15.0 %    Platelet Count 296 150 - 450 10e9/L    Diff Method Automated Method     % Neutrophils 70.2 %    % Lymphocytes 24.9 %    % Monocytes 4.0 %    % Eosinophils 0.4 %    % Basophils 0.3 %    % Immature Granulocytes 0.2 %    Nucleated RBCs 0 0 /100    Absolute Neutrophil 9.4 (H) 1.6 - 8.3 10e9/L    Absolute Lymphocytes 3.3 0.8 - 5.3 10e9/L    Absolute Monocytes 0.5 0.0 - 1.3 10e9/L    Absolute Eosinophils 0.1 0.0 - 0.7 10e9/L    Absolute Basophils 0.0 0.0 - 0.2 10e9/L    Abs Immature Granulocytes 0.0 0 - 0.4 10e9/L    Absolute Nucleated RBC 0.0    UA reflex to Microscopic and Culture   Result Value Ref Range    Color Urine Straw     Appearance Urine Clear     Glucose Urine Negative NEG^Negative mg/dL    Bilirubin Urine Negative NEG^Negative    Ketones Urine Negative NEG^Negative mg/dL    Specific Gravity Urine 1.002 (L) 1.003 - 1.035    Blood Urine Large (A) NEG^Negative    pH Urine 7.0 4.7 - 8.0 pH    Protein Albumin Urine Negative NEG^Negative mg/dL    Urobilinogen mg/dL Normal 0.0 - 2.0 mg/dL    Nitrite Urine Negative NEG^Negative    Leukocyte Esterase Urine Trace (A) NEG^Negative    Source Midstream Urine     RBC Urine 2 0 - 2 /HPF    WBC Urine 2 0 - 5 /HPF    Bacteria Urine Few (A) NEG^Negative /HPF    Squamous Epithelial /HPF Urine 1 0 - 1 /HPF    Mucous Urine Present (A) NEG^Negative /LPF   HCG qualitative urine   Result Value Ref Range    HCG Qual Urine  Negative NEG^Negative       Medications   cefTRIAXone (ROCEPHIN) injection 250 mg (250 mg Intramuscular Given 5/7/19 1702)   azithromycin (ZITHROMAX) tablet 1,000 mg (1,000 mg Oral Given 5/7/19 1659)   metroNIDAZOLE (FLAGYL) tablet 2,000 mg (2,000 mg Oral Given 5/7/19 1659)   acetaminophen (TYLENOL) tablet 975 mg (975 mg Oral Given 5/7/19 1659)   lidocaine (PF) (XYLOCAINE) 1 % injection (20 mg  Given 5/7/19 1703)       Assessments & Plan (with Medical Decision Making)   The patient is a 24 year old woman with pelvic pain and vaginal bleeding.    1) Pelvic pain and vaginal bleeding - unclear etiology. Negative serum hCG. Mild leukocytosis. No anemia or acute renal/hepatic abnormality. Patient declines pelvic examination, further testing for urine GC/Chlam and ultrasound examination. MD encourages patient to reconsider, but she doesn't want to stay for testing. She requests to be treated prophylactically against STDs with plan for early family doctor follow-up. Patient treated with ceftriaxone, azithromycin and metronidazole.    Plan for PCP follow-up in 2 - 3 days regarding this ER visit. The patient verbalizes agreement with this plan as well as to immediately return to the ER with any new/worsening S/Sx.      I have reviewed the nursing notes.    I have reviewed the findings, diagnosis, plan and need for follow up with the patient.       Medication List      There are no discharge medications for this visit.         Final diagnoses:   Vaginal bleeding       5/7/2019   [facility]     Toan Medina MD  05/07/19 9227

## 2019-05-07 NOTE — ED AVS SNAPSHOT
[facility]  [address] [address]  Phone:  [phone]                                    Valorie Awad   MRN: [id number]    Department:  [facility]   Date of Visit:  5/7/2019           After Visit Summary Signature Page    I have received my discharge instructions, and my questions have been answered. I have discussed any challenges I see with this plan with the nurse or doctor.    ..........................................................................................................................................  Patient/Patient Representative Signature      ..........................................................................................................................................  Patient Representative Print Name and Relationship to Patient    ..................................................               ................................................  Date                                   Time    ..........................................................................................................................................  Reviewed by Signature/Title    ...................................................              ..............................................  Date                                               Time          22EPIC Rev 08/18

## 2019-05-12 DIAGNOSIS — Z30.011 ENCOUNTER FOR INITIAL PRESCRIPTION OF CONTRACEPTIVE PILLS: ICD-10-CM

## 2019-05-14 RX ORDER — DESOGESTREL AND ETHINYL ESTRADIOL 0.15-0.03
KIT ORAL
Qty: 84 TABLET | Refills: 0 | Status: SHIPPED | OUTPATIENT
Start: 2019-05-14 | End: 2019-07-19

## 2019-05-14 NOTE — TELEPHONE ENCOUNTER
Birthcontrol - [place]  Last office visit: 03/19/19  Last refill: historically entered on patient's current medication list.     Thank you.

## 2019-06-12 DIAGNOSIS — G43.719 INTRACTABLE CHRONIC MIGRAINE WITHOUT AURA AND WITHOUT STATUS MIGRAINOSUS: ICD-10-CM

## 2019-06-12 DIAGNOSIS — F41.9 ANXIETY: ICD-10-CM

## 2019-06-12 DIAGNOSIS — Z30.011 ENCOUNTER FOR INITIAL PRESCRIPTION OF CONTRACEPTIVE PILLS: ICD-10-CM

## 2019-06-12 RX ORDER — ESCITALOPRAM OXALATE 10 MG/1
TABLET ORAL
Qty: 30 TABLET | Refills: 1 | Status: SHIPPED | OUTPATIENT
Start: 2019-06-12 | End: 2019-08-06

## 2019-06-13 RX ORDER — DESOGESTREL AND ETHINYL ESTRADIOL 0.15-0.03
KIT ORAL
Qty: 84 TABLET | Refills: 0 | OUTPATIENT
Start: 2019-06-13

## 2019-06-13 RX ORDER — TRAMADOL HYDROCHLORIDE 50 MG/1
TABLET ORAL
Qty: 20 TABLET | Refills: 0 | Status: SHIPPED | OUTPATIENT
Start: 2019-06-13 | End: 2019-07-09

## 2019-06-13 NOTE — TELEPHONE ENCOUNTER
traMADol (ULTRAM) 50 MG tablet      Last Written Prescription Date:  3-19-19  Last Fill Quantity: 20,   # refills: 0  Last Office Visit: 3-19-19  Future Office visit:       Routing refill request to provider for review/approval because:  Drug not on the [facility], [facility] or [facility] refill protocol or controlled substance

## 2019-07-09 DIAGNOSIS — G43.719 INTRACTABLE CHRONIC MIGRAINE WITHOUT AURA AND WITHOUT STATUS MIGRAINOSUS: ICD-10-CM

## 2019-07-10 RX ORDER — TRAMADOL HYDROCHLORIDE 50 MG/1
TABLET ORAL
Qty: 20 TABLET | Refills: 0 | Status: SHIPPED | OUTPATIENT
Start: 2019-07-10 | End: 2019-07-10

## 2019-07-10 RX ORDER — TRAMADOL HYDROCHLORIDE 50 MG/1
TABLET ORAL
Qty: 20 TABLET | Refills: 0 | Status: SHIPPED | OUTPATIENT
Start: 2019-07-10 | End: 2019-11-25

## 2019-07-10 NOTE — TELEPHONE ENCOUNTER
traMADol (ULTRAM) 50 MG tablet      Last Written Prescription Date:  6/13/19  Last Fill Quantity: 20,   # refills: 0  Last Office Visit: 3/19/19  Future Office visit:       Routing refill request to provider for review/approval because:  Drug not on the [facility], [facility] or [facility] refill protocol or controlled substance

## 2019-07-10 NOTE — TELEPHONE ENCOUNTER
OB in the hospital called stating that script printed on their unit. OB nurse was instructed to shred script. New script pended. Please advise. Thank you!

## 2019-07-19 DIAGNOSIS — Z30.011 ENCOUNTER FOR INITIAL PRESCRIPTION OF CONTRACEPTIVE PILLS: ICD-10-CM

## 2019-07-19 RX ORDER — DESOGESTREL AND ETHINYL ESTRADIOL 0.15-0.03
KIT ORAL
Qty: 84 TABLET | Refills: 0 | Status: SHIPPED | OUTPATIENT
Start: 2019-07-19 | End: 2019-10-17

## 2019-08-06 DIAGNOSIS — F41.9 ANXIETY: ICD-10-CM

## 2019-08-07 RX ORDER — ESCITALOPRAM OXALATE 10 MG/1
TABLET ORAL
Qty: 30 TABLET | Refills: 0 | Status: SHIPPED | OUTPATIENT
Start: 2019-08-07 | End: 2019-08-27

## 2019-08-08 DIAGNOSIS — F41.9 ANXIETY: ICD-10-CM

## 2019-08-08 NOTE — TELEPHONE ENCOUNTER
clonazePAM (KLONOPIN) 2 MG tablet      Last Written Prescription Date:  4-8-19  Last Fill Quantity: 30,   # refills: 3  Last Office Visit: 11-20-18  Future Office visit:       Routing refill request to provider for review/approval because:  Drug not on the [facility], [facility] or [facility] refill protocol or controlled substance

## 2019-08-09 RX ORDER — CLONAZEPAM 2 MG/1
TABLET ORAL
Qty: 30 TABLET | Refills: 3 | Status: SHIPPED | OUTPATIENT
Start: 2019-08-09 | End: 2019-09-09

## 2019-08-26 ENCOUNTER — NURSE TRIAGE (OUTPATIENT)
Dept: FAMILY MEDICINE | Facility: OTHER | Age: 24
End: 2019-08-26

## 2019-08-26 NOTE — TELEPHONE ENCOUNTER
Bisi Muro MD routed conversation to You 2 minutes ago (3:04 PM)      Bisi Muro MD 2 minutes ago (3:04 PM)         Best to be evaluated in clinic before treatment         Documentation       You  Bisi Muro MD 28 minutes ago (2:38 PM)     Do you agree with this plan? Or would you like to call in eye drops and follow up with covering provider tomorrow morning?    Routing comment

## 2019-08-26 NOTE — TELEPHONE ENCOUNTER
"Patient states both eyes are red and crusted shut. Reports they are more itchy than painful. Is unable to come to clinic today but scheduled with covering provider tomorrow morning as PCP is full. Educated on hand hygiene and to call back if needed or go to ER/UC if clinic hours are closed. Patient agrees with this plan.     Reason for Disposition    [1] Eye with yellow/green discharge or eyelashes stick together AND [2] NO PCP standing order to call in antibiotic eye drops    Additional Information    Negative: Eye exposure to chemical or fumes    Negative: Redness of white of eye (sclera), but no pus or only a small amount of brief pus    Negative: SEVERE eye pain (e.g., excruciating)    Negative: [1] Eyelids are very swollen (shut or almost) AND [2] fever    Negative: [1] Eyelid (outer) is very red (or tender to touch) AND [2] fever    Negative: Patient sounds very sick or weak to the triager    Negative: MODERATE eye pain (e.g., interferes with normal activities)    Negative: Fever > 104 F (40 C)    Negative: Cloudy spot or sore seen on the cornea (clear part of the eye)    Negative: Blurred vision    Negative: Eye is very swollen (shut or almost)    Negative: [1] MILD eye pain or discomfort AND [2] wears contacts    Negative: Eyelid is red and painful (or tender to touch)    Negative: Discharge from penis    Negative: New or abnormal vaginal discharge    Negative: Fever present > 3 days (72 hours)    Negative: [1] Lots of yellow or green nasal discharge AND [2] present now AND [3] fever    Negative: Weak immune system (e.g., HIV positive, cancer chemo, splenectomy, organ transplant, chronic steroids)    Answer Assessment - Initial Assessment Questions  1. EYE DISCHARGE: \"Is the discharge in one or both eyes?\" \"What color is it?\" \"How much is there?\" \"When did the discharge start?\"       Both eyes- noticed it on Saturday night, Sunday morning they were crusted together  2. REDNESS OF SCLERA: \"Is the redness in " "one or both eyes?\" \"When did the redness start?\"       Both eyes- left eye is worse  3. EYELIDS: \"Are the eyelids red or swollen?\" If so, ask: \"How much?\"       Just the eye  4. VISION: \"Is there any difficulty seeing clearly?\"       no  5. PAIN: \"Is there any pain? If so, ask: \"How bad is it?\" (Scale 1-10; or mild, moderate, severe)     - MILD (1-3): doesn't interfere with normal activities      - MODERATE (4-7): interferes with normal activities or awakens from sleep     - SEVERE (8-10): excruciating pain, unable to do any normal activities        Really ithcy  6. CONTACT LENS: \"Do you wear contacts?\"      no  7. OTHER SYMPTOMS: \"Do you have any other symptoms?\" (e.g., fever, runny nose, cough)      no  8. PREGNANCY: \"Is there any chance you are pregnant?\" \"When was your last menstrual period?\"      no    Protocols used: EYE - PUS OR [id number]      "

## 2019-08-27 ENCOUNTER — OFFICE VISIT (OUTPATIENT)
Dept: FAMILY MEDICINE | Facility: OTHER | Age: 24
End: 2019-08-27
Attending: NURSE PRACTITIONER
Payer: COMMERCIAL

## 2019-08-27 VITALS
OXYGEN SATURATION: 99 % | HEART RATE: 91 BPM | SYSTOLIC BLOOD PRESSURE: 110 MMHG | WEIGHT: 176 LBS | DIASTOLIC BLOOD PRESSURE: 72 MMHG | BODY MASS INDEX: 28.41 KG/M2

## 2019-08-27 DIAGNOSIS — H10.32 ACUTE BACTERIAL CONJUNCTIVITIS OF LEFT EYE: Primary | ICD-10-CM

## 2019-08-27 DIAGNOSIS — G43.719 INTRACTABLE CHRONIC MIGRAINE WITHOUT AURA AND WITHOUT STATUS MIGRAINOSUS: ICD-10-CM

## 2019-08-27 PROCEDURE — 99214 OFFICE O/P EST MOD 30 MIN: CPT | Performed by: NURSE PRACTITIONER

## 2019-08-27 PROCEDURE — G0463 HOSPITAL OUTPT CLINIC VISIT: HCPCS

## 2019-08-27 RX ORDER — OFLOXACIN 3 MG/ML
1 SOLUTION/ DROPS OPHTHALMIC 4 TIMES DAILY
Qty: 1 BOTTLE | Refills: 0 | Status: SHIPPED | OUTPATIENT
Start: 2019-08-27 | End: 2019-09-09

## 2019-08-27 RX ORDER — RIZATRIPTAN BENZOATE 10 MG/1
10 TABLET, ORALLY DISINTEGRATING ORAL
Qty: 6 TABLET | Refills: 3 | Status: CANCELLED | OUTPATIENT
Start: 2019-08-27

## 2019-08-27 RX ORDER — SUMATRIPTAN 100 MG/1
100 TABLET, FILM COATED ORAL
Qty: 9 TABLET | Refills: 3 | Status: CANCELLED | OUTPATIENT
Start: 2019-08-27

## 2019-08-27 ASSESSMENT — PATIENT HEALTH QUESTIONNAIRE - PHQ9: SUM OF ALL RESPONSES TO PHQ QUESTIONS 1-9: 15

## 2019-08-27 ASSESSMENT — PAIN SCALES - GENERAL: PAINLEVEL: NO PAIN (0)

## 2019-08-27 NOTE — NURSING NOTE
"Chief Complaint   Patient presents with     Eye Problem       Initial /72   Pulse 91   Wt 79.8 kg (176 lb)   SpO2 99%   BMI 28.41 kg/m   Estimated body mass index is 28.41 kg/m  as calculated from the following:    Height as of 10/18/18: 1.676 m (5' 6\").    Weight as of this encounter: 79.8 kg (176 lb).  Medication Reconciliation: complete       Skye Bell MA      "

## 2019-08-27 NOTE — PROGRESS NOTES
Subjective     Valorie Awad is a 24 year old female who presents to clinic today for the following health issues:    HPI   Eye(s) Problem      Duration: since sunday    Description:  Location: bilateral  Pain: no   Redness: YES  Discharge: YES    Accompanying signs and symptoms:very itchy with drainage.     History (Trauma, foreign body exposure,): She's had pink eye previously and her symptoms feel the same. She works as a  at a gas [employer].     Precipitating or alleviating factors (contact use): None    Therapies tried and outcome: cold wash cloth      Patient Active Problem List   Diagnosis     Anxiety state     Migraine     Spasm of muscle     ACP (advance care planning)     Panic disorder without agoraphobia     Recurrent major depressive disorder, in full remission (H)     Past Surgical History:   Procedure Laterality Date     Comminuted fracture elbow Left 10/25/2013    lauren placed     CYSTOSCOPY      bladder distention     TONSILLECTOMY         Social History     Tobacco Use     Smoking status: Never Smoker     Smokeless tobacco: Never Used   Substance Use Topics     Alcohol use: Yes     Alcohol/week: 0.0 oz     Comment: social     Family History   Problem Relation Age of Onset     Other - See Comments Father         alcoholism     Depression Father      Mental Illness Father      Depression Mother      Irritable Bowel Syndrome Mother      Mental Illness Mother      Other - See Comments Mother         migraine headache     Other - See Comments Maternal Grandmother         blood disease     Lipids Maternal Grandmother         hyperlipidemia     Thyroid Disease Maternal Grandmother      Diabetes No family hx of      Asthma No family hx of          Current Outpatient Medications   Medication Sig Dispense Refill     Acetaminophen (TYLENOL PO) Take 1,000 mg by mouth every 6 hours as needed for mild pain or fever       clonazePAM (KLONOPIN) 2 MG tablet TAKE ONE TABLET BY MOUTH EVERY DAY AS NEEDED FOR  ANXIETY 30 tablet 3     Desogestrel-Ethinyl Estradiol (ISIBLOOM PO) Take by mouth daily       hydrOXYzine (VISTARIL) 25 MG capsule Take 25-50 mg by mouth 3 times daily as needed for itching       ISIBLOOM 0.15-30 MG-MCG tablet TAKE 1 TABLET BY MOUTH DAILY 84 tablet 0     nabumetone (RELAFEN) 500 MG tablet Take 1 tablet (500 mg) by mouth 2 times daily 60 tablet 1     ofloxacin (OCUFLOX) 0.3 % ophthalmic solution Place 1 drop into both eyes 4 times daily for 7 days 1 Bottle 0     rizatriptan (MAXALT-MLT) 10 MG ODT Take 1 tablet (10 mg) by mouth at onset of headache for migraine May repeat in 2 hours. Max 3 tablets/24 hours. 6 tablet 3     SUMAtriptan (IMITREX) 100 MG tablet Take 1 tablet (100 mg) by mouth at onset of headache for migraine May repeat in 2 hours if needed: max 2/day; average number of headaches monthly 9 tablet 3     topiramate (TOPAMAX) 50 MG tablet Take 1 tablet (50 mg) by mouth 2 times daily 60 tablet 3     traMADol (ULTRAM) 50 MG tablet TAKE 1 TABLET BY MOUTH EVERY 6 HOURS AS NEEDED FOR FOR PAIN 20 tablet 0     valACYclovir (VALTREX) 500 MG tablet Take 1 tablet (500 mg) by mouth daily 30 tablet 3     Allergies   Allergen Reactions     Bactrim [Sulfamethoxazole W-Trimethoprim] Rash     Morphine Hcl Nausea and Vomiting     Clindamycin Other (See Comments)     Thrush       Nitrofurantoin      Macrobid      Nitrofurantoin Monohydrate Macrocrystals      Macrobid      Loracarbef Rash     Lorabid       Reviewed and updated as needed this visit by Provider  Allergies         Review of Systems   ROS COMP: Constitutional, HEENT, cardiovascular, pulmonary, gi and gu systems are negative, except as otherwise noted. Left eye redness and drainage. Right eye starting to itch.       Objective    /72   Pulse 91   Wt 79.8 kg (176 lb)   SpO2 99%   BMI 28.41 kg/m    Body mass index is 28.41 kg/m .  Physical Exam   GENERAL: healthy, alert and no distress  EYES: Eyes grossly normal to inspection, PERRL and  sclerae normal. Bilateral conjunctiva injected with left > right. Yellow drainage.   RESP: lungs clear to auscultation - no rales, rhonchi or wheezes  CV: regular rate and rhythm, normal S1 S2, no S3 or S4, no murmur, click or rub, no peripheral edema and peripheral pulses strong    none         Assessment & Plan   Assessment    She is off work today. She is aware that she is infectious for 24 hours from time she starts eye drops.     Plan  (H10.32) Acute bacterial conjunctivitis of left eye  (primary encounter diagnosis)  Comment: Exam positive for bacterial conjunctivitis. Good hand washing and warm compresses to clear drainage from eyes.   Plan: ofloxacin (OCUFLOX) 0.3 % ophthalmic solution            See Patient Instructions    Return if symptoms worsen or fail to improve.    Nu Burger NP  [facility]

## 2019-08-27 NOTE — PATIENT INSTRUCTIONS
Patient Education     Bacterial Conjunctivitis    You have an infection in the membranes covering the white part of the eye. This part of the eye is called the conjunctiva. The infection is called conjunctivitis. The most common symptoms of conjunctivitis include a thick, pus-like discharge from the eye, swollen eyelids, redness, eyelids sticking together upon awakening, and a gritty or scratchy feeling in the eye. Your infection was caused by bacteria. It may be treated with medicine. With treatment, the infection takes about 7 to 10 days to resolve.  Home care    Use prescribed antibiotic eye drops or ointment as directed to treat the infection.    Apply a warm compress (towel soaked in warm water) to the affected eye 3 to 4 times a day. Do this just before applying medicine to the eye.    Use a warm, wet cloth to wipe away crusting of the eyelids in the morning. This is caused by mucus drainage during the night. You may also use saline irrigating solution or artificial tears to rinse away mucus in the eye. Do not put a patch over the eye.    Wash your hands before and after touching the infected eye. This is to prevent spreading the infection to the other eye, and to other people. Don't share your towels or washcloths with others.    You may use acetaminophen or ibuprofen to control pain, unless another medicine was prescribed. (Note: If you have chronic liver or kidney disease or have ever had a stomach ulcer or gastrointestinal bleeding, talk with your doctor before using these medicines.)    Don't wear contact lenses until your eyes have healed and all symptoms are gone.  Follow-up care  Follow up with your healthcare provider, or as advised.  When to seek medical advice  Call your healthcare provider right away if any of these occur:    Worsening vision    Increasing pain in the eye    Increasing swelling or redness of the eyelid    Redness spreading around the eye  Date Last Reviewed: 7/1/2017 2000-2018  [employer]. [address] [place] [address], [place] [address]. All rights reserved. This information is not intended as a substitute for professional medical care. Always follow your healthcare professional's instructions.    Use eye drops as ordered.   Use a warm washcloth to eyes to clear secretions as a 1 time use per eye.   Good hand washing.   Follow up with eye doctor in 1-2 days if not improving.   Follow up as needed.

## 2019-08-29 ENCOUNTER — TELEPHONE (OUTPATIENT)
Dept: PSYCHIATRY | Facility: OTHER | Age: 24
End: 2019-08-29

## 2019-08-29 NOTE — TELEPHONE ENCOUNTER
3:32 PM    Received call from patient.  She was wondering if  does psych evaluations.  Patient needs one completed for probation.  Advised that  is back tomorrow and would address if this is something she can complete and patient will be notified tomorrow.     Mary Womack, RN-BSN  Care Coordination, Behavioral Health

## 2019-08-30 NOTE — TELEPHONE ENCOUNTER
8:52 AM    Talked with  and we need more specific information regarding what probation needs within the evaluation.  Advised she does have an appointment with Jyoti in a few weeks but once she finds out more information she can call and let us know.     Mary Womack, RN-BSN  Care Coordination, Behavioral Health

## 2019-09-06 DIAGNOSIS — F41.9 ANXIETY: ICD-10-CM

## 2019-09-09 ENCOUNTER — HOSPITAL ENCOUNTER (EMERGENCY)
Facility: HOSPITAL | Age: 24
Discharge: HOME | End: 2019-09-09
Attending: PHYSICIAN ASSISTANT | Admitting: PHYSICIAN ASSISTANT
Payer: COMMERCIAL

## 2019-09-09 VITALS
DIASTOLIC BLOOD PRESSURE: 81 MMHG | RESPIRATION RATE: 18 BRPM | OXYGEN SATURATION: 100 % | TEMPERATURE: 98.6 F | WEIGHT: 170 LBS | BODY MASS INDEX: 27.44 KG/M2 | SYSTOLIC BLOOD PRESSURE: 114 MMHG

## 2019-09-09 DIAGNOSIS — F41.9 ANXIETY: ICD-10-CM

## 2019-09-09 DIAGNOSIS — F41.0 ANXIETY ATTACK: ICD-10-CM

## 2019-09-09 PROCEDURE — 99284 EMERGENCY DEPT VISIT MOD MDM: CPT | Mod: Z6 | Performed by: PHYSICIAN ASSISTANT

## 2019-09-09 PROCEDURE — 99284 EMERGENCY DEPT VISIT MOD MDM: CPT | Mod: 25

## 2019-09-09 PROCEDURE — 25000128 H RX IP 250 OP 636: Performed by: PHYSICIAN ASSISTANT

## 2019-09-09 PROCEDURE — 96372 THER/PROPH/DIAG INJ SC/IM: CPT

## 2019-09-09 RX ORDER — HYDROXYZINE HYDROCHLORIDE 50 MG/ML
50 INJECTION, SOLUTION INTRAMUSCULAR ONCE
Status: COMPLETED | OUTPATIENT
Start: 2019-09-09 | End: 2019-09-09

## 2019-09-09 RX ORDER — LORAZEPAM 2 MG/ML
1 INJECTION INTRAMUSCULAR ONCE
Status: COMPLETED | OUTPATIENT
Start: 2019-09-09 | End: 2019-09-09

## 2019-09-09 RX ORDER — CLONAZEPAM 2 MG/1
TABLET ORAL
Qty: 10 TABLET | Refills: 0 | Status: SHIPPED | OUTPATIENT
Start: 2019-09-09 | End: 2019-09-18

## 2019-09-09 RX ADMIN — LORAZEPAM 1 MG: 2 INJECTION INTRAMUSCULAR; INTRAVENOUS at 12:20

## 2019-09-09 RX ADMIN — LORAZEPAM 1 MG: 2 INJECTION INTRAMUSCULAR; INTRAVENOUS at 14:09

## 2019-09-09 RX ADMIN — HYDROXYZINE HYDROCHLORIDE 50 MG: 50 INJECTION, SOLUTION INTRAMUSCULAR at 12:20

## 2019-09-09 ASSESSMENT — ENCOUNTER SYMPTOMS
CHEST TIGHTNESS: 1
DYSPHORIC MOOD: 1
PALPITATIONS: 1
ACTIVITY CHANGE: 0
SLEEP DISTURBANCE: 1
FATIGUE: 0
BRUISES/BLEEDS EASILY: 0
FEVER: 0
SHORTNESS OF BREATH: 0
NERVOUS/ANXIOUS: 1
ABDOMINAL PAIN: 0
DECREASED CONCENTRATION: 1
APPETITE CHANGE: 0

## 2019-09-09 NOTE — ED PROVIDER NOTES
History     Chief Complaint   Patient presents with     Anxiety     The history is provided by the patient.     Valorie Awad is a 24 year old female who presented to the emergency department ambulatory along with mother for evaluation of an anxiety attack.  The patient tells me that she has a significant history of anxiety and is usually able to work through them with breathing exercises and her Klonopin.  Reports rather profound anxiety attack for the last 24 hours.  Symptoms are identical to her usual anxiety attacks consisting of palpitations, chest pressures, visual issues, and thoughts of profound panic.    Allergies:  Allergies   Allergen Reactions     Bactrim [Sulfamethoxazole W-Trimethoprim] Rash     Morphine Hcl Nausea and Vomiting     Clindamycin Other (See Comments)     Thrush       Nitrofurantoin      Macrobid      Nitrofurantoin Monohydrate Macrocrystals      Macrobid      Loracarbef Rash     Lorabid       Problem List:    Patient Active Problem List    Diagnosis Date Noted     Panic disorder without agoraphobia 10/18/2018     Priority: Medium     Recurrent major depressive disorder, in full remission (H) 10/18/2018     Priority: Medium     ACP (advance care planning) 03/22/2017     Priority: Medium     Advance Care Planning 7/6/2017: ACP Review of Chart / Resources Provided:  Reviewed chart for advance care plan.  Valorie Awad has no plan or code status on file. Discussed available resources and provided with information.   Added by KARLY CORBETT                 Spasm of muscle 08/12/2015     Priority: Medium     Migraine 10/10/2007     Priority: Medium     Problem list name updated by automated process. Provider to review       Anxiety state 09/20/2001     Priority: Medium     Problem list name updated by automated process. Provider to review          Past Medical History:    Past Medical History:   Diagnosis Date     Abnormal Pap smear      Anxiety 9/20/2001     Marijuana use      Migraine  headaches 10/10/2007     Supervision of other normal pregnancy        Past Surgical History:    Past Surgical History:   Procedure Laterality Date     Comminuted fracture elbow Left 10/25/2013    lauren placed     CYSTOSCOPY      bladder distention     TONSILLECTOMY         Family History:    Family History   Problem Relation Age of Onset     Other - See Comments Father         alcoholism     Depression Father      Mental Illness Father      Depression Mother      Irritable Bowel Syndrome Mother      Mental Illness Mother      Other - See Comments Mother         migraine headache     Other - See Comments Maternal Grandmother         blood disease     Lipids Maternal Grandmother         hyperlipidemia     Thyroid Disease Maternal Grandmother      Diabetes No family hx of      Asthma No family hx of        Social History:  Marital Status:  Single [1]  Social History     Tobacco Use     Smoking status: Never Smoker     Smokeless tobacco: Never Used   Substance Use Topics     Alcohol use: Yes     Alcohol/week: 0.0 oz     Comment: social     Drug use: No        Medications:      Acetaminophen (TYLENOL PO)   clonazePAM (KLONOPIN) 2 MG tablet   Desogestrel-Ethinyl Estradiol (ISIBLOOM PO)   hydrOXYzine (VISTARIL) 25 MG capsule   nabumetone (RELAFEN) 500 MG tablet   topiramate (TOPAMAX) 50 MG tablet   traMADol (ULTRAM) 50 MG tablet   ISIBLOOM 0.15-30 MG-MCG tablet   rizatriptan (MAXALT-MLT) 10 MG ODT   SUMAtriptan (IMITREX) 100 MG tablet   valACYclovir (VALTREX) 500 MG tablet         Review of Systems   Constitutional: Negative for activity change, appetite change, fatigue and fever.   Respiratory: Positive for chest tightness. Negative for shortness of breath.    Cardiovascular: Positive for palpitations. Negative for chest pain.   Gastrointestinal: Negative for abdominal pain.   Skin: Negative.    Hematological: Does not bruise/bleed easily.   Psychiatric/Behavioral: Positive for decreased concentration, dysphoric mood and  sleep disturbance. Negative for suicidal ideas. The patient is nervous/anxious.        Physical Exam   BP: 109/73  Heart Rate: 89  Temp: 98.1  F (36.7  C)  Resp: 18  Weight: 77.1 kg (170 lb)  SpO2: 97 %      Physical Exam   Constitutional: She is oriented to person, place, and time. She appears well-developed and well-nourished.   Eyes: Pupils are equal, round, and reactive to light. Conjunctivae and EOM are normal.   Cardiovascular: Normal rate and regular rhythm.   Pulmonary/Chest: Effort normal.   Neurological: She is alert and oriented to person, place, and time.   Skin: Skin is warm and dry. Capillary refill takes less than 2 seconds.   Psychiatric:   Flat affect.  Normal eye contact.  Normal speech.  No evidence of delusions, psychosis, or flight of ideas.   Nursing note and vitals reviewed.      ED Course        Procedures               Critical Care time:  none               No results found for this or any previous visit (from the past 24 hour(s)).    Medications   LORazepam (ATIVAN) injection 1 mg (1 mg Intramuscular Given 9/9/19 1220)   hydrOXYzine (VISTARIL) injection 50 mg (50 mg Intramuscular Given 9/9/19 1220)   LORazepam (ATIVAN) injection 1 mg (1 mg Intramuscular Given 9/9/19 1409)       Assessments & Plan (with Medical Decision Making)   Protracted observation and discussion with her psychiatrist Dr. Leahy.  Short course refill of clonazepam.  Feels improved.  No reasonable indication for further work-up or intervention.  Return here for any other questions or concerns.  Found playing on cell phone on multiple rechecks.    This document was prepared using a combination of typing and voice generated software.  While every attempt was made for accuracy, spelling and grammatical errors may exist.    I have reviewed the nursing notes.    I have reviewed the findings, diagnosis, plan and need for follow up with the patient.          Current Discharge Medication List          Final diagnoses:   Anxiety  attack       9/9/2019   [facility]     Nataliia Dominique PA-C  09/09/19 [id number]

## 2019-09-09 NOTE — ED AVS SNAPSHOT
[facility]  [address] [address]  Phone:  [phone]                                    Valorie Awad   MRN: [id number]    Department:  [facility]   Date of Visit:  9/9/2019           After Visit Summary Signature Page    I have received my discharge instructions, and my questions have been answered. I have discussed any challenges I see with this plan with the nurse or doctor.    ..........................................................................................................................................  Patient/Patient Representative Signature      ..........................................................................................................................................  Patient Representative Print Name and Relationship to Patient    ..................................................               ................................................  Date                                   Time    ..........................................................................................................................................  Reviewed by Signature/Title    ...................................................              ..............................................  Date                                               Time          22EPIC Rev 08/18

## 2019-09-09 NOTE — DISCHARGE INSTRUCTIONS
Please follow-up with Dr. Leahy.     Rest and stay hydrated.     Please return here for any other concerns.

## 2019-09-09 NOTE — ED NOTES
Patient in today with a panic attack, states she has meds that she takes for her anxiety. She does use Klonopin to help them usually but it just wasn't working today. She states it's been so bad that she threw up and she felt like she was going to pass out. States she didn't not though. The panic attack has been going on and off over the weekend of her where it's gotten bad and she was able to control it enough then it got worse. States no extra stressors or anything abnormal at this time.

## 2019-09-10 RX ORDER — ESCITALOPRAM OXALATE 10 MG/1
TABLET ORAL
Qty: 30 TABLET | Refills: 0 | Status: SHIPPED | OUTPATIENT
Start: 2019-09-10 | End: 2019-10-08

## 2019-09-10 NOTE — TELEPHONE ENCOUNTER
excitalopram 10mg       Last Written Prescription Date:  historical  Last Fill Quantity: 0,   # refills: 0  Last Office Visit: 8/27/2019  Future Office visit:    Next 5 appointments (look out 90 days)    Sep 18, 2019  4:40 PM CDT  (Arrive by 4:25 PM)  [facility] Visit with [facility]y MD  [facility] [address] [place] [id number] [phone]           Routing refill request to provider for review/approval because:  Drug not active on patient's medication list

## 2019-09-18 ENCOUNTER — OFFICE VISIT (OUTPATIENT)
Dept: PSYCHIATRY | Facility: OTHER | Age: 24
End: 2019-09-18
Attending: PSYCHIATRY & NEUROLOGY
Payer: COMMERCIAL

## 2019-09-18 VITALS
BODY MASS INDEX: 27.44 KG/M2 | DIASTOLIC BLOOD PRESSURE: 84 MMHG | HEART RATE: 94 BPM | OXYGEN SATURATION: 97 % | WEIGHT: 170 LBS | SYSTOLIC BLOOD PRESSURE: 110 MMHG

## 2019-09-18 DIAGNOSIS — F41.0 PANIC DISORDER WITHOUT AGORAPHOBIA: ICD-10-CM

## 2019-09-18 DIAGNOSIS — Z79.899 ENCOUNTER FOR LONG-TERM (CURRENT) USE OF MEDICATIONS: Primary | ICD-10-CM

## 2019-09-18 PROCEDURE — G0463 HOSPITAL OUTPT CLINIC VISIT: HCPCS

## 2019-09-18 PROCEDURE — 99214 OFFICE O/P EST MOD 30 MIN: CPT | Performed by: PSYCHIATRY & NEUROLOGY

## 2019-09-18 RX ORDER — DIAZEPAM 10 MG
TABLET ORAL
Qty: 30 TABLET | Refills: 1 | Status: SHIPPED | OUTPATIENT
Start: 2019-09-18 | End: 2019-11-27

## 2019-09-18 ASSESSMENT — ANXIETY QUESTIONNAIRES
2. NOT BEING ABLE TO STOP OR CONTROL WORRYING: NEARLY EVERY DAY
6. BECOMING EASILY ANNOYED OR IRRITABLE: NEARLY EVERY DAY
1. FEELING NERVOUS, ANXIOUS, OR ON EDGE: NEARLY EVERY DAY
3. WORRYING TOO MUCH ABOUT DIFFERENT THINGS: NEARLY EVERY DAY
5. BEING SO RESTLESS THAT IT IS HARD TO SIT STILL: NOT AT ALL
IF YOU CHECKED OFF ANY PROBLEMS ON THIS QUESTIONNAIRE, HOW DIFFICULT HAVE THESE PROBLEMS MADE IT FOR YOU TO DO YOUR WORK, TAKE CARE OF THINGS AT HOME, OR GET ALONG WITH OTHER PEOPLE: VERY DIFFICULT
7. FEELING AFRAID AS IF SOMETHING AWFUL MIGHT HAPPEN: SEVERAL DAYS
4. TROUBLE RELAXING: NOT AT ALL
GAD7 TOTAL SCORE: 13

## 2019-09-18 ASSESSMENT — PATIENT HEALTH QUESTIONNAIRE - PHQ9: SUM OF ALL RESPONSES TO PHQ QUESTIONS 1-9: 11

## 2019-09-18 ASSESSMENT — PAIN SCALES - GENERAL: PAINLEVEL: NO PAIN (0)

## 2019-09-18 NOTE — LETTER
[facility]  09/18/19    Patient: Valorie Awad  YOB: 1995  Medical Record Number: [id number]  CSN: [id number]                                                                              Non-opioid Controlled Substance Agreement    I understand that my care provider has prescribed a controlled substance to help manage my condition(s). I am taking this medicine to help me function or work. I know this is strong medicine, and that it can cause serious side effects. Controlled substances can be sedating, addicting and may cause a dependency on the drug. They can affect my ability to drive or think, and cause depression. They need to be taken exactly as prescribed. Combining controlled substances with certain medicines or chemicals (such as cocaine, sedatives and tranquilizers, sleeping pills, meth) can be dangerous or even fatal. Also, if I stop controlled substances suddenly, I may have severe withdrawal symptoms.  If not helpful, I may be asked to stop them.    The risks, benefits, and side effects of these medicine(s) were explained to me. I agree that:    1. I will take part in other treatments as advised by my care team. This may be psychiatry or counseling, physical therapy, behavioral therapy, group treatment or a referral to a pain clinic. I will reduce or stop my medicine when my care team tells me to do so.  2. I will take my medicines as prescribed. I will not change the dose or schedule unless my care team tells me to. There will be no refills if I  run out early.   I may be contactedwithout warning and asked to complete a urine drug test or pill count at any time.   3. I will keep all my appointments, and understand this is part of the monitoring of controlled substances. My care team may require an office visit for EVERY controlled substance refill. If I miss appointments or don t follow instructions, my care team may stop my medicine.  4. I will not ask other providers to prescribe  controlled substances, and I will not accept controlled substances from other people. If I need another prescribed controlled substance for a new reason, I will tell my care team within 1 business day.  5. I will use one pharmacy to fill all of my controlled substance prescriptions, and it is up to me to make sure that I do not run out of my medicines on weekends or holidays. If my care team is willing to refill my controlled substance prescription without a visit, I must request refills only during office hours, refills may take up to 3 days to process, and it may take up to 5 to 7 days for my medicine to be mailed and ready at my pharmacy. Prescriptions will not be mailed anywhere except my pharmacy.    6. I am responsible for my prescriptions. If the medicine/prescription is lost or stolen, it will not be replaced. I also agree not to share controlled substance medicines with anyone.              [facility]  09/18/19  Patient:  Valorie Awad  YOB: 1995  Medical Record Number: [id number]  CSN: [id number]    7. I agree to not use ANY illegal or recreational drugs. This includes marijuana, cocaine, bath salts or other drugs. I agree not to use alcohol unless my care team says I may. I agree to give urine samples whenever asked. If I don t give a urine sample, the care team may stop my medicine.    8. If I enroll in the [place] Medical Marijuana program, I will tell my care team. I will also sign an agreement to share my medical records with my care team.    9. I will bring in my list of medicines (or my medicine bottles) each time I come to the clinic.   10. I will tell my care team right away if I become pregnant or have a new medical problem treated outside of my regular clinic.  11. I understand that this medicine can affect my thinking and judgment. It may be unsafe for me to drive, use machinery and do dangerous tasks. I will not do any of these things until I know how the medicine  affects me. If my dose changes, I will wait to see how it affects me. I will contact my care team if I have concerns about medicine side effects.    I understand that if I do not follow any of the conditions above, my prescriptions or treatment may be stopped.      I agree that my provider, clinic care team, and pharmacy may work with any city, state or federal law enforcement agency that investigates the misuse, sale, or other diversion of my controlled medicine. I will allow my provider to discuss my care with or share a copy of this agreement with any other treating provider, pharmacy or emergency room where I receive care. I agree to give up (waive) any right of privacy or confidentiality with respect to these consents.   I have read this agreement and have asked questions about anything I did not understand.    ____________________________________________________    ____________  ________  Patient signature                                                         Date      Time    ____________________________________________________     ____________  ________  Witness                                                          Date      Time    ____________________________________________________  Provider signature

## 2019-09-18 NOTE — PROGRESS NOTES
PSYCHIATRY CLINIC PROGRESS NOTE   20 minute medication management, more than 50% of time spent counseling patient on medications, medication side effects, symptom history and management   SUBJECTIVE / INTERIM HISTORY                                                                       The pt was last seen in clinic January 2018:   Children-  Traevion is 5 yo.  Last visit November '18:   Discontinue propranolol 10 mg twice daily. Start Lexapro 5 mg daily one week then increase to 10 mg daily.  We will continue Klonopin as prn. Continue Topamax 25 mg twice daily. Increase hydroxyzine 10 mg up to 3 times daily as needed to 25 to 50 mg up to TID as needed for anxiety.       - school starts October - all online  - lost her job and got new job all in three days  - found out her BF of 4 years was cheating on her  - court with [place] this am and [place] was trying to put a restraining order on her. Court again October 11  - probation after incident with her ex Bf's ex GF  - needs diagnostic assessment and today she reminds me she witnessed her father attempting to commit suicide and all the issues with [place]. Thus dx PTSD  - Topamax for migraines migraines  -- taking Klonopin as prn and feels is at the point no longer working   - rents [place] house    SUBSTANCE USE- reports no issues    SYMPTOMS- nightmares, easily startled, hypervigilance, situations reminscent of trauma cause fear / panic.  Excessive worry, easily overwhelmed, panic attacks. Feels mood is stable - doesn't feel depressed at this time.  MEDICAL ROS-  Low appetite. Hip / sciatic nerve pain, Migraines have been better with topamax no GI issues  MEDICAL / SURGICAL HISTORY                pregnant [if applicable]--no   Medical Team:     PMD- Dr. Jo Muro    Therapist- [facility]   Patient Active Problem List   Diagnosis     Anxiety state     Migraine     Spasm of muscle     ACP (advance care planning)     Panic disorder without agoraphobia     Recurrent  major depressive disorder, in full remission (H)     ALLERGY   Bactrim [sulfamethoxazole w-trimethoprim]; Morphine hcl; Clindamycin; Nitrofurantoin; Nitrofurantoin monohydrate macrocrystals; and Loracarbef  MEDICATIONS                                                                                             Current Outpatient Medications   Medication Sig     Acetaminophen (TYLENOL PO) Take 1,000 mg by mouth every 6 hours as needed for mild pain or fever     clonazePAM (KLONOPIN) 2 MG tablet TAKE ONE TABLET BY MOUTH EVERY DAY AS NEEDED FOR ANXIETY     Desogestrel-Ethinyl Estradiol (ISIBLOOM PO) Take by mouth daily     escitalopram (LEXAPRO) 10 MG tablet TAKE 1 TABLET(10 MG) BY MOUTH DAILY     hydrOXYzine (VISTARIL) 25 MG capsule Take 25-50 mg by mouth 3 times daily as needed for itching     ISIBLOOM 0.15-30 MG-MCG tablet TAKE 1 TABLET BY MOUTH DAILY     nabumetone (RELAFEN) 500 MG tablet Take 1 tablet (500 mg) by mouth 2 times daily     rizatriptan (MAXALT-MLT) 10 MG ODT Take 1 tablet (10 mg) by mouth at onset of headache for migraine May repeat in 2 hours. Max 3 tablets/24 hours.     SUMAtriptan (IMITREX) 100 MG tablet Take 1 tablet (100 mg) by mouth at onset of headache for migraine May repeat in 2 hours if needed: max 2/day; average number of headaches monthly     topiramate (TOPAMAX) 50 MG tablet Take 1 tablet (50 mg) by mouth 2 times daily     traMADol (ULTRAM) 50 MG tablet TAKE 1 TABLET BY MOUTH EVERY 6 HOURS AS NEEDED FOR FOR PAIN     valACYclovir (VALTREX) 500 MG tablet Take 1 tablet (500 mg) by mouth daily     No current facility-administered medications for this visit.        VITALS   /84 (BP Location: Right arm, Cuff Size: Adult Regular)   Pulse 94   Wt 77.1 kg (170 lb)   SpO2 97%   BMI 27.44 kg/m       PHQ9                       PHQ-9 SCORE 1/3/2019 8/27/2019 9/18/2019   PHQ-9 Total Score - - -   PHQ-9 Total Score 15 15 11       MENTAL STATUS EXAM                                                                                         Alert. Oriented to person, place, and date / time. Well groomed, calm, cooperative with good eye contact. No problems with speech or psychomotor behavior. Mood was  anxious and affect was congruent to speech content and full range. Thought process, including associations, was unremarkable and thought content was devoid of suicidal and homicidal ideation and psychotic thought. No hallucinations. Insight was good. Judgment was intact and adequate for safety. Fund of knowledge was intact. Pt demonstrates no obvious problems with attention, concentration, language, recent or remote memory although these were not formally tested.     ASSESSMENT                                                                                                      HISTORICAL:  Initial psych note 2/13/15       Notes: Buspar ineffective. Zoloft. Effexor up to 75 : ineffective and made her feel foggy and sick.   she stopped it. Paxil ineffective. Gabapentin : sedation. Past citalopram in med section (2013). Propranolol 10 mg BID ineffective.  Valorie Awad is a 25 yo with  panic disorder without agoraphobia, MDD, recurrent, mild  and NITZA. Anxiety has been bad, excessive worrying even when everything is fine. The more I've gotten to know Valorie she has PTSD -> will add to dx.     Today we reviewed controlled substance contract and she signed. UDS - lab closed by time we finished our visit so she will need to submit UDS yet in future. Meds: we agreed on discontinuing Klonopin and change to diazepam 10 mg daily prn anxiety.    TREATMENT RISK STATEMENT:  The risks, benefits, alternatives and potential adverse effects have been explained and are understood by the pt.  The pt agrees to the treatment plan with the ability to do so.  Discussion of specific concerns included- potential SEs meds.  The pt knows to call the clinic for any problems or access emergency care if needed.   There are no medical  considerations relevant to treatment, as noted above.  Substance use is not a problem as noted above.         DIAGNOSES            PTSD   Panic disorder without agoraphobia  Major depressive disorder, recurrent, mild to mod  NITZA      PLAN                                                                                                                           1) MEDICATIONS: Continue Lexapro 10 mg daily and Topamax 25 mg twice daily. DISCONTINUE Klonopin. Start diazepam 10 mg daily prn      2) THERAPY: No Change. She was working with a therapist at [facility]    3) LABS: None    4) PT MONITOR [call for probs]: worsening sx, side effects, if develops SI    5) REFERRALS [CD tx, medical, tests other]: None    6)  RTC: ~she will call to schedule

## 2019-09-18 NOTE — NURSING NOTE
"Chief Complaint   Patient presents with     Follow Up       Initial /84 (BP Location: Right arm, Cuff Size: Adult Regular)   Pulse 94   Wt 77.1 kg (170 lb)   SpO2 97%   BMI 27.44 kg/m   Estimated body mass index is 27.44 kg/m  as calculated from the following:    Height as of 10/18/18: 1.676 m (5' 6\").    Weight as of this encounter: 77.1 kg (170 lb).  Medication Reconciliation: complete  Shayna Avendaño LPN  "

## 2019-09-19 ASSESSMENT — ANXIETY QUESTIONNAIRES: GAD7 TOTAL SCORE: 13

## 2019-09-23 PROBLEM — F11.90 CHRONIC, CONTINUOUS USE OF OPIOIDS: Chronic | Status: ACTIVE | Noted: 2019-09-23

## 2019-10-02 PROBLEM — F41.0 PANIC DISORDER WITHOUT AGORAPHOBIA: Status: ACTIVE | Noted: 2018-10-18

## 2019-10-08 DIAGNOSIS — F41.9 ANXIETY: ICD-10-CM

## 2019-10-10 RX ORDER — ESCITALOPRAM OXALATE 10 MG/1
TABLET ORAL
Qty: 30 TABLET | Refills: 0 | Status: SHIPPED | OUTPATIENT
Start: 2019-10-10 | End: 2019-11-11

## 2019-10-17 DIAGNOSIS — Z30.011 ENCOUNTER FOR INITIAL PRESCRIPTION OF CONTRACEPTIVE PILLS: ICD-10-CM

## 2019-10-21 RX ORDER — DESOGESTREL AND ETHINYL ESTRADIOL 0.15-0.03
KIT ORAL
Qty: 84 TABLET | Refills: 0 | Status: SHIPPED | OUTPATIENT
Start: 2019-10-21 | End: 2020-01-14

## 2019-11-11 DIAGNOSIS — G43.719 INTRACTABLE CHRONIC MIGRAINE WITHOUT AURA AND WITHOUT STATUS MIGRAINOSUS: ICD-10-CM

## 2019-11-11 DIAGNOSIS — F41.9 ANXIETY: ICD-10-CM

## 2019-11-12 RX ORDER — TOPIRAMATE 50 MG/1
TABLET, FILM COATED ORAL
Qty: 60 TABLET | Refills: 0 | Status: SHIPPED | OUTPATIENT
Start: 2019-11-12 | End: 2019-12-30

## 2019-11-12 RX ORDER — ESCITALOPRAM OXALATE 10 MG/1
TABLET ORAL
Qty: 30 TABLET | Refills: 0 | Status: SHIPPED | OUTPATIENT
Start: 2019-11-12 | End: 2019-12-30

## 2019-11-25 DIAGNOSIS — G43.719 INTRACTABLE CHRONIC MIGRAINE WITHOUT AURA AND WITHOUT STATUS MIGRAINOSUS: ICD-10-CM

## 2019-11-27 DIAGNOSIS — F41.0 PANIC DISORDER WITHOUT AGORAPHOBIA: ICD-10-CM

## 2019-11-27 RX ORDER — TRAMADOL HYDROCHLORIDE 50 MG/1
TABLET ORAL
Qty: 8 TABLET | Refills: 0 | Status: SHIPPED | OUTPATIENT
Start: 2019-11-27 | End: 2021-06-14

## 2019-11-27 RX ORDER — DIAZEPAM 10 MG
10 TABLET ORAL EVERY 6 HOURS PRN
Qty: 30 TABLET | Refills: 0 | Status: CANCELLED | OUTPATIENT
Start: 2019-11-27

## 2019-11-27 RX ORDER — DIAZEPAM 10 MG
TABLET ORAL
Qty: 30 TABLET | Refills: 1 | Status: SHIPPED | OUTPATIENT
Start: 2019-11-27 | End: 2020-02-12

## 2019-11-27 NOTE — TELEPHONE ENCOUNTER
Diazepam (Valium) 10 mg tab.  Take 1 tab daily as needed for anxiety      Last Written Prescription Date:  9-18-19  Last Fill Quantity: 30,   # refills: 1  Last Office Visit: 9-18-19  Future Office visit:       Patient uses [facility] in [place].  Patient has been out of this medication for some time.  Takes for panic attacks.  Patient does not thinks she has picked up the last refill of this med.

## 2019-11-27 NOTE — TELEPHONE ENCOUNTER
Controlled Substance Refill Request for ultram   Problem List Complete:  No     PROVIDER TO CONSIDER COMPLETION OF PROBLEM LIST AND OVERVIEW/CONTROLLED SUBSTANCE AGREEMENT    Last Written Prescription Date:  7/10/19  Last Fill Quantity: 20,   # refills: 0    THE MOST RECENT OFFICE VISIT MUST BE WITHIN THE PAST 3 MONTHS. AT LEAST ONE FACE TO FACE VISIT MUST OCCUR EVERY 6 MONTHS. ADDITIONAL VISITS CAN BE VIRTUAL.  (THIS STATEMENT SHOULD BE DELETED.)    Last Office Visit with [facility] primary care provider: 3/19/19    Future Office visit:     Controlled substance agreement:   Encounter-Level CSA:    There are no encounter-level csa.     Patient-Level CSA:    Controlled Substance Agreement - Non - Opioid - Scan on 9/25/2019  1:57 PM: NON-OPIOID CONTROLLED SUBSTANCE AGREEMENT         Last Urine Drug Screen: No results found for: CDAUT, No results found for: COMDAT, No results found for: THC13, PCP13, COC13, MAMP13, OPI13, AMP13, BZO13, TCA13, MTD13, BAR13, OXY13, PPX13, BUP13     Processing:  Rx to be electronically transmitted to pharmacy by provider      [email or web address]       checked in past 3 months?

## 2019-12-26 DIAGNOSIS — F41.9 ANXIETY: ICD-10-CM

## 2019-12-26 DIAGNOSIS — A60.04 HERPES SIMPLEX VULVOVAGINITIS: ICD-10-CM

## 2019-12-26 DIAGNOSIS — G43.719 INTRACTABLE CHRONIC MIGRAINE WITHOUT AURA AND WITHOUT STATUS MIGRAINOSUS: ICD-10-CM

## 2019-12-27 NOTE — TELEPHONE ENCOUNTER
Lexapro       Last Written Prescription Date:  11/12/2019  Last Fill Quantity: 30,   # refills: 0    Topamax       Last Written Prescription Date:  11/12/2019  Last Fill Quantity: 60,   # refills: 0    Valtrex       Last Written Prescription Date:  3/19/2019  Last Fill Quantity: 30,   # refills: 3  Last Office Visit: 9/18/2019  Future Office visit:

## 2019-12-30 RX ORDER — ESCITALOPRAM OXALATE 10 MG/1
TABLET ORAL
Qty: 30 TABLET | Refills: 0 | Status: SHIPPED | OUTPATIENT
Start: 2019-12-30 | End: 2020-02-12

## 2019-12-30 RX ORDER — VALACYCLOVIR HYDROCHLORIDE 500 MG/1
TABLET, FILM COATED ORAL
Qty: 30 TABLET | Refills: 3 | Status: SHIPPED | OUTPATIENT
Start: 2019-12-30 | End: 2020-12-22

## 2019-12-30 RX ORDER — TOPIRAMATE 50 MG/1
TABLET, FILM COATED ORAL
Qty: 60 TABLET | Refills: 5 | Status: SHIPPED | OUTPATIENT
Start: 2019-12-30 | End: 2020-07-21

## 2019-12-30 NOTE — TELEPHONE ENCOUNTER
Topimax was increased to 50 mg twice daily by PCP on 3/19/19.      Mary Womack RN-BSN  Care Coordination, Behavioral Health

## 2020-01-09 ENCOUNTER — TELEPHONE (OUTPATIENT)
Dept: FAMILY MEDICINE | Facility: OTHER | Age: 25
End: 2020-01-09

## 2020-01-09 ENCOUNTER — OFFICE VISIT (OUTPATIENT)
Dept: FAMILY MEDICINE | Facility: OTHER | Age: 25
End: 2020-01-09
Attending: FAMILY MEDICINE
Payer: COMMERCIAL

## 2020-01-09 VITALS
WEIGHT: 178 LBS | TEMPERATURE: 97.8 F | DIASTOLIC BLOOD PRESSURE: 74 MMHG | RESPIRATION RATE: 20 BRPM | HEART RATE: 77 BPM | BODY MASS INDEX: 28.73 KG/M2 | OXYGEN SATURATION: 98 % | SYSTOLIC BLOOD PRESSURE: 122 MMHG

## 2020-01-09 DIAGNOSIS — J40 BRONCHITIS: ICD-10-CM

## 2020-01-09 DIAGNOSIS — R50.9 FEVER, UNSPECIFIED FEVER CAUSE: Primary | ICD-10-CM

## 2020-01-09 LAB
FLUAV+FLUBV RNA SPEC QL NAA+PROBE: NEGATIVE
FLUAV+FLUBV RNA SPEC QL NAA+PROBE: POSITIVE
RSV RNA SPEC NAA+PROBE: NEGATIVE
SPECIMEN SOURCE: ABNORMAL

## 2020-01-09 PROCEDURE — 99213 OFFICE O/P EST LOW 20 MIN: CPT | Performed by: FAMILY MEDICINE

## 2020-01-09 PROCEDURE — G0463 HOSPITAL OUTPT CLINIC VISIT: HCPCS

## 2020-01-09 PROCEDURE — 87631 RESP VIRUS 3-5 TARGETS: CPT | Mod: ZL | Performed by: FAMILY MEDICINE

## 2020-01-09 RX ORDER — AZITHROMYCIN 250 MG/1
TABLET, FILM COATED ORAL
Qty: 6 TABLET | Refills: 0 | Status: SHIPPED | OUTPATIENT
Start: 2020-01-09 | End: 2021-04-12

## 2020-01-09 ASSESSMENT — PAIN SCALES - GENERAL: PAINLEVEL: SEVERE PAIN (6)

## 2020-01-09 NOTE — NURSING NOTE
"Chief Complaint   Patient presents with     URI       Initial /74   Pulse 77   Temp 97.8  F (36.6  C) (Tympanic)   Resp 20   Wt 80.7 kg (178 lb)   SpO2 98%   BMI 28.73 kg/m   Estimated body mass index is 28.73 kg/m  as calculated from the following:    Height as of 10/18/18: 1.676 m (5' 6\").    Weight as of this encounter: 80.7 kg (178 lb).  Medication Reconciliation: complete  Deandra Bush LPN    "

## 2020-01-09 NOTE — PROGRESS NOTES
Subjective     Valorie Awad is a 24 year old female who presents to clinic today for the following health issues:    HPI   RESPIRATORY SYMPTOMS      Duration: 5 days    Description  nasal congestion, rhinorrhea, cough, wheezing, fever, chills, headache, fatigue/malaise, hoarse voice, diarrhea and vomiting    Severity: moderate    Accompanying signs and symptoms: None    History (predisposing factors):  none    Precipitating or alleviating factors: None    Therapies tried and outcome:  rest and fluids  Her cough is now productive of yellow sputum        Patient Active Problem List   Diagnosis     Anxiety state     Migraine     Spasm of muscle     ACP (advance care planning)     Panic disorder without agoraphobia     Recurrent major depressive disorder, in full remission (H)     Chronic, continuous use of opioids     Past Surgical History:   Procedure Laterality Date     Comminuted fracture elbow Left 10/25/2013    lauren placed     CYSTOSCOPY      bladder distention     TONSILLECTOMY         Social History     Tobacco Use     Smoking status: Never Smoker     Smokeless tobacco: Never Used   Substance Use Topics     Alcohol use: Yes     Alcohol/week: 0.0 standard drinks     Comment: social     Family History   Problem Relation Age of Onset     Other - See Comments Father         alcoholism     Depression Father      Mental Illness Father      Depression Mother      Irritable Bowel Syndrome Mother      Mental Illness Mother      Other - See Comments Mother         migraine headache     Other - See Comments Maternal Grandmother         blood disease     Lipids Maternal Grandmother         hyperlipidemia     Thyroid Disease Maternal Grandmother      Diabetes No family hx of      Asthma No family hx of          Current Outpatient Medications   Medication Sig Dispense Refill     Acetaminophen (TYLENOL PO) Take 1,000 mg by mouth every 6 hours as needed for mild pain or fever       azithromycin (ZITHROMAX) 250 MG tablet Two  tablets first day, then one tablet daily for four days. 6 tablet 0     Desogestrel-Ethinyl Estradiol (ISIBLOOM PO) Take by mouth daily       diazepam (VALIUM) 10 MG tablet Take 1 tablet daily as needed for anxiety 30 tablet 1     escitalopram (LEXAPRO) 10 MG tablet TAKE 1 TABLET(10 MG) BY MOUTH DAILY 30 tablet 0     hydrOXYzine (VISTARIL) 25 MG capsule Take 25-50 mg by mouth 3 times daily as needed for itching       ISIBLOOM 0.15-30 MG-MCG tablet TAKE 1 TABLET BY MOUTH DAILY 84 tablet 0     nabumetone (RELAFEN) 500 MG tablet Take 1 tablet (500 mg) by mouth 2 times daily 60 tablet 1     rizatriptan (MAXALT-MLT) 10 MG ODT Take 1 tablet (10 mg) by mouth at onset of headache for migraine May repeat in 2 hours. Max 3 tablets/24 hours. 6 tablet 3     SUMAtriptan (IMITREX) 100 MG tablet Take 1 tablet (100 mg) by mouth at onset of headache for migraine May repeat in 2 hours if needed: max 2/day; average number of headaches monthly 9 tablet 3     topiramate (TOPAMAX) 50 MG tablet TAKE 1 TABLET(50 MG) BY MOUTH TWICE DAILY 60 tablet 5     traMADol (ULTRAM) 50 MG tablet TAKE 1-2 TABLETS(50-100MG) BY MOUTH EVERY 6 HOURS AS NEEDED FOR SEVERE PAIN 8 tablet 0     valACYclovir (VALTREX) 500 MG tablet TAKE 1 TABLET(500 MG) BY MOUTH DAILY 30 tablet 3     Allergies   Allergen Reactions     Bactrim [Sulfamethoxazole W-Trimethoprim] Rash     Morphine Hcl Nausea and Vomiting     Clindamycin Other (See Comments)     Thrush       Nitrofurantoin      Macrobid      Nitrofurantoin Monohydrate Macrocrystals      Macrobid      Loracarbef Rash     Lorabid     BP Readings from Last 3 Encounters:   01/09/20 122/74   09/18/19 110/84   09/09/19 114/81    Wt Readings from Last 3 Encounters:   01/09/20 80.7 kg (178 lb)   09/18/19 77.1 kg (170 lb)   09/09/19 77.1 kg (170 lb)                      Reviewed and updated as needed this visit by Provider  Allergies         Review of Systems   ROS COMP: Constitutional, HEENT, cardiovascular, pulmonary, gi and  gu systems are negative, except as otherwise noted.      Objective    /74   Pulse 77   Temp 97.8  F (36.6  C) (Tympanic)   Resp 20   Wt 80.7 kg (178 lb)   SpO2 98%   BMI 28.73 kg/m    Body mass index is 28.73 kg/m .  Physical Exam   GENERAL: healthy, alert and no distress  EYES: Eyes grossly normal to inspection, PERRL and conjunctivae and sclerae normal  HENT: ear canals and TM's normal, nose and mouth without ulcers or lesions  NECK: no adenopathy, no asymmetry, masses, or scars and thyroid normal to palpation  RESP: lungs clear to auscultation - no rales, rhonchi or wheezes  CV: regular rate and rhythm, normal S1 S2, no S3 or S4, no murmur, click or rub, no peripheral edema and peripheral pulses strong  MS: no gross musculoskeletal defects noted, no edema  NEURO: Normal strength and tone, mentation intact and speech normal  PSYCH: mentation appears normal, affect normal/bright            Assessment & Plan     1. Fever  Testing pending  - Influenza A /B and RSV PCR performed in [place]    2. Bronchitis  We discussed that this may have started as influenza or an influenza like illness but she has now developed a bronchitis and would treat this with below  - azithromycin (ZITHROMAX) 250 MG tablet; Two tablets first day, then one tablet daily for four days.  Dispense: 6 tablet; Refill: 0           Return if symptoms worsen or fail to improve.    Bisi Muro MD  [facility]

## 2020-01-09 NOTE — TELEPHONE ENCOUNTER
Fever, chest hurts, cough, phlegm clear, runny nose, sore throat, vomiting, diarrhea since Saturday. Requesting to be seen today.  Scheduled at 4:00

## 2020-02-11 DIAGNOSIS — F41.9 ANXIETY: ICD-10-CM

## 2020-02-11 DIAGNOSIS — F41.0 PANIC DISORDER WITHOUT AGORAPHOBIA: ICD-10-CM

## 2020-02-12 RX ORDER — DIAZEPAM 10 MG
TABLET ORAL
Qty: 30 TABLET | Refills: 1 | Status: SHIPPED | OUTPATIENT
Start: 2020-02-12 | End: 2020-04-07

## 2020-02-12 RX ORDER — HYDROXYZINE PAMOATE 25 MG/1
25-50 CAPSULE ORAL 3 TIMES DAILY PRN
Qty: 120 CAPSULE | Refills: 0 | Status: SHIPPED | OUTPATIENT
Start: 2020-02-12 | End: 2020-04-14

## 2020-02-12 RX ORDER — ESCITALOPRAM OXALATE 10 MG/1
TABLET ORAL
Qty: 90 TABLET | Refills: 3 | Status: SHIPPED | OUTPATIENT
Start: 2020-02-12 | End: 2020-11-07

## 2020-02-12 NOTE — TELEPHONE ENCOUNTER
valium      Last Written Prescription Date:  11/27/19  Last Fill Quantity: 30,   # refills: 1  Last Office Visit: 1/9/20  Future Office visit:       Routing refill request to provider for review/approval because:  Drug not on the [facility], [facility] or [facility] refill protocol or controlled substance    hydroxyzine      Last Written Prescription Date:  Unknown patient reported  Last Fill Quantity: 0,   # refills: 0  Last Office Visit: 1/9/20  Future Office visit:       Routing refill request to provider for review/approval because:  Drug not on the [facility], [facility] or [facility] refill protocol or controlled substance    Lexapro 12/30/19 #30

## 2020-04-07 DIAGNOSIS — F41.0 PANIC DISORDER WITHOUT AGORAPHOBIA: ICD-10-CM

## 2020-04-07 RX ORDER — DIAZEPAM 10 MG
TABLET ORAL
Qty: 30 TABLET | Refills: 0 | Status: SHIPPED | OUTPATIENT
Start: 2020-04-07 | End: 2020-06-24

## 2020-04-07 NOTE — TELEPHONE ENCOUNTER
diazepam (VALIUM) 10 MG tablet         Last Written Prescription Date:  2/12/20  Last Fill Quantity: 30,   # refills: 1  Last Office Visit: 1/9/20  Future Office visit:       Routing refill request to provider for review/approval because:  Drug not on the [facility], [facility] or [facility] refill protocol or controlled substance

## 2020-05-15 DIAGNOSIS — F41.0 PANIC DISORDER WITHOUT AGORAPHOBIA: ICD-10-CM

## 2020-05-15 NOTE — TELEPHONE ENCOUNTER
valium      Last Written Prescription Date:  4/7/2020  Last Fill Quantity: 30,   # refills: 0  Last Office Visit: 1/9/2020

## 2020-05-18 RX ORDER — DIAZEPAM 10 MG
TABLET ORAL
Qty: 30 TABLET | Refills: 0 | OUTPATIENT
Start: 2020-05-18

## 2020-06-24 DIAGNOSIS — Z53.9 ERRONEOUS ENCOUNTER--DISREGARD: Primary | ICD-10-CM

## 2020-06-24 RX ORDER — DIAZEPAM 10 MG
TABLET ORAL
Qty: 30 TABLET | Refills: 1 | Status: SHIPPED | OUTPATIENT
Start: 2020-06-24 | End: 2020-09-03

## 2020-06-24 NOTE — TELEPHONE ENCOUNTER
Patient is requesting a refill of Valium.  She has been out for 1 - 2 months.  She does not take every day, only when it is needed.  Medication is not pended.  Last f/u appt was on 9-18-19.  No f/u appt is scheduled.  Thank you, please advise.

## 2020-07-18 DIAGNOSIS — G43.719 INTRACTABLE CHRONIC MIGRAINE WITHOUT AURA AND WITHOUT STATUS MIGRAINOSUS: ICD-10-CM

## 2020-07-21 RX ORDER — TOPIRAMATE 50 MG/1
TABLET, FILM COATED ORAL
Qty: 60 TABLET | Refills: 5 | Status: SHIPPED | OUTPATIENT
Start: 2020-07-21 | End: 2020-11-07

## 2020-07-21 NOTE — TELEPHONE ENCOUNTER
Topamax      Last Written Prescription Date:  6.20.20  Last Fill Quantity: #60,   # refills: 0  Last Office Visit: 1.9.20  Future Office visit:       Routing refill request to provider for review/approval because:

## 2020-09-02 DIAGNOSIS — Z30.011 ENCOUNTER FOR INITIAL PRESCRIPTION OF CONTRACEPTIVE PILLS: ICD-10-CM

## 2020-09-02 DIAGNOSIS — F41.0 PANIC DISORDER WITHOUT AGORAPHOBIA: ICD-10-CM

## 2020-09-02 NOTE — TELEPHONE ENCOUNTER
Isibloom       Last Written Prescription Date:  4/3/2020  Last Fill Quantity: 84,   # refills: 1  Last Office Visit: 1/9/2020  Future Office visit:

## 2020-09-02 NOTE — TELEPHONE ENCOUNTER
Valium  Last Written Prescription Date: 6/24/20  Last Fill Quantity: 30 # of Refills: 1  Last Office Visit: 9/18/19

## 2020-09-03 RX ORDER — DIAZEPAM 10 MG
TABLET ORAL
Qty: 30 TABLET | Refills: 1 | Status: SHIPPED | OUTPATIENT
Start: 2020-09-03 | End: 2020-11-07

## 2020-09-03 RX ORDER — DESOGESTREL AND ETHINYL ESTRADIOL 0.15-0.03
KIT ORAL
Qty: 84 TABLET | Refills: 0 | Status: SHIPPED | OUTPATIENT
Start: 2020-09-03 | End: 2020-11-24

## 2020-09-03 NOTE — TELEPHONE ENCOUNTER
DIAZEPAM 10MG TABLETS     Routing refill request to provider for review/approval because:    Drug not on the [facility], [facility] or [facility] refill protocol or controlled substance

## 2020-09-03 NOTE — TELEPHONE ENCOUNTER
Reviewed [place]  and last fill was:   8/1/120 and before fill 6/24/20 script originally written 6/24/20 with 1 refill. Will fill.

## 2020-11-07 ENCOUNTER — MYC REFILL (OUTPATIENT)
Dept: FAMILY MEDICINE | Facility: OTHER | Age: 25
End: 2020-11-07

## 2020-11-07 DIAGNOSIS — F41.0 PANIC DISORDER WITHOUT AGORAPHOBIA: ICD-10-CM

## 2020-11-07 DIAGNOSIS — G43.719 INTRACTABLE CHRONIC MIGRAINE WITHOUT AURA AND WITHOUT STATUS MIGRAINOSUS: ICD-10-CM

## 2020-11-07 DIAGNOSIS — F41.9 ANXIETY: ICD-10-CM

## 2020-11-09 RX ORDER — ESCITALOPRAM OXALATE 10 MG/1
10 TABLET ORAL DAILY
Qty: 90 TABLET | Refills: 3 | Status: SHIPPED | OUTPATIENT
Start: 2020-11-09 | End: 2021-05-17 | Stop reason: ALTCHOICE

## 2020-11-09 RX ORDER — TOPIRAMATE 50 MG/1
50 TABLET, FILM COATED ORAL 2 TIMES DAILY
Qty: 60 TABLET | Refills: 5 | Status: SHIPPED | OUTPATIENT
Start: 2020-11-09 | End: 2021-05-17

## 2020-11-09 RX ORDER — DIAZEPAM 10 MG
10 TABLET ORAL DAILY PRN
Qty: 30 TABLET | Refills: 1 | Status: SHIPPED | OUTPATIENT
Start: 2020-11-09 | End: 2021-02-01

## 2020-11-09 RX ORDER — SUMATRIPTAN 100 MG/1
100 TABLET, FILM COATED ORAL
Qty: 9 TABLET | Refills: 3 | Status: SHIPPED | OUTPATIENT
Start: 2020-11-09 | End: 2021-07-20

## 2020-11-09 NOTE — TELEPHONE ENCOUNTER
valium      Last Written Prescription Date:  9/3/2020  Last Fill Quantity: 30,   # refills: 1  Last Office Visit: 9/18/19  Future Office visit:

## 2020-11-09 NOTE — TELEPHONE ENCOUNTER
topamax      Last Written Prescription Date:  7/21/2020  Last Fill Quantity: 60,   # refills: 5  Last Office Visit: 1/9/2020  Future Office visit:       lexapro      Last Written Prescription Date:  2/12/2020  Last Fill Quantity: 90,   # refills: 3  Last Office Visit: 1/9/2020  Future Office visit:       imitrex      Last Written Prescription Date:  3/19/19  Last Fill Quantity: 9,   # refills: 3  Last Office Visit: 1/9/2020  Future Office visit:

## 2020-12-14 ENCOUNTER — MYC REFILL (OUTPATIENT)
Dept: FAMILY MEDICINE | Facility: OTHER | Age: 25
End: 2020-12-14

## 2020-12-14 ENCOUNTER — HEALTH MAINTENANCE LETTER (OUTPATIENT)
Age: 25
End: 2020-12-14

## 2020-12-14 DIAGNOSIS — F41.0 PANIC DISORDER WITHOUT AGORAPHOBIA: ICD-10-CM

## 2020-12-14 RX ORDER — DIAZEPAM 10 MG
10 TABLET ORAL DAILY PRN
Qty: 30 TABLET | Refills: 1 | Status: CANCELLED | OUTPATIENT
Start: 2020-12-14

## 2020-12-21 DIAGNOSIS — A60.04 HERPES SIMPLEX VULVOVAGINITIS: ICD-10-CM

## 2020-12-22 RX ORDER — VALACYCLOVIR HYDROCHLORIDE 500 MG/1
TABLET, FILM COATED ORAL
Qty: 30 TABLET | Refills: 3 | Status: SHIPPED | OUTPATIENT
Start: 2020-12-22 | End: 2021-09-30

## 2020-12-22 NOTE — TELEPHONE ENCOUNTER
Valtrex 500 mg      Last Written Prescription Date:  12/30/2019  Last Fill Quantity: 30,   # refills: 0  Last Office Visit: 1/9/20  Future Office visit:

## 2021-02-13 DIAGNOSIS — Z30.011 ENCOUNTER FOR INITIAL PRESCRIPTION OF CONTRACEPTIVE PILLS: ICD-10-CM

## 2021-02-14 RX ORDER — DESOGESTREL AND ETHINYL ESTRADIOL 0.15-0.03
KIT ORAL
Qty: 84 TABLET | Refills: 0 | Status: SHIPPED | OUTPATIENT
Start: 2021-02-14 | End: 2021-05-04

## 2021-02-14 NOTE — TELEPHONE ENCOUNTER
[place]       Last Written Prescription Date:  11/24/2020  Last Fill Quantity: 84,   # refills: 0  Last Office Visit: 1/9/2020  Future Office visit:

## 2021-03-15 DIAGNOSIS — F41.0 PANIC DISORDER WITHOUT AGORAPHOBIA: ICD-10-CM

## 2021-03-16 RX ORDER — DIAZEPAM 10 MG
10 TABLET ORAL DAILY PRN
Qty: 30 TABLET | Refills: 0 | Status: SHIPPED | OUTPATIENT
Start: 2021-03-16 | End: 2021-04-12

## 2021-03-16 NOTE — TELEPHONE ENCOUNTER
Valium     Last Written Prescription Date: 2.1.2021  Last Fill Quantity: 30,   # refills: 0  Last Office Visit: 1.9.2020  Future Office visit:       Routing refill request to provider for review/approval because:  Drug not on the [facility], [facility] or [facility] refill protocol or controlled substance    Sara Minor RN

## 2021-03-16 NOTE — TELEPHONE ENCOUNTER
I filled Valium. Valorie is due for a follow-up appointment and I included this with # as part of script. Been over one year and needs to be seen given prescribed a controlled substance.

## 2021-04-12 ENCOUNTER — VIRTUAL VISIT (OUTPATIENT)
Dept: PSYCHIATRY | Facility: OTHER | Age: 26
End: 2021-04-12
Attending: PSYCHIATRY & NEUROLOGY
Payer: COMMERCIAL

## 2021-04-12 DIAGNOSIS — F33.2 MAJOR DEPRESSIVE DISORDER, RECURRENT SEVERE WITHOUT PSYCHOTIC FEATURES (H): Primary | ICD-10-CM

## 2021-04-12 DIAGNOSIS — F43.10 PTSD (POST-TRAUMATIC STRESS DISORDER): ICD-10-CM

## 2021-04-12 DIAGNOSIS — F41.0 PANIC DISORDER WITHOUT AGORAPHOBIA: ICD-10-CM

## 2021-04-12 PROCEDURE — 99214 OFFICE O/P EST MOD 30 MIN: CPT | Mod: 95 | Performed by: PSYCHIATRY & NEUROLOGY

## 2021-04-12 RX ORDER — VILAZODONE HYDROCHLORIDE 10 MG/1
TABLET ORAL
Qty: 30 TABLET | Refills: 4 | Status: SHIPPED | OUTPATIENT
Start: 2021-04-12 | End: 2021-05-17

## 2021-04-12 RX ORDER — PRAZOSIN HYDROCHLORIDE 1 MG/1
1 CAPSULE ORAL AT BEDTIME
Qty: 30 CAPSULE | Refills: 3 | Status: SHIPPED | OUTPATIENT
Start: 2021-04-12 | End: 2021-05-17 | Stop reason: SINTOL

## 2021-04-12 RX ORDER — DIAZEPAM 10 MG
10 TABLET ORAL DAILY PRN
Qty: 30 TABLET | Refills: 0 | Status: SHIPPED | OUTPATIENT
Start: 2021-04-12 | End: 2021-05-10

## 2021-04-12 ASSESSMENT — ANXIETY QUESTIONNAIRES
GAD7 TOTAL SCORE: 21
2. NOT BEING ABLE TO STOP OR CONTROL WORRYING: NEARLY EVERY DAY
3. WORRYING TOO MUCH ABOUT DIFFERENT THINGS: NEARLY EVERY DAY
6. BECOMING EASILY ANNOYED OR IRRITABLE: NEARLY EVERY DAY
7. FEELING AFRAID AS IF SOMETHING AWFUL MIGHT HAPPEN: NEARLY EVERY DAY
1. FEELING NERVOUS, ANXIOUS, OR ON EDGE: NEARLY EVERY DAY
5. BEING SO RESTLESS THAT IT IS HARD TO SIT STILL: NEARLY EVERY DAY

## 2021-04-12 ASSESSMENT — PATIENT HEALTH QUESTIONNAIRE - PHQ9
SUM OF ALL RESPONSES TO PHQ QUESTIONS 1-9: 21
5. POOR APPETITE OR OVEREATING: NEARLY EVERY DAY

## 2021-04-12 ASSESSMENT — PAIN SCALES - GENERAL: PAINLEVEL: NO PAIN (0)

## 2021-04-12 NOTE — NURSING NOTE
"Chief Complaint   Patient presents with     RECHECK     Telephone visit.  Patient c/o worsening depression and anxiety since the end of January 2021       Initial There were no vitals taken for this visit. Estimated body mass index is 28.73 kg/m  as calculated from the following:    Height as of 10/18/18: 1.676 m (5' 6\").    Weight as of 1/9/20: 80.7 kg (178 lb).  Medication Reconciliation: complete  PATY RYAN LPN    "

## 2021-04-12 NOTE — PROGRESS NOTES
Valorie is a 26 year old who is being evaluated via a billable telephone visit.      What phone number would you like to be contacted at? [phone]  How would you like to obtain your AVS? Elvira     Telephone visit 23 minutes.9 minutes reviewing chart, ordering medications, documenting. Total visit time 32 minutes.    PSYCHIATRY CLINIC PROGRESS NOTE     SUBJECTIVE / INTERIM HISTORY                                                                       :   Children-  Mayco is 7 yo.  Last visit September 2019: Continue Lexapro 10 mg daily and Topamax 25 mg twice daily. DISCONTINUE Klonopin. Start diazepam 10 mg daily prn    - school starts October - all online. 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 no situation she can think of. Can have some good days, but others won't get out of bed. Gets overwhelmed easily.    - does feel diazepam helping  - her and Romego on pretty good terms  - probation after incident with her ex Bf's ex GF    SUBSTANCE USE- reports no issues    SYMPTOMS- nightmares, easily startled, hypervigilance, situations reminscent of trauma cause fear / panic.  Excessive worry, easily overwhelmed, panic attacks.   MEDICAL ROS- Hip / sciatic nerve pain, Migraines  MEDICAL / SURGICAL HISTORY                pregnant [if applicable]--no   Medical Team:     PMD- Dr. Jo Muro    Therapist- [facility]   Patient Active Problem List   Diagnosis     Anxiety state     Migraine     Spasm of muscle     ACP (advance care planning)     Panic disorder without agoraphobia     Recurrent major depressive disorder, in full remission (H)     Chronic, continuous use of opioids     ALLERGY   Bactrim [sulfamethoxazole w-trimethoprim], Morphine hcl, Clindamycin, Nitrofurantoin, Nitrofurantoin monohydrate macrocrystals, and Loracarbef  MEDICATIONS                                                                                              Current Outpatient Medications   Medication Sig     Acetaminophen (TYLENOL PO) Take 1,000 mg by mouth every 6 hours as needed for mild pain or fever     diazepam (VALIUM) 10 MG tablet Take 1 tablet (10 mg) by mouth daily as needed for anxiety * due for follow-up appointment [phone]     escitalopram (LEXAPRO) 10 MG tablet Take 1 tablet (10 mg) by mouth daily     hydrOXYzine (VISTARIL) 25 MG capsule TAKE 1 TO 2 CAPSULES(25 TO 50 MG) BY MOUTH THREE TIMES DAILY AS NEEDED FOR ITCHING     ISIBLOOM 0.15-30 MG-MCG tablet TAKE 1 TABLET BY MOUTH DAILY     SUMAtriptan (IMITREX) 100 MG tablet Take 1 tablet (100 mg) by mouth at onset of headache for migraine May repeat in 2 hours if needed: max 2/day; average number of headaches monthly     topiramate (TOPAMAX) 50 MG tablet Take 1 tablet (50 mg) by mouth 2 times daily TAKE 1 TABLET(50 MG) BY MOUTH TWICE DAILY     valACYclovir (VALTREX) 500 MG tablet TAKE 1 TABLET(500 MG) BY MOUTH DAILY     nabumetone (RELAFEN) 500 MG tablet Take 1 tablet (500 mg) by mouth 2 times daily (Patient not taking: Reported on 4/12/2021)     traMADol (ULTRAM) 50 MG tablet TAKE 1-2 TABLETS(50-100MG) BY MOUTH EVERY 6 HOURS AS NEEDED FOR SEVERE PAIN (Patient not taking: Reported on 4/12/2021)     No current facility-administered medications for this visit.        VITALS   There were no vitals taken for this visit.     PHQ9                       PHQ-9 SCORE 8/27/2019 9/18/2019 4/12/2021   PHQ-9 Total Score - - -   PHQ-9 Total Score 15 11 21       MENTAL STATUS EXAM                                                                                       Mood  anxious and depressed  Thought process, including associations, was unremarkable and thought content was devoid of suicidal and homicidal ideation and psychotic thought. No hallucinations. Insight was good. Judgment was intact and adequate for safety. Fund of knowledge was intact. Pt demonstrates no obvious problems with attention,  concentration, language, recent or remote memory although these were not formally tested.     ASSESSMENT                                                                                                      HISTORICAL:  Initial psych note 2/13/15       Notes: Buspar ineffective. Zoloft. Effexor up to 75 : ineffective and made her feel foggy and sick.   she stopped it. Paxil ineffective. Gabapentin : sedation. Past citalopram in med section (2013). Propranolol 10 mg BID ineffective. Lexapro was on 10 mg then ran out in between visits  Valorie Awad is a 25 yo with panic disorder without agoraphobia, MDD, recurrent,and NITZA and PTSD. I haven't had a visit with Elvira for ~1-1/2. Since Jan thus 3 months: Depression to point in bed for hours.. PTSD - especially night hypervigilance is struggling and not sleeping at night hence ends up sleeping during the dayToday we discussed prazosin use in those with trauma history. We reviewed its antihypertensive effect thus watch for any lightheadedness. She has been out of Lexapro for couple weeks.. didn't feel was helping. We reviewed options and agreed on having her try Viibryd. We reviewed most common SEs.. she notes she is still taking Topamax but doesn't feel it's helping any and she has been sleeping too much thus will discontinue.    TREATMENT RISK STATEMENT:  The risks, benefits, alternatives and potential adverse effects have been explained and are understood by the pt.  The pt agrees to the treatment plan with the ability to do so.  Discussion of specific concerns included- potential SEs meds.  The pt knows to call the clinic for any problems or access emergency care if needed.   There are no medical considerations relevant to treatment, as noted above.  Substance use is not a problem as noted above.         DIAGNOSES            PTSD   Panic disorder without agoraphobia  Major depressive disorder, recurrent, severe  NITZA      PLAN                                                                                                                            1) MEDICATIONS: She ran out of Lexapro 10 mg daily couple weeks ago. She is taking Topamax 50 mg twice -> discontinue. Continue diazepam 10 mg daily prn. Start viibryd 5 mg daily x 7 days; then increase to 10 mg daily. Start prazosin 1 mg HS    2) THERAPY: No Change. She was working with a therapist at [facility]    3) LABS: None    4) PT MONITOR [call for probs]: worsening sx, side effects, if develops SI    5) REFERRALS [CD tx, medical, tests other]: None    6)  RTC: ~s1 month

## 2021-04-13 ENCOUNTER — TELEPHONE (OUTPATIENT)
Dept: PSYCHIATRY | Facility: OTHER | Age: 26
End: 2021-04-13

## 2021-04-13 ASSESSMENT — ANXIETY QUESTIONNAIRES: GAD7 TOTAL SCORE: 21

## 2021-04-13 NOTE — TELEPHONE ENCOUNTER
Received a PA from [employer] for Viibryd 10mg tablets. Submitted on CMM. Waiting for a response.

## 2021-04-14 NOTE — TELEPHONE ENCOUNTER
Received a DENIAL from [facility] for Viibryd 10mg tablets.       Forms scanned to [facility].

## 2021-04-21 ENCOUNTER — TELEPHONE (OUTPATIENT)
Dept: PSYCHIATRY | Facility: OTHER | Age: 26
End: 2021-04-21

## 2021-04-21 NOTE — TELEPHONE ENCOUNTER
Patient contacted.  The Viibryd was denied.  Patient is wondering what other medications she could try for depression.  She has been taking Prazosin and Valium, but she cannot depend on these medications as they make her so tired.  Next f/u appt is on 5-17-21.  Thank you, please advise.

## 2021-04-21 NOTE — TELEPHONE ENCOUNTER
Patient reports having no medication and the vybrid was denied. She is wondering what can be done about her medications.

## 2021-04-28 ENCOUNTER — TELEPHONE (OUTPATIENT)
Dept: PSYCHIATRY | Facility: OTHER | Age: 26
End: 2021-04-28

## 2021-04-28 DIAGNOSIS — F33.2 MAJOR DEPRESSIVE DISORDER, RECURRENT SEVERE WITHOUT PSYCHOTIC FEATURES (H): Primary | ICD-10-CM

## 2021-04-28 NOTE — TELEPHONE ENCOUNTER
Received incoming call from patient.  She is wondering what other medication she can try for depression.  We have received a denial from her insurance for Viibryd.  She reports not able to get out of bed d/t depression.  Next f/u appt is on 5-17-21.  Thank you, please advise.

## 2021-04-30 RX ORDER — FLUOXETINE 10 MG/1
10 CAPSULE ORAL DAILY
Qty: 30 CAPSULE | Refills: 4 | Status: SHIPPED | OUTPATIENT
Start: 2021-04-30 | End: 2021-05-17 | Stop reason: ALTCHOICE

## 2021-05-04 DIAGNOSIS — Z30.011 ENCOUNTER FOR INITIAL PRESCRIPTION OF CONTRACEPTIVE PILLS: ICD-10-CM

## 2021-05-04 RX ORDER — DESOGESTREL AND ETHINYL ESTRADIOL 0.15-0.03
KIT ORAL
Qty: 84 TABLET | Refills: 0 | Status: SHIPPED | OUTPATIENT
Start: 2021-05-04 | End: 2021-08-02

## 2021-05-04 NOTE — TELEPHONE ENCOUNTER
ISIBLOOM 0.15-30 MG-MCG tablet      Last Written Prescription Date:  2/14/2021  Last Fill Quantity: 84,   # refills: 0  Last Office Visit: 1/9/2020  Future Office visit:

## 2021-05-07 ENCOUNTER — NURSE TRIAGE (OUTPATIENT)
Dept: FAMILY MEDICINE | Facility: OTHER | Age: 26
End: 2021-05-07

## 2021-05-07 NOTE — TELEPHONE ENCOUNTER
Pt calling and is asking provider for a letter for an emotional support animal.    Please call back at [phone].      Sara Minor RN

## 2021-05-07 NOTE — LETTER
5/07/2021         To Whom It May Concern,           I work with Valorie Awad in my psychiatry clinic at [facility]. I am writing to note that her dog serves as an emotional support animal -- as they help Valorie with her mental health issues. I ask this considered in regards to Valorie's living situation. I would be happy to provide additional information and / or answer questions.        I can be reached at [phone] and our clinic fax is [phone].        Sincerely,     Jyoti Leahy MD

## 2021-05-07 NOTE — TELEPHONE ENCOUNTER
Contacted patient to get more information.  agrees on letter. Letter sent via [employer] to patient.     Mary Womack, RN-BSN  Care Coordination, Behavioral Health

## 2021-05-10 DIAGNOSIS — F41.0 PANIC DISORDER WITHOUT AGORAPHOBIA: ICD-10-CM

## 2021-05-10 RX ORDER — DIAZEPAM 10 MG
10 TABLET ORAL DAILY PRN
Qty: 30 TABLET | Refills: 1 | Status: SHIPPED | OUTPATIENT
Start: 2021-05-10 | End: 2021-08-17

## 2021-05-10 NOTE — TELEPHONE ENCOUNTER
Diazepam 10 mg      Last Written Prescription Date:  4/12/21  Last Fill Quantity: 30,   # refills: 0  Last Office Visit: 4/12/21  Future Office visit:   5/17/21  Routing refill request to provider for review/approval because:  Drug not on the [facility], [facility] or [facility] refill protocol or controlled substance

## 2021-05-17 ENCOUNTER — VIRTUAL VISIT (OUTPATIENT)
Dept: PSYCHIATRY | Facility: OTHER | Age: 26
End: 2021-05-17
Attending: PSYCHIATRY & NEUROLOGY
Payer: COMMERCIAL

## 2021-05-17 DIAGNOSIS — F33.2 MAJOR DEPRESSIVE DISORDER, RECURRENT SEVERE WITHOUT PSYCHOTIC FEATURES (H): ICD-10-CM

## 2021-05-17 PROCEDURE — 99214 OFFICE O/P EST MOD 30 MIN: CPT | Mod: 95 | Performed by: PSYCHIATRY & NEUROLOGY

## 2021-05-17 RX ORDER — FLUOXETINE 10 MG/1
10 CAPSULE ORAL DAILY
Qty: 30 CAPSULE | Refills: 4 | COMMUNITY
Start: 2021-05-17 | End: 2021-07-20

## 2021-05-17 ASSESSMENT — ANXIETY QUESTIONNAIRES
3. WORRYING TOO MUCH ABOUT DIFFERENT THINGS: NEARLY EVERY DAY
5. BEING SO RESTLESS THAT IT IS HARD TO SIT STILL: NOT AT ALL
GAD7 TOTAL SCORE: 15
6. BECOMING EASILY ANNOYED OR IRRITABLE: NEARLY EVERY DAY
2. NOT BEING ABLE TO STOP OR CONTROL WORRYING: NEARLY EVERY DAY
1. FEELING NERVOUS, ANXIOUS, OR ON EDGE: NEARLY EVERY DAY
7. FEELING AFRAID AS IF SOMETHING AWFUL MIGHT HAPPEN: NOT AT ALL

## 2021-05-17 ASSESSMENT — PATIENT HEALTH QUESTIONNAIRE - PHQ9
SUM OF ALL RESPONSES TO PHQ QUESTIONS 1-9: 21
5. POOR APPETITE OR OVEREATING: NEARLY EVERY DAY

## 2021-05-17 ASSESSMENT — PAIN SCALES - GENERAL: PAINLEVEL: NO PAIN (0)

## 2021-05-17 NOTE — NURSING NOTE
"Chief Complaint   Patient presents with     RECHECK     Telephone visit.  Patient stopped taking Prazosin d/t not working well.  Patient is not sure how the Prozac is working d/t just started taking not to long ago       Initial There were no vitals taken for this visit. Estimated body mass index is 28.73 kg/m  as calculated from the following:    Height as of 10/18/18: 1.676 m (5' 6\").    Weight as of 1/9/20: 80.7 kg (178 lb).  Medication Reconciliation: complete  PATY RYAN LPN    "

## 2021-05-17 NOTE — PROGRESS NOTES
"Valorie is a 26 year old who is being evaluated via a billable telephone visit.      What phone number would you like to be contacted at? [phone]  How would you like to obtain your AVS? Elvira     Telephone visit 11 minutes.4 minutes reviewing chart, ordering medications, documenting. Total visit time 15 minutes.    PSYCHIATRY CLINIC PROGRESS NOTE     SUBJECTIVE / INTERIM HISTORY                                                                       :   Children-  Mayco is 7 yo.  Last visit April '21: She ran out of Lexapro 10 mg daily couple weeks ago. She is taking Topamax 50 mg twice -> discontinue. Continue diazepam 10 mg daily prn. Start viibryd 5 mg daily x 7 days; then increase to 10 mg daily. Start prazosin 1 mg HS  - prazosin took for couple nights then stopped it as she was waking up every couple hours  - fluoxetine \"no appetite at all\". First couple days felt bit off.    - NOT spending majority of day in bed like she was.  - Fredis doing well, smart kiddo to point he gets bored in school.   - feeling little better since the last time  - school starts October - all online. 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  Excessive worry, easily overwhelmed, panic attacks.   MEDICAL ROS- Hip / sciatic nerve pain, Migraines  MEDICAL / SURGICAL HISTORY                pregnant [if applicable]--no   Medical Team:     PMD- Dr. Jo Muro    Therapist- [facility]   Patient Active Problem List   Diagnosis     Anxiety state     Migraine     Spasm of muscle     ACP (advance care planning)     Panic disorder without " agoraphobia     Recurrent major depressive disorder, in full remission (H)     Chronic, continuous use of opioids     ALLERGY   Bactrim [sulfamethoxazole w-trimethoprim], Morphine hcl, Clindamycin, Nitrofurantoin, Nitrofurantoin monohydrate macrocrystals, and Loracarbef  MEDICATIONS                                                                                             Current Outpatient Medications   Medication Sig     diazepam (VALIUM) 10 MG tablet Take 1 tablet (10 mg) by mouth daily as needed for anxiety     hydrOXYzine (VISTARIL) 25 MG capsule TAKE 1 TO 2 CAPSULES(25 TO 50 MG) BY MOUTH THREE TIMES DAILY AS NEEDED FOR ITCHING     ISIBLOOM 0.15-30 MG-MCG tablet TAKE 1 TABLET BY MOUTH DAILY     SUMAtriptan (IMITREX) 100 MG tablet Take 1 tablet (100 mg) by mouth at onset of headache for migraine May repeat in 2 hours if needed: max 2/day; average number of headaches monthly     traMADol (ULTRAM) 50 MG tablet TAKE 1-2 TABLETS(50-100MG) BY MOUTH EVERY 6 HOURS AS NEEDED FOR SEVERE PAIN     valACYclovir (VALTREX) 500 MG tablet TAKE 1 TABLET(500 MG) BY MOUTH DAILY     prazosin (MINIPRESS) 1 MG capsule Take 1 capsule (1 mg) by mouth At Bedtime (Patient not taking: Reported on 5/17/2021)     No current facility-administered medications for this visit.        VITALS   There were no vitals taken for this visit.     PHQ9                       PHQ-9 SCORE 9/18/2019 4/12/2021 5/17/2021   PHQ-9 Total Score - - -   PHQ-9 Total Score 11 21 21       MENTAL STATUS EXAM                                                                                       Mood  anxious and depressed  Thought process, including associations, was unremarkable and thought content was devoid of suicidal and homicidal ideation and psychotic thought. No hallucinations. Insight was good. Judgment was intact and adequate for safety. Fund of knowledge was intact. Pt demonstrates no obvious problems with attention, concentration, language, recent or remote  memory although these were not formally tested.     ASSESSMENT                                                                                                      HISTORICAL:  Initial psych note 2/13/15       Notes: Buspar ineffective. Zoloft. Effexor up to 75 : ineffective and made her feel foggy and sick.   she stopped it. Paxil ineffective. Gabapentin : sedation. Past citalopram in med section (2013). Propranolol 10 mg BID ineffective. Lexapro was on 10 mg then ran out in between visits. Prazosin; insomnia . topamax fatigue, ineffective    Valorie Awad is a 27 yo with panic disorder, MDD, recurrent,and NITZA and PTSD. We were going to try Viibryd and wasn't covered. Interim last visit we started Prozac, bit hard to say how well working given she has only been on it for 2 weeks but is a good sign that Valorie isn't spending as much time in bed and she is tolerating it whereas she ends up with SEs frequently 2/2 meds. Prazosin made her wake up frequently so she stopped taking it. We agreed to give Prozac more time to work and reviewed adequate time for med trial 4-6 weeks thus we plan on RTC in 4 weeks.    TREATMENT RISK STATEMENT:  The risks, benefits, alternatives and potential adverse effects have been explained and are understood by the pt.  The pt agrees to the treatment plan with the ability to do so.  Discussion of specific concerns included- potential SEs meds.  The pt knows to call the clinic for any problems or access emergency care if needed.   There are no medical considerations relevant to treatment, as noted above.  Substance use is not a problem as noted above.         DIAGNOSES            PTSD   Panic disorder without agoraphobia  Major depressive disorder, recurrent, severe  NITZA      PLAN                                                                                                                           1) MEDICATIONS: Continue fluoxetine 10 mg daily. Continue diazepam 10 mg daily prn filled  5/10/21 with 1 refill.  Discontinued prazosin 1 mg HS    2) THERAPY: No Change. She was working with a therapist at [facility]    3) LABS: None    4) PT MONITOR [call for probs]: worsening sx, side effects, if develops SI    5) REFERRALS [CD tx, medical, tests other]: None    6)  RTC: ~1 month

## 2021-05-18 ASSESSMENT — ANXIETY QUESTIONNAIRES: GAD7 TOTAL SCORE: 15

## 2021-06-14 ENCOUNTER — VIRTUAL VISIT (OUTPATIENT)
Dept: PSYCHIATRY | Facility: OTHER | Age: 26
End: 2021-06-14
Attending: PSYCHIATRY & NEUROLOGY
Payer: COMMERCIAL

## 2021-06-14 ENCOUNTER — TELEPHONE (OUTPATIENT)
Dept: PSYCHIATRY | Facility: OTHER | Age: 26
End: 2021-06-14

## 2021-06-14 DIAGNOSIS — F33.2 MAJOR DEPRESSIVE DISORDER, RECURRENT SEVERE WITHOUT PSYCHOTIC FEATURES (H): Primary | ICD-10-CM

## 2021-06-14 PROCEDURE — 99214 OFFICE O/P EST MOD 30 MIN: CPT | Mod: 95 | Performed by: PSYCHIATRY & NEUROLOGY

## 2021-06-14 RX ORDER — FLUOXETINE 10 MG/1
10 CAPSULE ORAL DAILY
Qty: 30 CAPSULE | Refills: 4 | Status: SHIPPED | OUTPATIENT
Start: 2021-06-14 | End: 2021-12-01

## 2021-06-14 ASSESSMENT — PATIENT HEALTH QUESTIONNAIRE - PHQ9
SUM OF ALL RESPONSES TO PHQ QUESTIONS 1-9: 15
5. POOR APPETITE OR OVEREATING: NEARLY EVERY DAY

## 2021-06-14 ASSESSMENT — ANXIETY QUESTIONNAIRES
1. FEELING NERVOUS, ANXIOUS, OR ON EDGE: NOT AT ALL
5. BEING SO RESTLESS THAT IT IS HARD TO SIT STILL: NOT AT ALL
7. FEELING AFRAID AS IF SOMETHING AWFUL MIGHT HAPPEN: NOT AT ALL
6. BECOMING EASILY ANNOYED OR IRRITABLE: NEARLY EVERY DAY
GAD7 TOTAL SCORE: 6
2. NOT BEING ABLE TO STOP OR CONTROL WORRYING: NOT AT ALL
3. WORRYING TOO MUCH ABOUT DIFFERENT THINGS: NOT AT ALL

## 2021-06-14 ASSESSMENT — PAIN SCALES - GENERAL: PAINLEVEL: NO PAIN (0)

## 2021-06-14 NOTE — PROGRESS NOTES
"Valorie is a 26 year old who is being evaluated via a billable telephone visit.      What phone number would you like to be contacted at? [phone]  How would you like to obtain your AVS? Elvira     Telephone visit14 minutes. 4 minutes reviewing chart, ordering medications, documenting. Total visit time 18 minutes.    PSYCHIATRY CLINIC PROGRESS NOTE     SUBJECTIVE / INTERIM HISTORY                                                                       :   Children-  Floraion is 9 yo.  Last visit 5/17/21: Continue fluoxetine 10 mg daily. Continue diazepam 10 mg daily prn filled 5/10/21 with 1 refill.  Discontinued prazosin 1 mg HS    - Fredis done wi school.  - \"things are going better\" feels a change \"I don't worry as much.\"  - doing more. Going to friends' houses. Going to go to a water park wi Fredis.  - prazosin took for couple nights then stopped it as she was waking up every couple hours  - \"I still have no appetite\". Got protein shakes. Takes fluoxetine in morning.  - school starts October - all online. 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 Sx of anxiety have been better:  Excessive worry, easily overwhelmed, panic attacks.   MEDICAL ROS- low appetite, some weight loss, sometimes nausea. Hip / sciatic nerve pain, Migraines  MEDICAL / SURGICAL HISTORY                pregnant [if applicable]--no   Medical Team:     PMD- Dr. Jo Muro    Therapist- [facility]   Patient Active Problem List   Diagnosis     Anxiety state     Migraine     Spasm of muscle     ACP (advance care planning)     Panic disorder without agoraphobia     Recurrent major depressive disorder, in full remission (H)     Chronic, continuous use of opioids     ALLERGY   Bactrim [sulfamethoxazole w-trimethoprim], Morphine hcl, Clindamycin, " Nitrofurantoin, Nitrofurantoin monohydrate macrocrystals, and Loracarbef  MEDICATIONS                                                                                             Current Outpatient Medications   Medication Sig     diazepam (VALIUM) 10 MG tablet Take 1 tablet (10 mg) by mouth daily as needed for anxiety     FLUoxetine (PROZAC) 10 MG capsule Take 1 capsule (10 mg) by mouth daily     hydrOXYzine (VISTARIL) 25 MG capsule TAKE 1 TO 2 CAPSULES(25 TO 50 MG) BY MOUTH THREE TIMES DAILY AS NEEDED FOR ITCHING     ISIBLOOM 0.15-30 MG-MCG tablet TAKE 1 TABLET BY MOUTH DAILY     valACYclovir (VALTREX) 500 MG tablet TAKE 1 TABLET(500 MG) BY MOUTH DAILY     SUMAtriptan (IMITREX) 100 MG tablet Take 1 tablet (100 mg) by mouth at onset of headache for migraine May repeat in 2 hours if needed: max 2/day; average number of headaches monthly (Patient not taking: Reported on 6/14/2021)     No current facility-administered medications for this visit.        VITALS   There were no vitals taken for this visit.     PHQ9                       PHQ-9 SCORE 4/12/2021 5/17/2021 6/14/2021   PHQ-9 Total Score - - -   PHQ-9 Total Score 21 21 15       MENTAL STATUS EXAM                                                                                       Mood  anxious and depressed  Thought process, including associations, was unremarkable and thought content was devoid of suicidal and homicidal ideation and psychotic thought. No hallucinations. Insight was good. Judgment was intact and adequate for safety. Fund of knowledge was intact. Pt demonstrates no obvious problems with attention, concentration, language, recent or remote memory although these were not formally tested.     ASSESSMENT                                                                                                      HISTORICAL:  Initial psych note 2/13/15       Notes: Buspar ineffective. Zoloft. Effexor up to 75 : ineffective and made her feel foggy and sick.    she stopped it. Paxil ineffective. Gabapentin : sedation. Past citalopram in med section (2013). Propranolol 10 mg BID ineffective. Lexapro was on 10 mg then ran out in between visits. Prazosin; insomnia . topamax fatigue, ineffective    Valorie Awad is a 27 yo with panic disorder, MDD, recurrent,and NIZTA and PTSD. We were going to try Viibryd and wasn't covered. We ended up starting Prozac. IS helping! This is first med in quite awhile that is helping. She is getting low appetite, some weight loss however we feel benefits outweigh risks of continuing the fluoxetine at this time. We talked about trying to eat before she takes Prozac of which she is already doing. We're hesitant to have her take it at night as we have concern seems it is bit activating for her and she already struggles with insomnia. We agreed today to stick with the 10 mg and refilled and touch base again in ~2 months.    TREATMENT RISK STATEMENT:  The risks, benefits, alternatives and potential adverse effects have been explained and are understood by the pt.  The pt agrees to the treatment plan with the ability to do so.  Discussion of specific concerns included- potential SEs meds.  The pt knows to call the clinic for any problems or access emergency care if needed.   There are no medical considerations relevant to treatment, as noted above.  Substance use is not a problem as noted above.         DIAGNOSES            PTSD   Panic disorder without agoraphobia  Major depressive disorder, recurrent, moderate  NITZA      PLAN                                                                                                                           1) MEDICATIONS: Continue fluoxetine 10 mg daily. Continue diazepam 10 mg daily prn filled 5/10/21 with 1 refill.  Discontinued prazosin 1 mg HS    2) THERAPY: No Change. She was working with a therapist at [facility]    3) LABS: None    4) PT MONITOR [call for probs]: worsening sx, side effects, if develops  SI    5) REFERRALS [CD tx, medical, tests other]: None    6)  RTC: ~2 months

## 2021-06-14 NOTE — TELEPHONE ENCOUNTER
6/14/21**Contacted patient to schedule 2 month follow up appointment. No voicemail set up at the time of call.  (Linda approx. 8/14/21).    Yonathan ZHOU

## 2021-06-14 NOTE — NURSING NOTE
"Chief Complaint   Patient presents with     RECHECK     Telephone visit.       Initial There were no vitals taken for this visit. Estimated body mass index is 28.73 kg/m  as calculated from the following:    Height as of 10/18/18: 1.676 m (5' 6\").    Weight as of 1/9/20: 80.7 kg (178 lb).  Medication Reconciliation: complete  PATY RYAN LPN    "

## 2021-06-15 ASSESSMENT — ANXIETY QUESTIONNAIRES: GAD7 TOTAL SCORE: 6

## 2021-06-28 ENCOUNTER — HOSPITAL ENCOUNTER (EMERGENCY)
Facility: HOSPITAL | Age: 26
Discharge: HOME | End: 2021-06-28
Attending: EMERGENCY MEDICINE | Admitting: EMERGENCY MEDICINE
Payer: COMMERCIAL

## 2021-06-28 ENCOUNTER — APPOINTMENT (OUTPATIENT)
Dept: CT IMAGING | Facility: HOSPITAL | Age: 26
End: 2021-06-28
Attending: EMERGENCY MEDICINE
Payer: COMMERCIAL

## 2021-06-28 VITALS
DIASTOLIC BLOOD PRESSURE: 70 MMHG | HEART RATE: 84 BPM | TEMPERATURE: 98.2 F | SYSTOLIC BLOOD PRESSURE: 120 MMHG | OXYGEN SATURATION: 100 % | RESPIRATION RATE: 16 BRPM

## 2021-06-28 DIAGNOSIS — N39.0 COMPLICATED UTI (URINARY TRACT INFECTION): ICD-10-CM

## 2021-06-28 DIAGNOSIS — N20.0 KIDNEY STONE: ICD-10-CM

## 2021-06-28 DIAGNOSIS — R31.9 HEMATURIA, UNSPECIFIED TYPE: ICD-10-CM

## 2021-06-28 LAB
ALBUMIN UR-MCNC: 300 MG/DL
ANION GAP SERPL CALCULATED.3IONS-SCNC: 5 MMOL/L (ref 3–14)
APPEARANCE UR: ABNORMAL
BACTERIA #/AREA URNS HPF: ABNORMAL /HPF
BASOPHILS # BLD AUTO: 0 10E9/L (ref 0–0.2)
BASOPHILS NFR BLD AUTO: 0.3 %
BILIRUB UR QL STRIP: NEGATIVE
BUN SERPL-MCNC: 13 MG/DL (ref 7–30)
CALCIUM SERPL-MCNC: 8.7 MG/DL (ref 8.5–10.1)
CHLORIDE SERPL-SCNC: 108 MMOL/L (ref 94–109)
CO2 SERPL-SCNC: 27 MMOL/L (ref 20–32)
COLOR UR AUTO: ABNORMAL
CREAT SERPL-MCNC: 0.72 MG/DL (ref 0.52–1.04)
DIFFERENTIAL METHOD BLD: NORMAL
EOSINOPHIL # BLD AUTO: 0.2 10E9/L (ref 0–0.7)
EOSINOPHIL NFR BLD AUTO: 2 %
ERYTHROCYTE [DISTWIDTH] IN BLOOD BY AUTOMATED COUNT: 12.1 % (ref 10–15)
GFR SERPL CREATININE-BSD FRML MDRD: >90 ML/MIN/{1.73_M2}
GLUCOSE SERPL-MCNC: 88 MG/DL (ref 70–99)
GLUCOSE UR STRIP-MCNC: 30 MG/DL
HCG UR QL: NEGATIVE
HCT VFR BLD AUTO: 36.9 % (ref 35–47)
HGB BLD-MCNC: 12.9 G/DL (ref 11.7–15.7)
HGB UR QL STRIP: ABNORMAL
IMM GRANULOCYTES # BLD: 0 10E9/L (ref 0–0.4)
IMM GRANULOCYTES NFR BLD: 0.3 %
KETONES UR STRIP-MCNC: 5 MG/DL
LEUKOCYTE ESTERASE UR QL STRIP: ABNORMAL
LYMPHOCYTES # BLD AUTO: 2 10E9/L (ref 0.8–5.3)
LYMPHOCYTES NFR BLD AUTO: 18.5 %
MCH RBC QN AUTO: 30.9 PG (ref 26.5–33)
MCHC RBC AUTO-ENTMCNC: 35 G/DL (ref 31.5–36.5)
MCV RBC AUTO: 88 FL (ref 78–100)
MONOCYTES # BLD AUTO: 0.5 10E9/L (ref 0–1.3)
MONOCYTES NFR BLD AUTO: 4.5 %
NEUTROPHILS # BLD AUTO: 8 10E9/L (ref 1.6–8.3)
NEUTROPHILS NFR BLD AUTO: 74.4 %
NITRATE UR QL: NEGATIVE
NRBC # BLD AUTO: 0 10*3/UL
NRBC BLD AUTO-RTO: 0 /100
PH UR STRIP: 6.5 PH (ref 4.7–8)
PLATELET # BLD AUTO: 278 10E9/L (ref 150–450)
POTASSIUM SERPL-SCNC: 3.7 MMOL/L (ref 3.4–5.3)
RBC # BLD AUTO: 4.18 10E12/L (ref 3.8–5.2)
RBC #/AREA URNS AUTO: >182 /HPF (ref 0–2)
SODIUM SERPL-SCNC: 140 MMOL/L (ref 133–144)
SOURCE: ABNORMAL
SP GR UR STRIP: 1.03 (ref 1–1.03)
SQUAMOUS #/AREA URNS AUTO: 13 /HPF (ref 0–1)
UROBILINOGEN UR STRIP-MCNC: 2 MG/DL (ref 0–2)
WBC # BLD AUTO: 10.8 10E9/L (ref 4–11)
WBC #/AREA URNS AUTO: 156 /HPF (ref 0–5)

## 2021-06-28 PROCEDURE — 80048 BASIC METABOLIC PNL TOTAL CA: CPT | Performed by: EMERGENCY MEDICINE

## 2021-06-28 PROCEDURE — 74176 CT ABD & PELVIS W/O CONTRAST: CPT

## 2021-06-28 PROCEDURE — 99284 EMERGENCY DEPT VISIT MOD MDM: CPT | Performed by: EMERGENCY MEDICINE

## 2021-06-28 PROCEDURE — 81001 URINALYSIS AUTO W/SCOPE: CPT | Performed by: EMERGENCY MEDICINE

## 2021-06-28 PROCEDURE — 36415 COLL VENOUS BLD VENIPUNCTURE: CPT

## 2021-06-28 PROCEDURE — 99284 EMERGENCY DEPT VISIT MOD MDM: CPT | Mod: 25

## 2021-06-28 PROCEDURE — 81025 URINE PREGNANCY TEST: CPT | Performed by: EMERGENCY MEDICINE

## 2021-06-28 PROCEDURE — 258N000003 HC RX IP 258 OP 636: Performed by: EMERGENCY MEDICINE

## 2021-06-28 PROCEDURE — 85025 COMPLETE CBC W/AUTO DIFF WBC: CPT | Performed by: EMERGENCY MEDICINE

## 2021-06-28 RX ORDER — CIPROFLOXACIN 250 MG/1
250 TABLET, FILM COATED ORAL 2 TIMES DAILY
Qty: 6 TABLET | Refills: 0 | Status: SHIPPED | OUTPATIENT
Start: 2021-06-28 | End: 2023-06-19

## 2021-06-28 RX ORDER — KETOROLAC TROMETHAMINE 10 MG/1
10 TABLET, FILM COATED ORAL EVERY 6 HOURS PRN
Qty: 20 TABLET | Refills: 0 | Status: SHIPPED | OUTPATIENT
Start: 2021-06-28 | End: 2021-07-20

## 2021-06-28 RX ORDER — SODIUM CHLORIDE 9 MG/ML
INJECTION, SOLUTION INTRAVENOUS CONTINUOUS
Status: DISCONTINUED | OUTPATIENT
Start: 2021-06-28 | End: 2021-06-28 | Stop reason: HOSPADM

## 2021-06-28 RX ADMIN — SODIUM CHLORIDE 1000 ML: 9 INJECTION, SOLUTION INTRAVENOUS at 13:10

## 2021-06-28 ASSESSMENT — ENCOUNTER SYMPTOMS
CONSTITUTIONAL NEGATIVE: 1
EYES NEGATIVE: 1
LIGHT-HEADEDNESS: 1
DYSURIA: 1
ABDOMINAL PAIN: 1
MUSCULOSKELETAL NEGATIVE: 1
HEMATURIA: 1
CARDIOVASCULAR NEGATIVE: 1
RESPIRATORY NEGATIVE: 1
FLANK PAIN: 0

## 2021-06-28 NOTE — ED NOTES
Pt comes into ED today due to fede red blood in her urine that started yesterday. States she is having some pelvic pressure and frequency as well. States that she has not had her period in years due to the type of birth control pill she is on. States that she is sexually active and is unsure if she would have a STI. States she has a hx of kidney infections and always has a slight amount of blood in her urine but nothing like this. States she feels like she had a fever this AM. Reports being somewhat dizzy. Denies SOB or chest pain. Monitors placed. UA sent.

## 2021-06-28 NOTE — ED PROVIDER NOTES
"  History     Chief Complaint   Patient presents with     Hematuria     HPI  Valorie Awad is a 26 year old female who comes to the emergency department complaining of hematuria and crampy lower abdominal pain.  She states she does have a history of urinary tract and kidney infections.  She states she had a \"bladder dilated\" when she was a child.  She is not currently having flank pain.  She is states she does feel slightly lightheaded.  She has had no nausea or vomiting.  She denies diarrhea and denies constipation.  She states she does not feel short of breath.  He states that she does not have a history of kidney stones.    Allergies:  Allergies   Allergen Reactions     Bactrim [Sulfamethoxazole W-Trimethoprim] Rash     Morphine Hcl Nausea and Vomiting     Clindamycin Other (See Comments)     Thrush       Nitrofurantoin      Macrobid      Nitrofurantoin Monohydrate Macrocrystals      Macrobid      Loracarbef Rash     Lorabid       Problem List:    Patient Active Problem List    Diagnosis Date Noted     Chronic, continuous use of opioids 09/23/2019     Priority: Medium     Panic disorder without agoraphobia 10/18/2018     Priority: Medium     Patient is followed by  for ongoing prescription of benzodiazepines.  All refills should be approved by this provider, or covering partner.    Medication(s): Valium 10 mg.   Maximum quantity per month: 30  Clinic visit frequency required: Q 3 months     Controlled substance agreement on file: Yes       Date(s): 9.18.19  Benzodiazepine use reviewed by psychiatry:  Yes       Date(s): 9.18.19    Last [place] website verification:  done on 9.18.19  [email or web address]           Recurrent major depressive disorder, in full remission (H) 10/18/2018     Priority: Medium     ACP (advance care planning) 03/22/2017     Priority: Medium     Advance Care Planning 7/6/2017: ACP Review of Chart / Resources Provided:  Reviewed chart for advance care plan.  Valorie ZHOU" Delmi has no plan or code status on file. Discussed available resources and provided with information.   Added by KARLY CORBETT                 Spasm of muscle 08/12/2015     Priority: Medium     Migraine 10/10/2007     Priority: Medium     Problem list name updated by automated process. Provider to review       Anxiety state 09/20/2001     Priority: Medium     Problem list name updated by automated process. Provider to review          Past Medical History:    Past Medical History:   Diagnosis Date     Abnormal Pap smear      Anxiety 9/20/2001     Marijuana use      Migraine headaches 10/10/2007     Supervision of other normal pregnancy        Past Surgical History:    Past Surgical History:   Procedure Laterality Date     Comminuted fracture elbow Left 10/25/2013    lauren placed     CYSTOSCOPY      bladder distention     TONSILLECTOMY         Family History:    Family History   Problem Relation Age of Onset     Other - See Comments Father         alcoholism     Depression Father      Mental Illness Father      Depression Mother      Irritable Bowel Syndrome Mother      Mental Illness Mother      Other - See Comments Mother         migraine headache     Other - See Comments Maternal Grandmother         blood disease     Lipids Maternal Grandmother         hyperlipidemia     Thyroid Disease Maternal Grandmother      Diabetes No family hx of      Asthma No family hx of        Social History:  Marital Status:  Single [1]  Social History     Tobacco Use     Smoking status: Never Smoker     Smokeless tobacco: Never Used   Substance Use Topics     Alcohol use: Yes     Alcohol/week: 0.0 standard drinks     Comment: social     Drug use: No        Medications:    ciprofloxacin (CIPRO) 250 MG tablet  ketorolac (TORADOL) 10 MG tablet  diazepam (VALIUM) 10 MG tablet  FLUoxetine (PROZAC) 10 MG capsule  FLUoxetine (PROZAC) 10 MG capsule  hydrOXYzine (VISTARIL) 25 MG capsule  ISIBLOOM 0.15-30 MG-MCG tablet  SUMAtriptan (IMITREX)  100 MG tablet  valACYclovir (VALTREX) 500 MG tablet          Review of Systems   Constitutional: Negative.    HENT: Negative.    Eyes: Negative.    Respiratory: Negative.    Cardiovascular: Negative.    Gastrointestinal: Positive for abdominal pain.   Genitourinary: Positive for dysuria and hematuria. Negative for flank pain.   Musculoskeletal: Negative.    Neurological: Positive for light-headedness.   Please see history of chief complaint.  All other systems are reviewed and found unremarkable.    Physical Exam   BP: 129/81  Pulse: 97  Temp: 98.7  F (37.1  C)  Resp: 16  SpO2: 97 %      Physical Exam is a 26-year-old female who is awake alert oriented person place and time.  She is very pleasant and cooperative with my exam.  HEENT normocephalic atraumatic extraocular muscles intact pupils equally round and reactive to light.  Tongue midline palate intact oropharynx is unremarkable.  Neck is supple is full range of motion without pain.  Lungs are clear bilaterally.  Heart maintains a regular rate and rhythm S1 and S2 sounds are appreciated.  The abdomen is soft no mass no organomegaly no rebound there is some mild voluntary guarding in the suprapubic area.  There is no flank tenderness.  Extremities a full range of motion and 5/5 strength and no edema.  Neurologic exam no focal deficit dermatologic exam no diffuse skin rashes or lesions.    ED Course      Should remain very stable throughout her stay in the department.  I discussed the CT and lab findings with her.  The plan will be to discharge the patient with appropriate prescriptions and discharge instructions.  I will advise her to see her primary care provider soon as possible and also urged her to return immediately if further problems should occur.                     Results for orders placed or performed during the hospital encounter of 06/28/21 (from the past 24 hour(s))   CBC with platelets differential   Result Value Ref Range    WBC 10.8 4.0 - 11.0  10e9/L    RBC Count 4.18 3.8 - 5.2 10e12/L    Hemoglobin 12.9 11.7 - 15.7 g/dL    Hematocrit 36.9 35.0 - 47.0 %    MCV 88 78 - 100 fl    MCH 30.9 26.5 - 33.0 pg    MCHC 35.0 31.5 - 36.5 g/dL    RDW 12.1 10.0 - 15.0 %    Platelet Count 278 150 - 450 10e9/L    Diff Method Automated Method     % Neutrophils 74.4 %    % Lymphocytes 18.5 %    % Monocytes 4.5 %    % Eosinophils 2.0 %    % Basophils 0.3 %    % Immature Granulocytes 0.3 %    Nucleated RBCs 0 0 /100    Absolute Neutrophil 8.0 1.6 - 8.3 10e9/L    Absolute Lymphocytes 2.0 0.8 - 5.3 10e9/L    Absolute Monocytes 0.5 0.0 - 1.3 10e9/L    Absolute Eosinophils 0.2 0.0 - 0.7 10e9/L    Absolute Basophils 0.0 0.0 - 0.2 10e9/L    Abs Immature Granulocytes 0.0 0 - 0.4 10e9/L    Absolute Nucleated RBC 0.0    Basic metabolic panel   Result Value Ref Range    Sodium 140 133 - 144 mmol/L    Potassium 3.7 3.4 - 5.3 mmol/L    Chloride 108 94 - 109 mmol/L    Carbon Dioxide 27 20 - 32 mmol/L    Anion Gap 5 3 - 14 mmol/L    Glucose 88 70 - 99 mg/dL    Urea Nitrogen 13 7 - 30 mg/dL    Creatinine 0.72 0.52 - 1.04 mg/dL    GFR Estimate >90 >60 mL/min/[1.73_m2]    GFR Estimate If Black >90 >60 mL/min/[1.73_m2]    Calcium 8.7 8.5 - 10.1 mg/dL   HCG qualitative urine   Result Value Ref Range    HCG Qual Urine Negative NEG^Negative   UA with Microscopic   Result Value Ref Range    Color Urine Red     Appearance Urine Turbid     Glucose Urine 30 (A) NEG^Negative mg/dL    Bilirubin Urine Negative NEG^Negative    Ketones Urine 5 (A) NEG^Negative mg/dL    Specific Gravity Urine 1.034 1.003 - 1.035    Blood Urine Large (A) NEG^Negative    pH Urine 6.5 4.7 - 8.0 pH    Protein Albumin Urine 300 (A) NEG^Negative mg/dL    Urobilinogen mg/dL 2.0 0.0 - 2.0 mg/dL    Nitrite Urine Negative NEG^Negative    Leukocyte Esterase Urine Trace (A) NEG^Negative    Source Midstream Urine     WBC Urine 156 (H) 0 - 5 /HPF    RBC Urine >182 (H) 0 - 2 /HPF    Bacteria Urine None (A) NEG^Negative /HPF    Squamous  Epithelial /HPF Urine 13 (H) 0 - 1 /HPF   CT Abdomen Pelvis w/o Contrast    Narrative    PROCEDURE:  CT ABDOMEN PELVIS W/O CONTRAST    HISTORY:  Abdominal pain, acute, nonlocalized    TECHNIQUE:  Helical CT of the abdomen and pelvis was performed without  intravenous contrast.    COMPARISON:  1/13/20    FINDINGS:      Evaluation of the solid organs is somewhat limited due to the lack of  intravenous contrast.    Limited views through the lung bases show no focal consolidation or  intrapulmonary mass. The heart size is normal. No pericardial or  pleural effusions are seen.    The liver is normal in size and attenuation. The gallbladder is  contracted. The spleen, pancreas and adrenal glands are unremarkable.  A 3mm nonobstructive calculus is seen in the right kidney. There is no  evidence of hydronephrosis. There is no abdominal aortic aneurysm.      The bowel is normal in caliber. The appendix is normal in caliber as  visualized.    No free air is present. A right femoral lymph node measures 1.6 x 2.0  cm No suspicious osseous lesions are identified.      Impression    IMPRESSION:      3mm nonobstructive right renal calculus. No hydronephrosis.    Borderline enlarged right femoral/groin node.    TRICIA COTO MD       Medications   0.9% sodium chloride BOLUS (0 mLs Intravenous Stopped 6/28/21 1418)     Followed by   sodium chloride 0.9% infusion (has no administration in time range)       Assessments & Plan (with Medical Decision Making)     I have reviewed the nursing notes.    I have reviewed the findings, diagnosis, plan and need for follow up with the patient.  Plan is to discharge the patient with appropriate prescriptions and discharge instructions.    New Prescriptions    CIPROFLOXACIN (CIPRO) 250 MG TABLET    Take 1 tablet (250 mg) by mouth 2 times daily for 3 days    KETOROLAC (TORADOL) 10 MG TABLET    Take 1 tablet (10 mg) by mouth every 6 hours as needed for moderate pain       Final diagnoses:    Complicated UTI (urinary tract infection)   Hematuria, unspecified type   Kidney stone       6/28/2021   [facility]     Aakash Sheridan DO  06/28/21 1946

## 2021-06-28 NOTE — ED TRIAGE NOTES
Pt in for evaluation of hematuria and urgency. Pt reports she has a hx of trace amounts of blood in her urine but never bright red in color. Reports hx of frequent kidney and urinary tract infections but states this is different.

## 2021-06-28 NOTE — ED NOTES
AVS reviewed with pt. Educated to come back with worsening symptoms. Pt states no further questions at this time.

## 2021-07-09 ENCOUNTER — TELEPHONE (OUTPATIENT)
Dept: PSYCHIATRY | Facility: OTHER | Age: 26
End: 2021-07-09

## 2021-07-09 NOTE — LETTER
7/13/2021         To Whom It May Concern,           I work with Valorie Awad in my psychiatry clinic at [facility]. I am writing to note that her dog serves as an emotional support animal -- as they help Valorie with her mental health issues. I ask this considered in regards to Valorie's living situation. I would be happy to provide additional information and / or answer questions.        I can be reached at [phone] and our clinic fax is [phone].        Sincerely,     Jyoti Leahy MD

## 2021-07-09 NOTE — LETTER
July 13, 2021        TO: Whom It May Concern         I work with Valorie Awad in my psychiatry clinic at [facility]. Valorie struggles with symptoms of PTSD as part of her mental health issues. If you have any questions and / or concerns I can be reached at [phone] and fax is [phone].        Sincerely,    Jyoti Leahy MD

## 2021-07-09 NOTE — TELEPHONE ENCOUNTER
Patient is requesting a letter for an emotional support animal.  Patient cannot find the previous letter.  There is a letter dated for 5-7-2021 in [facility].  Patient is also requesting a letter with a diagnosis of PTSD for the medical cannabis. Patient would like to have the letters mailed to her.  Thank you, please advise.

## 2021-07-19 ENCOUNTER — NURSE TRIAGE (OUTPATIENT)
Dept: FAMILY MEDICINE | Facility: OTHER | Age: 26
End: 2021-07-19

## 2021-07-19 NOTE — TELEPHONE ENCOUNTER
Reason for Disposition    Looks infected (spreading redness, pus)  (Exception: localized redness and swelling of skin around nail)    Additional Information    Negative: [1] Major bleeding (e.g., spurting blood) AND [2] can't be stopped    Negative: Wound looks infected    Negative: Caused by animal bite    Negative: Caused by human bite    Negative: Amputated finger    Negative: Skin is split open or gaping (or length > 1/2 inch or 12 mm)    Negative: [1] Bleeding AND [2] won't stop after 10 minutes of direct pressure (using correct technique)    Negative: [1] Dirt in the wound AND [2] not removed with 15 minutes of scrubbing    Negative: High pressure injection injury (e.g., from grease gun or paint gun, usually work-related)    Negative: Looks like a broken bone (e.g., crooked or deformed)    Negative: Looks like a dislocated joint (e.g., crooked or deformed)    Negative: [1] Fingernail is partially torn AND [2] from crush injury  (Exception: torn nail from catching it on something)    Negative: [1] Cut AND [2] numbness (loss of sensation) of finger    Negative: [1] Cut AND [2] finger joint can't be opened (straightened) or closed (bent) completely    Negative: Sounds like a serious injury to the triager    Negative: [1] SEVERE pain AND [2] not improved 2 hours after pain medicine/ice packs    Negative: [1] MODERATE-SEVERE pain AND [2] blood present under a nail    Negative: Fingernail is completely torn off (fingernail avulsion)    Negative: Base of fingernail has popped out of the skin fold (cuticle)    Negative: Suspicious history for the injury    Negative: Large swelling or bruise    Negative: Finger joint can't be opened (straightened) or closed (bent) completely    (Note: injured person should be able to do this without assistance)    Negative: [1] No prior tetanus shots (or is not fully vaccinated) AND [2] any wound (e.g., cut, scrape)    Negative: [1] HIV positive or severe immunodeficiency (severely  "weak immune system) AND [2] DIRTY cut or scrape    Negative: [1] Last tetanus shot > 5 years ago AND [2] DIRTY cut or scrape    Negative: [1] Last tetanus shot > 10 years ago AND [2] CLEAN cut or scrape (e.g., object AND skin were clean)    Negative: [1] After 3 days AND [2] pain not improved    Negative: [1] After 1 week AND [2] not using the finger normally    Negative: [1] Fingernail comes off or is almost off AND [2] follows old injury AND [3] wants doctor to remove it    Negative: [1] Looks infected (e.g., spreading redness, red streak, pus) AND [2] fever    Negative: [1] Looks infected (e.g., spreading redness, red streak, pus) AND [2] severe pain with movement    Negative: Patient sounds very sick or weak to the triager    Negative: Followed a finger injury    Negative: Wound looks infected    Negative: Caused by an animal bite    Negative: Caused by frostbite    Negative: Caused by a human bite    Negative: Hand or wrist pain is main symptom    Negative: [1] Swollen joint AND [2] fever    Negative: [1] Looks infected (e.g., spreading redness, red streak, pus) AND [2] large red area (more than 2 in or 5 cm, or entire finger)    Negative: [1] SEVERE pain (e.g., excruciating) AND [2] not improved after 2 hours of pain medicine    Negative: Yellow pus under skin around fingernail (cuticle) or pus under fingernail    Answer Assessment - Initial Assessment Questions  1. MECHANISM: \"How did the injury happen?\"       Not sure  2. ONSET: \"When did the injury happen?\" (Minutes or hours ago)       Not sure, first noticed symptoms 7/17  3. LOCATION: \"What part of the finger is injured?\" \"Is the nail damaged?\"       R pointer finger, base of finger  4. APPEARANCE of the INJURY: \"What does the injury look like?\"       Swollen, redness, hard, hot  5. SEVERITY: \"Can you use the hand normally?\"  \"Can you bend your fingers into a ball and then fully open them?\"      Can bend but very painful  6. SIZE: For cuts, bruises, or " "swelling, ask: \"How large is it?\" (e.g., inches or centimeters;  entire finger)       x2 size of other finger  7. PAIN: \"Is there pain?\" If so, ask: \"How bad is the pain?\"    (e.g., Scale 1-10; or mild, moderate, severe)      6/10  8. TETANUS: For any breaks in the skin, ask: \"When was the last tetanus booster?\"      no  9. OTHER SYMPTOMS: \"Do you have any other symptoms?\"      no  10. PREGNANCY: \"Is there any chance you are pregnant?\" \"When was your last menstrual period?\"        no    Protocols used: FINGER PAIN-A-AH, FINGER INJURY-A-AH      "

## 2021-07-20 ENCOUNTER — ANCILLARY PROCEDURE (OUTPATIENT)
Dept: GENERAL RADIOLOGY | Facility: OTHER | Age: 26
End: 2021-07-20
Attending: FAMILY MEDICINE
Payer: COMMERCIAL

## 2021-07-20 ENCOUNTER — OFFICE VISIT (OUTPATIENT)
Dept: FAMILY MEDICINE | Facility: OTHER | Age: 26
End: 2021-07-20
Attending: FAMILY MEDICINE
Payer: COMMERCIAL

## 2021-07-20 VITALS
WEIGHT: 137 LBS | OXYGEN SATURATION: 98 % | HEART RATE: 80 BPM | DIASTOLIC BLOOD PRESSURE: 62 MMHG | BODY MASS INDEX: 21.5 KG/M2 | TEMPERATURE: 96.8 F | HEIGHT: 67 IN | SYSTOLIC BLOOD PRESSURE: 108 MMHG

## 2021-07-20 DIAGNOSIS — L08.9 INFECTION OF INDEX FINGER: ICD-10-CM

## 2021-07-20 DIAGNOSIS — G43.719 INTRACTABLE CHRONIC MIGRAINE WITHOUT AURA AND WITHOUT STATUS MIGRAINOSUS: ICD-10-CM

## 2021-07-20 DIAGNOSIS — L08.9 INFECTION OF INDEX FINGER: Primary | ICD-10-CM

## 2021-07-20 PROCEDURE — 99213 OFFICE O/P EST LOW 20 MIN: CPT | Performed by: FAMILY MEDICINE

## 2021-07-20 PROCEDURE — 73130 X-RAY EXAM OF HAND: CPT | Mod: TC,RT

## 2021-07-20 PROCEDURE — G0463 HOSPITAL OUTPT CLINIC VISIT: HCPCS

## 2021-07-20 RX ORDER — DILTIAZEM HYDROCHLORIDE 120 MG/1
120 CAPSULE, EXTENDED RELEASE ORAL DAILY
Qty: 30 CAPSULE | Refills: 3 | Status: SHIPPED | OUTPATIENT
Start: 2021-07-20 | End: 2021-11-23

## 2021-07-20 RX ORDER — ONDANSETRON 8 MG/1
8 TABLET, FILM COATED ORAL EVERY 8 HOURS PRN
Qty: 20 TABLET | Refills: 3 | Status: SHIPPED | OUTPATIENT
Start: 2021-07-20 | End: 2023-08-02

## 2021-07-20 RX ORDER — SUMATRIPTAN 100 MG/1
100 TABLET, FILM COATED ORAL
Qty: 9 TABLET | Refills: 3 | Status: SHIPPED | OUTPATIENT
Start: 2021-07-20 | End: 2022-04-05

## 2021-07-20 ASSESSMENT — PAIN SCALES - GENERAL: PAINLEVEL: EXTREME PAIN (8)

## 2021-07-20 ASSESSMENT — MIFFLIN-ST. JEOR: SCORE: 1389.06

## 2021-07-20 NOTE — NURSING NOTE
"Chief Complaint   Patient presents with     Musculoskeletal Problem       Initial /62 (BP Location: Left arm, Patient Position: Sitting, Cuff Size: Adult Regular)   Pulse 80   Temp 96.8  F (36  C) (Tympanic)   Ht 1.702 m (5' 7\")   Wt 62.1 kg (137 lb)   SpO2 98%   BMI 21.46 kg/m   Estimated body mass index is 21.46 kg/m  as calculated from the following:    Height as of this encounter: 1.702 m (5' 7\").    Weight as of this encounter: 62.1 kg (137 lb).  Medication Reconciliation: complete  Erin Perkins LPN  "

## 2021-07-20 NOTE — PROGRESS NOTES
Assessment & Plan     Infection of index finger  Start Augmentin bid today, discussed possible low risk cross reaction with allergy to Lorabid, but for this infection of the hand Augmentin would be the treatment of choice  Refer to OA to be evaluated tomorrow  relafen for pain  - XR Hand Right G/E 3 Views (Clinic Performed); Future  - Orthopedic  Referral; Future  - amoxicillin-clavulanate (AUGMENTIN) 875-125 MG tablet; Take 1 tablet by mouth 2 times daily for 10 days  - nabumetone (RELAFEN) 750 MG tablet; Take 1 tablet (750 mg) by mouth 2 times daily    Intractable chronic migraine without aura and without status migrainosus  Renewed  Start diltiazem for migraine prevention, discussed watching for low BP  - SUMAtriptan (IMITREX) 100 MG tablet; Take 1 tablet (100 mg) by mouth at onset of headache for migraine May repeat in 2 hours if needed: max 2/day; average number of headaches monthly  - diltiazem ER (DILT-XR) 120 MG 24 hr capsule; Take 1 capsule (120 mg) by mouth daily  - ondansetron (ZOFRAN) 8 MG tablet; Take 1 tablet (8 mg) by mouth every 8 hours as needed for nausea      20 minutes spent on the date of the encounter doing chart review, interpretation of tests, patient visit, documentation and discussion with family            No follow-ups on file.    Bisi Muro MD  [facility] - ROGER Eguene is a 26 year old who presents for the following health issues  accompanied by her grandmother:    HPI     Musculoskeletal problem/pain  /Onset/Duration: 7/17/21  Description  Location: pointer finger - right  Joint Swelling: YES  Redness: YES  Pain: YES  Warmth: YES  Intensity:  moderate  Progression of Symptoms:  worsening and constant  Accompanying signs and symptoms:   Fevers: no  Numbness/tingling/weakness: weakness   History  Trauma to the area: Pt unsure of the cause  Recent illness:  YES- recent kidney infection  Previous similar problem: no  Previous evaluation:   "no  Precipitating or alleviating factors:  Aggravating factors include: overuse  Therapies tried and outcome: rest/inactivity, ice, acetaminophen and Ibuprofen-ineffective    This started on July 17. No known trauma. Pain and swelling around the base of the right index finger and MCP joint. Painful to move. No drainage. No fever    Migraine headaches  She was taken off of Topamax by Dr Leahy for her anxiety but his was also being used to prevent migraine headaches. Discussed options. imitrex needs to be renewed as well.         Review of Systems   Constitutional, HEENT, cardiovascular, pulmonary, gi and gu systems are negative, except as otherwise noted.      Objective    /62 (BP Location: Left arm, Patient Position: Sitting, Cuff Size: Adult Regular)   Pulse 80   Temp 96.8  F (36  C) (Tympanic)   Ht 1.702 m (5' 7\")   Wt 62.1 kg (137 lb)   SpO2 98%   BMI 21.46 kg/m    Body mass index is 21.46 kg/m .  Physical Exam   GENERAL APPEARANCE: healthy, alert and mild distress  MS: erythema and induration of the right index finger at the base and MCP joint with decreased flexion with pain, full extension. Palm not involved. Tender to palpation, no drainage   SKIN: erythema of the above area  NEURO: Normal strength and tone, mentation intact and speech normal  PSYCH: mentation appears normal and worried    Xray - Reviewed and interpreted by me.  Soft tissue swelling around the base of the index finger without foreign body noted             "

## 2021-07-21 ENCOUNTER — HOSPITAL ENCOUNTER (EMERGENCY)
Facility: HOSPITAL | Age: 26
Discharge: HOME | End: 2021-07-21
Attending: NURSE PRACTITIONER | Admitting: NURSE PRACTITIONER
Payer: COMMERCIAL

## 2021-07-21 VITALS
RESPIRATION RATE: 16 BRPM | TEMPERATURE: 98.1 F | HEART RATE: 98 BPM | DIASTOLIC BLOOD PRESSURE: 82 MMHG | OXYGEN SATURATION: 96 % | SYSTOLIC BLOOD PRESSURE: 126 MMHG

## 2021-07-21 DIAGNOSIS — L03.119 CELLULITIS AND ABSCESS OF HAND: Primary | ICD-10-CM

## 2021-07-21 DIAGNOSIS — L02.519 CELLULITIS AND ABSCESS OF HAND: Primary | ICD-10-CM

## 2021-07-21 LAB
ANION GAP SERPL CALCULATED.3IONS-SCNC: 7 MMOL/L (ref 3–14)
BASOPHILS # BLD AUTO: 0 10E3/UL (ref 0–0.2)
BASOPHILS NFR BLD AUTO: 0 %
BUN SERPL-MCNC: 15 MG/DL (ref 7–30)
CALCIUM SERPL-MCNC: 8.5 MG/DL (ref 8.5–10.1)
CHLORIDE BLD-SCNC: 107 MMOL/L (ref 94–109)
CO2 SERPL-SCNC: 25 MMOL/L (ref 20–32)
CREAT SERPL-MCNC: 0.67 MG/DL (ref 0.52–1.04)
EOSINOPHIL # BLD AUTO: 0.2 10E3/UL (ref 0–0.7)
EOSINOPHIL NFR BLD AUTO: 2 %
ERYTHROCYTE [DISTWIDTH] IN BLOOD BY AUTOMATED COUNT: 11.9 % (ref 10–15)
GFR SERPL CREATININE-BSD FRML MDRD: >90 ML/MIN/1.73M2
GLUCOSE BLD-MCNC: 104 MG/DL (ref 70–99)
HCT VFR BLD AUTO: 36.6 % (ref 35–47)
HGB BLD-MCNC: 13 G/DL (ref 11.7–15.7)
HOLD SPECIMEN: NORMAL
IMM GRANULOCYTES # BLD: 0 10E3/UL
IMM GRANULOCYTES NFR BLD: 0 %
LACTATE SERPL-SCNC: 1.3 MMOL/L (ref 0.7–2)
LYMPHOCYTES # BLD AUTO: 1.9 10E3/UL (ref 0.8–5.3)
LYMPHOCYTES NFR BLD AUTO: 21 %
MCH RBC QN AUTO: 31.1 PG (ref 26.5–33)
MCHC RBC AUTO-ENTMCNC: 35.5 G/DL (ref 31.5–36.5)
MCV RBC AUTO: 88 FL (ref 78–100)
MONOCYTES # BLD AUTO: 0.5 10E3/UL (ref 0–1.3)
MONOCYTES NFR BLD AUTO: 5 %
NEUTROPHILS # BLD AUTO: 6.5 10E3/UL (ref 1.6–8.3)
NEUTROPHILS NFR BLD AUTO: 72 %
NRBC # BLD AUTO: 0 10E3/UL
NRBC BLD AUTO-RTO: 0 /100
PLATELET # BLD AUTO: 267 10E3/UL (ref 150–450)
POTASSIUM BLD-SCNC: 3.6 MMOL/L (ref 3.4–5.3)
RBC # BLD AUTO: 4.18 10E6/UL (ref 3.8–5.2)
SODIUM SERPL-SCNC: 139 MMOL/L (ref 133–144)
WBC # BLD AUTO: 9.1 10E3/UL (ref 4–11)

## 2021-07-21 PROCEDURE — 83605 ASSAY OF LACTIC ACID: CPT | Performed by: EMERGENCY MEDICINE

## 2021-07-21 PROCEDURE — 10060 I&D ABSCESS SIMPLE/SINGLE: CPT | Performed by: NURSE PRACTITIONER

## 2021-07-21 PROCEDURE — 87205 SMEAR GRAM STAIN: CPT | Performed by: NURSE PRACTITIONER

## 2021-07-21 PROCEDURE — 36415 COLL VENOUS BLD VENIPUNCTURE: CPT | Performed by: EMERGENCY MEDICINE

## 2021-07-21 PROCEDURE — 87186 SC STD MICRODIL/AGAR DIL: CPT | Performed by: NURSE PRACTITIONER

## 2021-07-21 PROCEDURE — 99283 EMERGENCY DEPT VISIT LOW MDM: CPT | Mod: 25 | Performed by: NURSE PRACTITIONER

## 2021-07-21 PROCEDURE — 85004 AUTOMATED DIFF WBC COUNT: CPT | Performed by: EMERGENCY MEDICINE

## 2021-07-21 PROCEDURE — 80048 BASIC METABOLIC PNL TOTAL CA: CPT | Performed by: NURSE PRACTITIONER

## 2021-07-21 PROCEDURE — 99283 EMERGENCY DEPT VISIT LOW MDM: CPT | Mod: 25

## 2021-07-21 PROCEDURE — 10060 I&D ABSCESS SIMPLE/SINGLE: CPT

## 2021-07-21 PROCEDURE — 85025 COMPLETE CBC W/AUTO DIFF WBC: CPT | Performed by: NURSE PRACTITIONER

## 2021-07-21 PROCEDURE — 83605 ASSAY OF LACTIC ACID: CPT | Performed by: NURSE PRACTITIONER

## 2021-07-21 RX ORDER — DOXYCYCLINE HYCLATE 100 MG
100 TABLET ORAL 2 TIMES DAILY
Qty: 20 TABLET | Refills: 0 | Status: SHIPPED | OUTPATIENT
Start: 2021-07-21 | End: 2023-06-19

## 2021-07-21 RX ORDER — MUPIROCIN 20 MG/G
OINTMENT TOPICAL 3 TIMES DAILY
Qty: 15 G | Refills: 0 | Status: SHIPPED | OUTPATIENT
Start: 2021-07-21 | End: 2023-06-19

## 2021-07-21 ASSESSMENT — ENCOUNTER SYMPTOMS
COLOR CHANGE: 1
FEVER: 0
ARTHRALGIAS: 1
JOINT SWELLING: 1
CHILLS: 0
WOUND: 0

## 2021-07-21 NOTE — ED NOTES
Pt stated she has had a right pointer finger infection since Saturday 7/17. Pt stated that she is rating pain 10/10. Finger is edematous, red, purple, swollen, and painful to touch. Pt states there was a small cut 2 weeks ago.

## 2021-07-21 NOTE — DISCHARGE INSTRUCTIONS
(L03.119,  L02.519) Cellulitis and abscess of hand  (primary encounter diagnosis)  Comment: acute, symptomatic  Plan:   Saw PCP yesterday - normal XR.   Saw Dr. Flanagan, orthopedics, today who said joint was not affected and sent her here.  After informed consent, digital block, I&D with wound culture obtained.   Clindamycin listed as allergy but given abscess with cellulitis - will continue with Augmentin twice daily and add in doxycycline 100 mg BID x 10 days to cover for possible staph/MRSA.   Recommend:  - Keep area of incision clean and dry  - CONTINUE amoxicillin-clavulanate (Augmentin) 1 tablet twice daily (Breakfast/Dinner) x 10 days as directed by PCP  - START doxycycline 100 mg twice daily (breakfast/dinner) with Augmentin  - Manage pain with acetaminophen (Tylenol) 500 mg every 8 hours and ibuprofen (Advil) 800 mg with food every 8 hours. *You can alternate these every 4 hours. For example: 8 am ibuprofen, 12 pm acetaminophen, 4 pm ibuprofen, 8 pm acetaminophen, etc.*  - You can use narcotic pain medication (tylenol with codeine) as directed for pain not alleviated by above  Narcotics are intended for short term use of acute pain.  They are not typically indicated for long term use or treatment of chronic pain due to risks and side effects.  Avoid operating vehicle/heavy equipment while taking narcotics. Also avoid drinking alcohol and using elicit substances while taking narcotics as doing so may increase your risk of side effects including respiratory depression which can lead to death.  Common side effects of narcotics include but are not limited to: constipation, nausea, vomiting, dizziness, sedation.           RETURN TO THE ED WITH NEW OR WORSENING SYMPTOMS.    ED FOLLOW-UP WITH YOUR PRIMARY CARE PROVIDER IN 2-3 DAYS TO ENSURE IMPROVEMENT      Valencia Patino CNP    Results for orders placed or performed during the hospital encounter of 07/21/21   Extra Purple Top Tube     Status: None   Result  Value Ref Range    Hold Specimen [facility]    Basic metabolic panel     Status: Abnormal   Result Value Ref Range    Sodium 139 133 - 144 mmol/L    Potassium 3.6 3.4 - 5.3 mmol/L    Chloride 107 94 - 109 mmol/L    Carbon Dioxide (CO2) 25 20 - 32 mmol/L    Anion Gap 7 3 - 14 mmol/L    Urea Nitrogen 15 7 - 30 mg/dL    Creatinine 0.67 0.52 - 1.04 mg/dL    Calcium 8.5 8.5 - 10.1 mg/dL    Glucose 104 (H) 70 - 99 mg/dL    GFR Estimate >90 >60 mL/min/1.73m2   Lactic acid whole blood     Status: Normal   Result Value Ref Range    Lactic Acid 1.3 0.7 - 2.0 mmol/L   CBC with platelets and differential     Status: None   Result Value Ref Range    WBC Count 9.1 4.0 - 11.0 10e3/uL    RBC Count 4.18 3.80 - 5.20 10e6/uL    Hemoglobin 13.0 11.7 - 15.7 g/dL    Hematocrit 36.6 35.0 - 47.0 %    MCV 88 78 - 100 fL    MCH 31.1 26.5 - 33.0 pg    MCHC 35.5 31.5 - 36.5 g/dL    RDW 11.9 10.0 - 15.0 %    Platelet Count 267 150 - 450 10e3/uL    % Neutrophils 72 %    % Lymphocytes 21 %    % Monocytes 5 %    % Eosinophils 2 %    % Basophils 0 %    % Immature Granulocytes 0 %    NRBCs per 100 WBC 0 <1 /100    Absolute Neutrophils 6.5 1.6 - 8.3 10e3/uL    Absolute Lymphocytes 1.9 0.8 - 5.3 10e3/uL    Absolute Monocytes 0.5 0.0 - 1.3 10e3/uL    Absolute Eosinophils 0.2 0.0 - 0.7 10e3/uL    Absolute Basophils 0.0 0.0 - 0.2 10e3/uL    Absolute Immature Granulocytes 0.0 <=0.0 10e3/uL    Absolute NRBCs 0.0 10e3/uL   [place] Draw     Status: None (In process)    Narrative    The following orders were created for panel order [place] Draw.  Procedure                               Abnormality         Status                     ---------                               -----------         ------                     Extra Blood Culture Bottle[[id number]]                                                  Extra Red Top Tube[[id number]]                                                          Extra Green Top (Lithium...[[id number]]                                                  Extra Purple Top Tube[[id number]]                            Final result                 Please view results for these tests on the individual orders.   CBC with platelets differential     Status: None    Narrative    The following orders were created for panel order CBC with platelets differential.  Procedure                               Abnormality         Status                     ---------                               -----------         ------                     CBC with platelets and d...[[id number]]                      Final result                 Please view results for these tests on the individual orders.

## 2021-07-21 NOTE — ED PROVIDER NOTES
History     Chief Complaint   Patient presents with     Hand Pain     rt index finger pain and swelling, notes started on antibiotics for infection and getting worse     HPI  Valorie Awad is a 26 year old female who presents ambulatory for evaluation of swelling, redness, pain of right index finger. She was evaluated by PCP yesterday - normal XR. She was evaluated by orthopedics today who sent her here - she states that they told her there was no concern about joint involvement and that she needed abscess drained. She did start Augmentin given by PCP yesterday. Denies any side effects.     She denies any injury or pain to finger. She has tried acetaminophen and ibuprofen without relief of discomfort. Currently 8-9/10. Pain increases with movement     Allergies:  Allergies   Allergen Reactions     Bactrim [Sulfamethoxazole W-Trimethoprim] Rash     Morphine Hcl Nausea and Vomiting     Clindamycin Other (See Comments)     Thrush       Nitrofurantoin      Macrobid      Nitrofurantoin Monohydrate Macrocrystals      Macrobid      Loracarbef Rash     Lorabid       Problem List:    Patient Active Problem List    Diagnosis Date Noted     Chronic, continuous use of opioids 09/23/2019     Priority: Medium     Panic disorder without agoraphobia 10/18/2018     Priority: Medium     Patient is followed by  for ongoing prescription of benzodiazepines.  All refills should be approved by this provider, or covering partner.    Medication(s): Valium 10 mg.   Maximum quantity per month: 30  Clinic visit frequency required: Q 3 months     Controlled substance agreement on file: Yes       Date(s): 9.18.19  Benzodiazepine use reviewed by psychiatry:  Yes       Date(s): 9.18.19    Last [place] website verification:  done on 9.18.19  [email or web address]           Recurrent major depressive disorder, in full remission (H) 10/18/2018     Priority: Medium     ACP (advance care planning) 03/22/2017     Priority:  Medium     Advance Care Planning 7/6/2017: ACP Review of Chart / Resources Provided:  Reviewed chart for advance care plan.  Valorie Awad has no plan or code status on file. Discussed available resources and provided with information.   Added by KARLY CORBETT                 Spasm of muscle 08/12/2015     Priority: Medium     Migraine 10/10/2007     Priority: Medium     Problem list name updated by automated process. Provider to review       Anxiety state 09/20/2001     Priority: Medium     Problem list name updated by automated process. Provider to review          Past Medical History:    Past Medical History:   Diagnosis Date     Abnormal Pap smear      Anxiety 9/20/2001     Marijuana use      Migraine headaches 10/10/2007     Supervision of other normal pregnancy        Past Surgical History:    Past Surgical History:   Procedure Laterality Date     Comminuted fracture elbow Left 10/25/2013    lauren placed     CYSTOSCOPY      bladder distention     TONSILLECTOMY         Family History:    Family History   Problem Relation Age of Onset     Other - See Comments Father         alcoholism     Depression Father      Mental Illness Father      Depression Mother      Irritable Bowel Syndrome Mother      Mental Illness Mother      Other - See Comments Mother         migraine headache     Other - See Comments Maternal Grandmother         blood disease     Lipids Maternal Grandmother         hyperlipidemia     Thyroid Disease Maternal Grandmother      Diabetes No family hx of      Asthma No family hx of        Social History:  Marital Status:  Single [1]  Social History     Tobacco Use     Smoking status: Never Smoker     Smokeless tobacco: Never Used   Substance Use Topics     Alcohol use: Yes     Alcohol/week: 0.0 standard drinks     Comment: social     Drug use: No        Medications:    acetaminophen-codeine (TYLENOL #3) 300-30 MG tablet  amoxicillin-clavulanate (AUGMENTIN) 875-125 MG tablet  diltiazem ER (DILT-XR)  120 MG 24 hr capsule  doxycycline hyclate (VIBRA-TABS) 100 MG tablet  FLUoxetine (PROZAC) 10 MG capsule  ISIBLOOM 0.15-30 MG-MCG tablet  mupirocin (BACTROBAN) 2 % external ointment  valACYclovir (VALTREX) 500 MG tablet  diazepam (VALIUM) 10 MG tablet  hydrOXYzine (VISTARIL) 25 MG capsule  nabumetone (RELAFEN) 750 MG tablet  ondansetron (ZOFRAN) 8 MG tablet  SUMAtriptan (IMITREX) 100 MG tablet          Review of Systems   Constitutional: Negative for chills and fever.   Musculoskeletal: Positive for arthralgias (right index fever) and joint swelling (right index finger).   Skin: Positive for color change (erythema of right index finger). Negative for rash and wound.       Physical Exam   BP: 126/82  Pulse: 98  Temp: 97.1  F (36.2  C)  Resp: 16  SpO2: 96 %      Physical Exam  Constitutional:       General: She is in acute distress (due to pain of right index finger).      Appearance: She is not ill-appearing or toxic-appearing.   Cardiovascular:      Rate and Rhythm: Normal rate and regular rhythm.      Pulses:           Radial pulses are 2+ on the right side.   Pulmonary:      Effort: Pulmonary effort is normal.   Musculoskeletal:      Right hand: Swelling and tenderness (right index finger) present. Normal capillary refill. Normal pulse.        Hands:    Skin:     General: Skin is warm and dry.      Capillary Refill: Capillary refill takes less than 2 seconds.   Neurological:      Mental Status: She is alert and oriented to person, place, and time.      Gait: Gait is intact.   Psychiatric:         Mood and Affect: Mood normal.         Speech: Speech normal.         Behavior: Behavior is cooperative.              Erythema, edema, tenderness of right index finger    Erythema, edema, tenderness of right index finger. Small callus at base of finger - no drainage. She denies any injuries or potential foreign body.      ED Course        [facility]    PROCEDURE: -Incision/Drainage  Performed by: Sukumar  PING [place]  Authorized by: Valencia Patino CNP       LOCATION:      Type:  Abscess    Location:  Upper extremity    Upper extremity location:  Finger    Finger location:  R index finger    PRE-PROCEDURE DETAILS:     Skin preparation:  Chloraprep    ANESTHESIA (see MAR for exact dosages):     Anesthesia method:  Local infiltration    Local anesthetic:  Lidocaine 2% w/o epi    PROCEDURE TYPE:     Complexity:  Simple    PROCEDURE DETAILS:     Needle aspiration: no      Incision types:  Single straight    Incision depth:  Dermal    Scalpel blade:  11    Wound management:  Probed and deloculated    Drainage:  Purulent and bloody    Drainage amount:  Moderate    Wound treatment:  Wound left open    Packing materials:  None  Post-procedure details:     PROCEDURE   Patient Tolerance:  Patient tolerated the procedure well with no immediate complications                 Results for orders placed or performed during the hospital encounter of 07/21/21 (from the past 24 hour(s))   [place] Draw *Canceled*    Narrative    The following orders were created for panel order [place] Draw.  Procedure                               Abnormality         Status                     ---------                               -----------         ------                       Please view results for these tests on the individual orders.   [place] Draw    Narrative    The following orders were created for panel order [place] Draw.  Procedure                               Abnormality         Status                     ---------                               -----------         ------                     Extra Blood Culture Bottle[[id number]]                                                  Extra Red Top Tube[[id number]]                                                          Extra Green Top (Lithium...[[id number]]                                                 Extra Purple Top Tube[[id number]]                            Final result                  Please view results for these tests on the individual orders.   Extra Purple Top Tube   Result Value Ref Range    Hold Specimen [facility]    CBC with platelets differential    Narrative    The following orders were created for panel order CBC with platelets differential.  Procedure                               Abnormality         Status                     ---------                               -----------         ------                     CBC with platelets and d...[[id number]]                      Final result                 Please view results for these tests on the individual orders.   Basic metabolic panel   Result Value Ref Range    Sodium 139 133 - 144 mmol/L    Potassium 3.6 3.4 - 5.3 mmol/L    Chloride 107 94 - 109 mmol/L    Carbon Dioxide (CO2) 25 20 - 32 mmol/L    Anion Gap 7 3 - 14 mmol/L    Urea Nitrogen 15 7 - 30 mg/dL    Creatinine 0.67 0.52 - 1.04 mg/dL    Calcium 8.5 8.5 - 10.1 mg/dL    Glucose 104 (H) 70 - 99 mg/dL    GFR Estimate >90 >60 mL/min/1.73m2   Lactic acid whole blood   Result Value Ref Range    Lactic Acid 1.3 0.7 - 2.0 mmol/L   CBC with platelets and differential   Result Value Ref Range    WBC Count 9.1 4.0 - 11.0 10e3/uL    RBC Count 4.18 3.80 - 5.20 10e6/uL    Hemoglobin 13.0 11.7 - 15.7 g/dL    Hematocrit 36.6 35.0 - 47.0 %    MCV 88 78 - 100 fL    MCH 31.1 26.5 - 33.0 pg    MCHC 35.5 31.5 - 36.5 g/dL    RDW 11.9 10.0 - 15.0 %    Platelet Count 267 150 - 450 10e3/uL    % Neutrophils 72 %    % Lymphocytes 21 %    % Monocytes 5 %    % Eosinophils 2 %    % Basophils 0 %    % Immature Granulocytes 0 %    NRBCs per 100 WBC 0 <1 /100    Absolute Neutrophils 6.5 1.6 - 8.3 10e3/uL    Absolute Lymphocytes 1.9 0.8 - 5.3 10e3/uL    Absolute Monocytes 0.5 0.0 - 1.3 10e3/uL    Absolute Eosinophils 0.2 0.0 - 0.7 10e3/uL    Absolute Basophils 0.0 0.0 - 0.2 10e3/uL    Absolute Immature Granulocytes 0.0 <=0.0 10e3/uL    Absolute NRBCs 0.0 10e3/uL       Medications - No data to  display    Assessments & Plan (with Medical Decision Making)     I have reviewed the nursing notes.    I have reviewed the findings, diagnosis, plan and need for follow up with the patient.  (L03.119,  L02.519) Cellulitis and abscess of hand  (primary encounter diagnosis)  Comment: acute, symptomatic  Plan:   Saw PCP yesterday - normal XR.   Saw Dr. Flanagan, orthopedics, today who said joint was not affected and sent her here.  After informed consent, digital block, I&D with wound culture obtained. No crepitus of soft tissue. XR negative for air or bony destruction yesterday.   Clindamycin listed as allergy but given abscess with cellulitis - will continue with Augmentin twice daily and add in doxycycline 100 mg BID x 10 days to cover for possible staph/MRSA.   Recommend:  - Keep area of incision clean and dry  - CONTINUE amoxicillin-clavulanate (Augmentin) 1 tablet twice daily (Breakfast/Dinner) x 10 days as directed by PCP  - START doxycycline 100 mg twice daily (breakfast/dinner) with Augmentin  - Manage pain with acetaminophen (Tylenol) 500 mg every 8 hours and ibuprofen (Advil) 800 mg with food every 8 hours. *You can alternate these every 4 hours. For example: 8 am ibuprofen, 12 pm acetaminophen, 4 pm ibuprofen, 8 pm acetaminophen, etc.*  - You can use narcotic pain medication (tylenol with codeine) as directed for pain not alleviated by above      Valencia Patino CNP      Discharge Medication List as of 7/21/2021  4:33 PM      START taking these medications    Details   acetaminophen-codeine (TYLENOL #3) 300-30 MG tablet Take 1-2 tablets by mouth every 6 hours as needed for severe pain, Disp-8 tablet, R-0, E-Prescribe      doxycycline hyclate (VIBRA-TABS) 100 MG tablet Take 1 tablet (100 mg) by mouth 2 times daily for 10 days, Disp-20 tablet, R-0, E-Prescribe      mupirocin (BACTROBAN) 2 % external ointment Apply topically 3 times daily for 7 daysDisp-15 g, [id number]             Final diagnoses:    Cellulitis and abscess of hand       7/21/2021   [facility]     Valencia Patino, CNP  07/21/21 [id number]

## 2021-07-22 LAB
HOLD SPECIMEN: NORMAL

## 2021-07-24 LAB
BACTERIA ABSC ANAEROBE+AEROBE CULT: ABNORMAL
BACTERIA ABSC ANAEROBE+AEROBE CULT: ABNORMAL
GRAM STAIN RESULT: ABNORMAL
GRAM STAIN RESULT: ABNORMAL

## 2021-07-27 ENCOUNTER — TELEPHONE (OUTPATIENT)
Dept: PSYCHIATRY | Facility: OTHER | Age: 26
End: 2021-07-27

## 2021-07-27 NOTE — TELEPHONE ENCOUNTER
Return call from patient.  Told patient that form will be completed and faxed.  She is ok with this plan.

## 2021-07-27 NOTE — TELEPHONE ENCOUNTER
Tried to reach patient by phone about a request for a medical opinion form..  Unable to leave a msg d/t VM not set up yet.  Will complete form and fax asap.

## 2021-08-16 DIAGNOSIS — F41.0 PANIC DISORDER WITHOUT AGORAPHOBIA: ICD-10-CM

## 2021-08-17 RX ORDER — DIAZEPAM 10 MG
TABLET ORAL
Qty: 30 TABLET | Refills: 1 | Status: SHIPPED | OUTPATIENT
Start: 2021-08-26 | End: 2021-12-01

## 2021-08-17 NOTE — TELEPHONE ENCOUNTER
Valium 10 mg - Post dated for 8.26  Last visit: 6.14.21  Last refill: 7.27.21 #30 per [place]     Future appointments:

## 2021-09-20 ENCOUNTER — NURSE TRIAGE (OUTPATIENT)
Dept: FAMILY MEDICINE | Facility: OTHER | Age: 26
End: 2021-09-20

## 2021-09-20 DIAGNOSIS — Z20.822 SUSPECTED 2019 NOVEL CORONAVIRUS INFECTION: Primary | ICD-10-CM

## 2021-09-20 NOTE — TELEPHONE ENCOUNTER
Reason for Disposition    [1] COVID-19 infection suspected by caller or triager AND [2] mild symptoms (cough, fever, or others) AND [3] no complications or SOB    Additional Information    Negative: SEVERE difficulty breathing (e.g., struggling for each breath, speaks in single words)    Negative: Difficult to awaken or acting confused (e.g., disoriented, slurred speech)    Negative: Bluish (or gray) lips or face now    Negative: Shock suspected (e.g., cold/pale/clammy skin, too weak to stand, low BP, rapid pulse)    Negative: Sounds like a life-threatening emergency to the triager    Negative: [1] COVID-19 exposure AND [2] has not completed COVID-19 vaccine series AND [3] no symptoms    Negative: [1] COVID-19 exposure AND [2] completed COVID-19 vaccine series (fully vaccinated) AND [3] no symptoms    Negative: COVID-19 vaccine reaction suspected (e.g., fever, headache, muscle aches) occurring during days 1-3 after getting vaccine    Negative: COVID-19 vaccine, questions about    Negative: [1] COVID-19 vaccine series completed (fully vaccinated) in past 3 months AND [2] new-onset of COVID-19 symptoms BUT [3] no known exposure    Negative: [1] Had lab test confirmed COVID-19 infection within last 3 monthsAND [2] new-onset of COVID-19 symptoms BUT [3] no known exposure    Negative: [1] Lives with someone known to have influenza (flu test positive) AND [2] flu-like symptoms (e.g., cough, runny nose, sore throat, SOB; with or without fever)    Negative: [1] Adult with possible COVID-19 symptoms AND [2] triager concerned about severity of symptoms or other causes    Negative: COVID-19 and breastfeeding, questions about    Negative: SEVERE or constant chest pain or pressure (Exception: mild central chest pain, present only when coughing)    Negative: MODERATE difficulty breathing (e.g., speaks in phrases, SOB even at rest, pulse 100-120)    Negative: [1] Headache AND [2] stiff neck (can't touch chin to chest)     "Negative: MILD difficulty breathing (e.g., minimal/no SOB at rest, SOB with walking, pulse <100)    Negative: Chest pain or pressure    Negative: Patient sounds very sick or weak to the triager    Negative: Fever > 103 F (39.4 C)    Negative: [1] Fever > 101 F (38.3 C) AND [2] age > 60 years    Negative: [1] Fever > 100.0 F (37.8 C) AND [2] bedridden (e.g., nursing home patient, CVA, chronic illness, recovering from surgery)    Negative: [1] HIGH RISK patient (e.g., age > 64 years, diabetes, heart or lung disease, weak immune system) AND [2] new or worsening symptoms    Negative: [1] HIGH RISK patient AND [2] influenza is widespread in the community AND [3] ONE OR MORE respiratory symptoms: cough, sore throat, runny or stuffy nose    Negative: [1] HIGH RISK patient AND [2] influenza exposure within the last 7 days AND [3] ONE OR MORE respiratory symptoms: cough, sore throat, runny or stuffy nose    Negative: Fever present > 3 days (72 hours)    Negative: [1] Fever returns after gone for over 24 hours AND [2] symptoms worse or not improved    Negative: [1] Continuous (nonstop) coughing interferes with work or school AND [2] no improvement using cough treatment per protocol    Answer Assessment - Initial Assessment Questions  1. COVID-19 DIAGNOSIS: \"Who made your Coronavirus (COVID-19) diagnosis?\" \"Was it confirmed by a positive lab test?\" If not diagnosed by a HCP, ask \"Are there lots of cases (community spread) where you live?\" (See public health department website, if unsure)      Not confirmed  2. COVID-19 EXPOSURE: \"Was there any known exposure to COVID before the symptoms began?\" CDC Definition of close contact: within 6 feet (2 meters) for a total of 15 minutes or more over a 24-hour period.       Yes  3. ONSET: \"When did the COVID-19 symptoms start?\"       Saturday 9/18  4. WORST SYMPTOM: \"What is your worst symptom?\" (e.g., cough, fever, shortness of breath, muscle aches)      dizziness  5. COUGH: \"Do you " "have a cough?\" If so, ask: \"How bad is the cough?\"        no  6. FEVER: \"Do you have a fever?\" If so, ask: \"What is your temperature, how was it measured, and when did it start?\"      Yes, hasnt checked but felt feverish  7. RESPIRATORY STATUS: \"Describe your breathing?\" (e.g., shortness of breath, wheezing, unable to speak)       Mild SOB with activity  8. BETTER-SAME-WORSE: \"Are you getting better, staying the same or getting worse compared to yesterday?\"  If getting worse, ask, \"In what way?\"      same  9. HIGH RISK DISEASE: \"Do you have any chronic medical problems?\" (e.g., asthma, heart or lung disease, weak immune system, obesity, etc.)      no  10. PREGNANCY: \"Is there any chance you are pregnant?\" \"When was your last menstrual period?\"        no  11. OTHER SYMPTOMS: \"Do you have any other symptoms?\"  (e.g., chills, fatigue, headache, loss of smell or taste, muscle pain, sore throat; new loss of smell or taste especially support the diagnosis of COVID-19)        Headache, dizziness, chills    Protocols used: CORONAVIRUS (COVID-19) DIAGNOSED OR [id number]-EH 3.25      "

## 2021-09-22 ENCOUNTER — OFFICE VISIT (OUTPATIENT)
Dept: FAMILY MEDICINE | Facility: OTHER | Age: 26
End: 2021-09-22
Attending: FAMILY MEDICINE
Payer: COMMERCIAL

## 2021-09-22 DIAGNOSIS — Z20.822 SUSPECTED 2019 NOVEL CORONAVIRUS INFECTION: ICD-10-CM

## 2021-09-22 PROCEDURE — U0005 INFEC AGEN DETEC AMPLI PROBE: HCPCS | Mod: ZL

## 2021-09-23 LAB — SARS-COV-2 RNA RESP QL NAA+PROBE: NEGATIVE

## 2021-09-29 DIAGNOSIS — A60.04 HERPES SIMPLEX VULVOVAGINITIS: ICD-10-CM

## 2021-09-30 RX ORDER — VALACYCLOVIR HYDROCHLORIDE 500 MG/1
TABLET, FILM COATED ORAL
Qty: 30 TABLET | Refills: 3 | Status: SHIPPED | OUTPATIENT
Start: 2021-09-30 | End: 2022-04-05

## 2021-10-03 ENCOUNTER — HEALTH MAINTENANCE LETTER (OUTPATIENT)
Age: 26
End: 2021-10-03

## 2021-11-19 DIAGNOSIS — G43.719 INTRACTABLE CHRONIC MIGRAINE WITHOUT AURA AND WITHOUT STATUS MIGRAINOSUS: ICD-10-CM

## 2021-11-23 RX ORDER — DILTIAZEM HYDROCHLORIDE 120 MG/1
CAPSULE, EXTENDED RELEASE ORAL
Qty: 30 CAPSULE | Refills: 3 | Status: SHIPPED | OUTPATIENT
Start: 2021-11-23 | End: 2022-04-05

## 2021-11-23 NOTE — TELEPHONE ENCOUNTER
DILTIAZEM      Last Written Prescription Date:  7-  Last Fill Quantity: 30,   # refills: 3  Last Office Visit: 7-  Future Office visit:    Next 5 appointments (look out 90 days)    Nov 29, 2021  1:30 PM  (Arrive by 1:15 PM)  Elvira Patel [facility] [address]  [facility] [place] [address]  [phone]           Routing refill request to provider for review/approval because:
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
## 2021-11-29 ENCOUNTER — OFFICE VISIT (OUTPATIENT)
Dept: FAMILY MEDICINE | Facility: OTHER | Age: 26
End: 2021-11-29
Attending: PHYSICIAN ASSISTANT
Payer: COMMERCIAL

## 2021-11-29 VITALS
WEIGHT: 151 LBS | SYSTOLIC BLOOD PRESSURE: 112 MMHG | HEART RATE: 94 BPM | RESPIRATION RATE: 16 BRPM | BODY MASS INDEX: 24.27 KG/M2 | DIASTOLIC BLOOD PRESSURE: 70 MMHG | OXYGEN SATURATION: 93 % | HEIGHT: 66 IN | TEMPERATURE: 97.5 F

## 2021-11-29 DIAGNOSIS — E87.6 LOW BLOOD POTASSIUM: ICD-10-CM

## 2021-11-29 DIAGNOSIS — M25.511 ACUTE PAIN OF BOTH SHOULDERS: ICD-10-CM

## 2021-11-29 DIAGNOSIS — N92.6 IRREGULAR PERIODS: ICD-10-CM

## 2021-11-29 DIAGNOSIS — Z11.3 SCREEN FOR STD (SEXUALLY TRANSMITTED DISEASE): ICD-10-CM

## 2021-11-29 DIAGNOSIS — M25.512 ACUTE PAIN OF BOTH SHOULDERS: ICD-10-CM

## 2021-11-29 DIAGNOSIS — L65.9 HAIR LOSS: ICD-10-CM

## 2021-11-29 DIAGNOSIS — Z00.00 ROUTINE GENERAL MEDICAL EXAMINATION AT A HEALTH CARE FACILITY: Primary | ICD-10-CM

## 2021-11-29 LAB
ERYTHROCYTE [DISTWIDTH] IN BLOOD BY AUTOMATED COUNT: 11.9 % (ref 10–15)
HCG UR QL: NEGATIVE
HCT VFR BLD AUTO: 38.3 % (ref 35–47)
HGB BLD-MCNC: 13.4 G/DL (ref 11.7–15.7)
MCH RBC QN AUTO: 31.9 PG (ref 26.5–33)
MCHC RBC AUTO-ENTMCNC: 35 G/DL (ref 31.5–36.5)
MCV RBC AUTO: 91 FL (ref 78–100)
PLATELET # BLD AUTO: 257 10E3/UL (ref 150–450)
RBC # BLD AUTO: 4.2 10E6/UL (ref 3.8–5.2)
WBC # BLD AUTO: 7.2 10E3/UL (ref 4–11)

## 2021-11-29 PROCEDURE — 80048 BASIC METABOLIC PNL TOTAL CA: CPT | Mod: ZL | Performed by: PHYSICIAN ASSISTANT

## 2021-11-29 PROCEDURE — 85027 COMPLETE CBC AUTOMATED: CPT | Mod: ZL | Performed by: PHYSICIAN ASSISTANT

## 2021-11-29 PROCEDURE — G0145 SCR C/V CYTO,THINLAYER,RESCR: HCPCS | Mod: ZL | Performed by: PHYSICIAN ASSISTANT

## 2021-11-29 PROCEDURE — 84443 ASSAY THYROID STIM HORMONE: CPT | Mod: ZL | Performed by: PHYSICIAN ASSISTANT

## 2021-11-29 PROCEDURE — 87491 CHLMYD TRACH DNA AMP PROBE: CPT | Mod: ZL | Performed by: PHYSICIAN ASSISTANT

## 2021-11-29 PROCEDURE — 81025 URINE PREGNANCY TEST: CPT | Mod: ZL | Performed by: PHYSICIAN ASSISTANT

## 2021-11-29 PROCEDURE — 36415 COLL VENOUS BLD VENIPUNCTURE: CPT | Mod: ZL | Performed by: PHYSICIAN ASSISTANT

## 2021-11-29 PROCEDURE — 87624 HPV HI-RISK TYP POOLED RSLT: CPT | Mod: ZL | Performed by: PHYSICIAN ASSISTANT

## 2021-11-29 PROCEDURE — 99395 PREV VISIT EST AGE 18-39: CPT | Performed by: PHYSICIAN ASSISTANT

## 2021-11-29 RX ORDER — CYCLOBENZAPRINE HCL 10 MG
TABLET ORAL
Qty: 20 TABLET | Refills: 0 | Status: SHIPPED | OUTPATIENT
Start: 2021-11-29 | End: 2022-04-05

## 2021-11-29 RX ORDER — NAPROXEN 500 MG/1
500 TABLET ORAL 2 TIMES DAILY WITH MEALS
Qty: 60 TABLET | Refills: 0 | Status: SHIPPED | OUTPATIENT
Start: 2021-11-29 | End: 2021-12-27

## 2021-11-29 ASSESSMENT — ANXIETY QUESTIONNAIRES
6. BECOMING EASILY ANNOYED OR IRRITABLE: NEARLY EVERY DAY
7. FEELING AFRAID AS IF SOMETHING AWFUL MIGHT HAPPEN: NOT AT ALL
IF YOU CHECKED OFF ANY PROBLEMS ON THIS QUESTIONNAIRE, HOW DIFFICULT HAVE THESE PROBLEMS MADE IT FOR YOU TO DO YOUR WORK, TAKE CARE OF THINGS AT HOME, OR GET ALONG WITH OTHER PEOPLE: SOMEWHAT DIFFICULT
3. WORRYING TOO MUCH ABOUT DIFFERENT THINGS: MORE THAN HALF THE DAYS
GAD7 TOTAL SCORE: 15
5. BEING SO RESTLESS THAT IT IS HARD TO SIT STILL: NEARLY EVERY DAY
1. FEELING NERVOUS, ANXIOUS, OR ON EDGE: MORE THAN HALF THE DAYS
4. TROUBLE RELAXING: NEARLY EVERY DAY
2. NOT BEING ABLE TO STOP OR CONTROL WORRYING: MORE THAN HALF THE DAYS

## 2021-11-29 ASSESSMENT — PAIN SCALES - GENERAL: PAINLEVEL: SEVERE PAIN (7)

## 2021-11-29 ASSESSMENT — MIFFLIN-ST. JEOR: SCORE: 1428.74

## 2021-11-29 NOTE — NURSING NOTE
"Chief Complaint   Patient presents with     Physical       Initial /70   Pulse 94   Temp 97.5  F (36.4  C) (Tympanic)   Resp 16   Ht 1.664 m (5' 5.5\")   Wt 68.5 kg (151 lb)   LMP  (LMP Unknown)   SpO2 93%   Breastfeeding No   BMI 24.75 kg/m   Estimated body mass index is 24.75 kg/m  as calculated from the following:    Height as of this encounter: 1.664 m (5' 5.5\").    Weight as of this encounter: 68.5 kg (151 lb).  Medication Reconciliation: complete  Yadi Brock LPN    "

## 2021-11-30 LAB
ANION GAP SERPL CALCULATED.3IONS-SCNC: 8 MMOL/L (ref 3–14)
BUN SERPL-MCNC: 13 MG/DL (ref 7–30)
CALCIUM SERPL-MCNC: 8.6 MG/DL (ref 8.5–10.1)
CHLORIDE BLD-SCNC: 103 MMOL/L (ref 94–109)
CO2 SERPL-SCNC: 27 MMOL/L (ref 20–32)
CREAT SERPL-MCNC: 0.72 MG/DL (ref 0.52–1.04)
GFR SERPL CREATININE-BSD FRML MDRD: >90 ML/MIN/1.73M2
GLUCOSE BLD-MCNC: 86 MG/DL (ref 70–99)
POTASSIUM BLD-SCNC: 3.2 MMOL/L (ref 3.4–5.3)
SODIUM SERPL-SCNC: 138 MMOL/L (ref 133–144)
TSH SERPL DL<=0.005 MIU/L-ACNC: 0.68 MU/L (ref 0.4–4)

## 2021-11-30 ASSESSMENT — PATIENT HEALTH QUESTIONNAIRE - PHQ9: SUM OF ALL RESPONSES TO PHQ QUESTIONS 1-9: 16

## 2021-11-30 ASSESSMENT — ANXIETY QUESTIONNAIRES: GAD7 TOTAL SCORE: 15

## 2021-12-01 ENCOUNTER — VIRTUAL VISIT (OUTPATIENT)
Dept: PSYCHIATRY | Facility: OTHER | Age: 26
End: 2021-12-01
Attending: PSYCHIATRY & NEUROLOGY
Payer: COMMERCIAL

## 2021-12-01 DIAGNOSIS — F33.2 MAJOR DEPRESSIVE DISORDER, RECURRENT SEVERE WITHOUT PSYCHOTIC FEATURES (H): ICD-10-CM

## 2021-12-01 DIAGNOSIS — F41.0 PANIC DISORDER WITHOUT AGORAPHOBIA: ICD-10-CM

## 2021-12-01 LAB
C TRACH DNA SPEC QL PROBE+SIG AMP: NEGATIVE
N GONORRHOEA DNA SPEC QL NAA+PROBE: NEGATIVE

## 2021-12-01 PROCEDURE — 99213 OFFICE O/P EST LOW 20 MIN: CPT | Mod: 95 | Performed by: PSYCHIATRY & NEUROLOGY

## 2021-12-01 RX ORDER — DIAZEPAM 10 MG
TABLET ORAL
Qty: 30 TABLET | Refills: 3 | Status: SHIPPED | OUTPATIENT
Start: 2021-12-01 | End: 2022-05-19

## 2021-12-01 RX ORDER — FLUOXETINE 10 MG/1
10 CAPSULE ORAL DAILY
Qty: 30 CAPSULE | Refills: 11 | Status: SHIPPED | OUTPATIENT
Start: 2021-12-01 | End: 2021-12-01 | Stop reason: DRUGHIGH

## 2021-12-01 ASSESSMENT — ANXIETY QUESTIONNAIRES
1. FEELING NERVOUS, ANXIOUS, OR ON EDGE: MORE THAN HALF THE DAYS
6. BECOMING EASILY ANNOYED OR IRRITABLE: NEARLY EVERY DAY
6. BECOMING EASILY ANNOYED OR IRRITABLE: NEARLY EVERY DAY
5. BEING SO RESTLESS THAT IT IS HARD TO SIT STILL: MORE THAN HALF THE DAYS
3. WORRYING TOO MUCH ABOUT DIFFERENT THINGS: NEARLY EVERY DAY
IF YOU CHECKED OFF ANY PROBLEMS ON THIS QUESTIONNAIRE, HOW DIFFICULT HAVE THESE PROBLEMS MADE IT FOR YOU TO DO YOUR WORK, TAKE CARE OF THINGS AT HOME, OR GET ALONG WITH OTHER PEOPLE: SOMEWHAT DIFFICULT
7. FEELING AFRAID AS IF SOMETHING AWFUL MIGHT HAPPEN: NOT AT ALL
IF YOU CHECKED OFF ANY PROBLEMS ON THIS QUESTIONNAIRE, HOW DIFFICULT HAVE THESE PROBLEMS MADE IT FOR YOU TO DO YOUR WORK, TAKE CARE OF THINGS AT HOME, OR GET ALONG WITH OTHER PEOPLE: SOMEWHAT DIFFICULT
7. FEELING AFRAID AS IF SOMETHING AWFUL MIGHT HAPPEN: NOT AT ALL
2. NOT BEING ABLE TO STOP OR CONTROL WORRYING: MORE THAN HALF THE DAYS
5. BEING SO RESTLESS THAT IT IS HARD TO SIT STILL: MORE THAN HALF THE DAYS
2. NOT BEING ABLE TO STOP OR CONTROL WORRYING: MORE THAN HALF THE DAYS
4. TROUBLE RELAXING: NEARLY EVERY DAY
GAD7 TOTAL SCORE: 15
3. WORRYING TOO MUCH ABOUT DIFFERENT THINGS: NEARLY EVERY DAY
GAD7 TOTAL SCORE: 15
1. FEELING NERVOUS, ANXIOUS, OR ON EDGE: MORE THAN HALF THE DAYS

## 2021-12-01 ASSESSMENT — PATIENT HEALTH QUESTIONNAIRE - PHQ9: 5. POOR APPETITE OR OVEREATING: NEARLY EVERY DAY

## 2021-12-01 NOTE — PROGRESS NOTES
"Valorie is a 26 year old who is being evaluated via a billable telephone visit.      What phone number would you like to be contacted at? [phone]  How would you like to obtain your AVS? [facility]     Telephone visit 12  minutes.  4 minutes reviewing chart, ordering medications, documenting. Total visit time 16 minutes.    PSYCHIATRY CLINIC PROGRESS NOTE     SUBJECTIVE / INTERIM HISTORY                                                                       :   Children-  Mayco is 9 yo.  Last visit June '21:   Continue fluoxetine 10 mg daily. Continue diazepam 10 mg daily prn filled 5/10/21 with 1 refill.  Discontinued prazosin 1 mg HS  - things are going okay, \"pretty good actually\"  - got engaged end of June to Stoeny. They are looking for a house. For now she still in [place] and Stoney is in [place]  - feels fluoxetine still helps but feels sx creeping back in. Not as much interest in things, feels mood down. Inquires if we could try increase dose  - not too much new  - Fredis is into wrestling.       SYMPTOMS-  better energy, insomnia still, easily startled, hypervigilance, situations reminscent of trauma cause fear / panic. Sx of anxiety have been better:  Excessive worry, easily overwhelmed, panic attacks.   MEDICAL ROS- low appetite, some weight loss, sometimes nausea. Hip / sciatic nerve pain, Migraines  MEDICAL / SURGICAL HISTORY                pregnant [if applicable]--no   Medical Team:     PMD- Dr. Jo Muro    Therapist- [facility]   Patient Active Problem List   Diagnosis     Anxiety state     Migraine     Spasm of muscle     ACP (advance care planning)     Panic disorder without agoraphobia     Recurrent major depressive disorder, in full remission (H)     Chronic, continuous use of opioids     ALLERGY   Bactrim [sulfamethoxazole w-trimethoprim], Morphine hcl, Clindamycin, Nitrofurantoin, Nitrofurantoin monohydrate macrocrystals, and Loracarbef  MEDICATIONS                                            "                                                  Current Outpatient Medications   Medication Sig     cyclobenzaprine (FLEXERIL) 10 MG tablet 1 tab HS prn for muscle spasm     diazepam (VALIUM) 10 MG tablet TAKE 1 TABLET(10 MG) BY MOUTH DAILY AS NEEDED FOR ANXIETY     DILT- MG 24 hr capsule TAKE 1 CAPSULE(120 MG) BY MOUTH DAILY     FLUoxetine (PROZAC) 10 MG capsule Take 1 capsule (10 mg) by mouth daily     nabumetone (RELAFEN) 750 MG tablet Take 1 tablet (750 mg) by mouth 2 times daily     naproxen (NAPROSYN) 500 MG tablet Take 1 tablet (500 mg) by mouth 2 times daily (with meals)     ondansetron (ZOFRAN) 8 MG tablet Take 1 tablet (8 mg) by mouth every 8 hours as needed for nausea     SUMAtriptan (IMITREX) 100 MG tablet Take 1 tablet (100 mg) by mouth at onset of headache for migraine May repeat in 2 hours if needed: max 2/day; average number of headaches monthly     valACYclovir (VALTREX) 500 MG tablet TAKE 1 TABLET(500 MG) BY MOUTH DAILY     No current facility-administered medications for this visit.       VITALS   LMP  (LMP Unknown)      PHQ9                       PHQ-9 SCORE 6/14/2021 11/29/2021 12/1/2021   PHQ-9 Total Score - - -   PHQ-9 Total Score 15 16 17       MENTAL STATUS EXAM                                                                                       Mood  anxious and depressed  Thought process, including associations, was unremarkable and thought content was devoid of suicidal and homicidal ideation and psychotic thought. No hallucinations. Insight was good. Judgment was intact and adequate for safety. Fund of knowledge was intact. Pt demonstrates no obvious problems with attention, concentration, language, recent or remote memory although these were not formally tested.     ASSESSMENT                                                                                                      HISTORICAL:  Initial psych note 2/13/15       Notes: Buspar ineffective. Zoloft. Effexor up to 75 :  ineffective and made her feel foggy and sick.   she stopped it. Paxil ineffective. Gabapentin : sedation. Past citalopram in med section (2013). Propranolol 10 mg BID ineffective. Lexapro was on 10 mg then ran out in between visits. Prazosin; insomnia . topamax fatigue, ineffective    Valorie Awad is a 25 yo with panic disorder, MDD, recurrent,and NITZA and PTSD. We were going to try Viibryd and wasn't covered. We ended up starting Prozac and does help however today Valorie notes she feels efficacy has faded thus inquires if we can go up on dose. We agreed to try an increase to 20 mg. We last touched base beginning of summer and since then Valorie got engaged to Stoney. She is still in [place], he's in [facility] and they are looking for a place to move in together. Fredis doing well, 7 yo, in wresting. We agreed Valorie will give a call prn to set follow-up apptmt.     TREATMENT RISK STATEMENT:  The risks, benefits, alternatives and potential adverse effects have been explained and are understood by the pt.  The pt agrees to the treatment plan with the ability to do so.  Discussion of specific concerns included- potential SEs meds.  The pt knows to call the clinic for any problems or access emergency care if needed.   There are no medical considerations relevant to treatment, as noted above.  Substance use is not a problem as noted above.         DIAGNOSES            PTSD   Panic disorder without agoraphobia  Major depressive disorder, recurrent, moderate  NITZA      PLAN                                                                                                                           1) MEDICATIONS: Increase fluoxetine 10 mg daily to 20 mg daily. Continue diazepam 10 mg daily prn filled today with 3 refills.    2) THERAPY: No Change. She was working with a therapist at [facility]    3) LABS: None    4) PT MONITOR [call for probs]: worsening sx, side effects, if develops SI    5) REFERRALS [CD tx, medical, tests other]:  None    6)  RTC: ~prn

## 2021-12-01 NOTE — NURSING NOTE
"Chief Complaint   Patient presents with     PTSD     Depression     Anxiety       Initial LMP  (LMP Unknown)  Estimated body mass index is 24.75 kg/m  as calculated from the following:    Height as of 11/29/21: 1.664 m (5' 5.5\").    Weight as of 11/29/21: 68.5 kg (151 lb).  Medication Reconciliation: complete  Lsia Brand LPN    "

## 2021-12-01 NOTE — NURSING NOTE
"The patient has been notified of the following:      \"We have found that certain health care needs can be provided without the need for a face to face visit.  This service lets us provide the care you need with a phone conversation.       I will have full access to your [place] medical record during this entire phone call.   I will be taking notes for your medical record.      Since this is like an office visit, we will bill your insurance company for this service.       There are potential benefits and risks of telephone visits (e.g. limits to patient confidentiality) that differ from in-person visits.?  Confidentiality still applies for telephone services, and nobody will record the visit.  It is important to be in a quiet, private space that is free of distractions (including cell phone or other devices) during the visit.??      If during the course of the call I believe a telephone visit is not appropriate, you will not be charged for this service\"     Consent has been obtained for this service by care team member: Yes    "

## 2021-12-02 LAB
BKR LAB AP GYN ADEQUACY: NORMAL
BKR LAB AP GYN INTERPRETATION: NORMAL
BKR LAB AP GYN OTHER FINDINGS: NORMAL
BKR LAB AP HPV REFLEX: NORMAL
BKR LAB AP PREVIOUS ABNORMAL: NORMAL
PATH REPORT.COMMENTS IMP SPEC: NORMAL
PATH REPORT.COMMENTS IMP SPEC: NORMAL
PATH REPORT.RELEVANT HX SPEC: NORMAL

## 2021-12-02 ASSESSMENT — PATIENT HEALTH QUESTIONNAIRE - PHQ9: SUM OF ALL RESPONSES TO PHQ QUESTIONS 1-9: 17

## 2021-12-02 ASSESSMENT — ANXIETY QUESTIONNAIRES: GAD7 TOTAL SCORE: 15

## 2021-12-09 LAB
HUMAN PAPILLOMA VIRUS 16 DNA: NEGATIVE
HUMAN PAPILLOMA VIRUS 18 DNA: NEGATIVE
HUMAN PAPILLOMA VIRUS FINAL DIAGNOSIS: ABNORMAL
HUMAN PAPILLOMA VIRUS OTHER HR: POSITIVE

## 2021-12-24 DIAGNOSIS — M25.512 ACUTE PAIN OF BOTH SHOULDERS: ICD-10-CM

## 2021-12-24 DIAGNOSIS — M25.511 ACUTE PAIN OF BOTH SHOULDERS: ICD-10-CM

## 2021-12-27 RX ORDER — NAPROXEN 500 MG/1
TABLET ORAL
Qty: 60 TABLET | Refills: 0 | Status: SHIPPED | OUTPATIENT
Start: 2021-12-27 | End: 2022-02-08

## 2022-01-30 ENCOUNTER — HOSPITAL ENCOUNTER (EMERGENCY)
Facility: HOSPITAL | Age: 27
Discharge: HOME | End: 2022-01-30
Attending: INTERNAL MEDICINE | Admitting: INTERNAL MEDICINE
Payer: COMMERCIAL

## 2022-01-30 VITALS
RESPIRATION RATE: 14 BRPM | OXYGEN SATURATION: 98 % | DIASTOLIC BLOOD PRESSURE: 75 MMHG | TEMPERATURE: 97.2 F | BODY MASS INDEX: 24.25 KG/M2 | HEART RATE: 71 BPM | WEIGHT: 148 LBS | SYSTOLIC BLOOD PRESSURE: 123 MMHG

## 2022-01-30 DIAGNOSIS — F43.22 ADJUSTMENT DISORDER WITH ANXIOUS MOOD: ICD-10-CM

## 2022-01-30 PROCEDURE — 99284 EMERGENCY DEPT VISIT MOD MDM: CPT | Performed by: INTERNAL MEDICINE

## 2022-01-30 PROCEDURE — 250N000013 HC RX MED GY IP 250 OP 250 PS 637

## 2022-01-30 PROCEDURE — 99285 EMERGENCY DEPT VISIT HI MDM: CPT

## 2022-01-30 RX ORDER — SUMATRIPTAN 100 MG/1
100 TABLET, FILM COATED ORAL ONCE
Status: COMPLETED | OUTPATIENT
Start: 2022-01-30 | End: 2022-01-30

## 2022-01-30 RX ORDER — SUMATRIPTAN 50 MG/1
TABLET, FILM COATED ORAL
Status: COMPLETED
Start: 2022-01-30 | End: 2022-01-30

## 2022-01-30 RX ADMIN — SUMATRIPTAN SUCCINATE 100 MG: 50 TABLET ORAL at 00:44

## 2022-01-30 RX ADMIN — SUMATRIPTAN SUCCINATE 100 MG: 100 TABLET ORAL at 00:44

## 2022-01-30 ASSESSMENT — ENCOUNTER SYMPTOMS
FEVER: 0
DYSPHORIC MOOD: 0
DECREASED CONCENTRATION: 0
ABDOMINAL PAIN: 0
NERVOUS/ANXIOUS: 1
CONFUSION: 0
DEPRESSED MOOD: 1
SHORTNESS OF BREATH: 0

## 2022-01-30 NOTE — ED NOTES
1/30/2022  Valorie Awad 1995     [facility] Crisis Assessment    Patient was assessed: remote  Patient location: [facility] ED08    Referral Data and Chief Complaint  Pt is a 26 year old who uses female pronouns. Patient presented to the ED via police and was referred to the ED by family/friends. Patient is presenting to the ED for the following concerns: psychiatric evaluation following threats of suicide and self-harm.      Informed Consent and Assessment Methods    Patient is her own guardian. Writer met with patient and explained the crisis assessment process, including applicable information disclosures and limits to confidentiality, assessed understanding of the process, and obtained consent to proceed with the assessment. Patient was observed to be able to participate in the assessment as evidenced by her engagement with [facility]. Assessment methods included conducting a formal interview with patient, review of medical records, collaboration with medical staff, and obtaining relevant collateral information from family and community providers when available.    Narrative Summary of Presenting Problem and Current Functioning  What led to the patient presenting for crisis services, factors that make the crisis life threatening or complex, stressors, how is this disrupting the patient's life, and how current functioning is in comparison to baseline. How is patient presenting during the assessment.     Pt got into an argument with her now ex-fiance this evening. She became upset with him and started to make threats of self-harm by cutting her wrists. Pt's ex-fiance became concerned by the statements and called [facility]YNOLDS to conduct a welfare check on pt. Police brought pt to the ED for evaluation.    History of the Crisis  Duration of the current crisis, coping skills attempted to reduce the crisis, community resources used, and past presentations.    Pt is diagnosed with depression and anxiety. She does not have an  outpatient therapist but sees a psychiatrist through [facility] in [place]. Pt does not find therapy helpful and does not wish to reconnect with a therapist.    Collateral Information  [facility] reviewed pt's medical records in [facility]. According to Epic, pt consistently meets with Dr. Jyoti Leahy MD for medication management. Per primary care provider is Cecy Gonzalez PA-C at [facility] in [place].    Risk Assessment    Risk of Harm to Self     ESS-6  1.a. Over the past 2 weeks, have you had thoughts of killing yourself? No  1.b. Have you ever attempted to kill yourself and, if yes, when did this last happen? No   2. Recent or current suicide plan? No   3. Recent or current intent to act on ideation? No  4. Lifetime psychiatric hospitalization? No  5. Pattern of excessive substance use? No  6. Current irritability, agitation, or aggression? Yes  Scoring note: BOTH 1a and 1b must be yes for it to score 1 point, if both are not yes it is zero. All others are 1 point per number. If all questions 1a/1b - 6 are no, risk is negligible. If one of 1a/1b is yes, then risk is mild. If either question 2 or 3, but not both, is yes, then risk is automatically moderate regardless of total score. If both 2 and 3 are yes, risk is automatically high regardless of total score.     Score: 1, mild risk    The patient has the following risk factors for suicide: depressive symptoms, chronic pain and recent loss    Is the patient experiencing current suicidal ideation: No    Is the patient engaging in preparatory suicide behaviors (formulating how to act on plan, giving away possessions, saying goodbye, displaying dramatic behavior changes, etc)? No    Does the patient have access to firearms or other lethal means? no    The patient has the following protective factors: established relationship community mental health provider(s), sense of obligation to people/pets and safe/stable housing    Support system  information: Family, friends, and psychiatrist    Patient strengths: Pt is able to communicate her needs and seek assistance when     Does the patient engage in non-suicidal self-injurious behavior (NSSI/SIB)? no    Is the patient vulnerable to sexual exploitation?  No    Is the patient experiencing abuse or neglect? no    Is the patient a vulnerable adult? No      Risk of Harm to Others  The patient has the following risk factors of harm to others: no risk factors identified    Does the patient have thoughts of harming others? No    Is the patient engaging in sexually inappropriate behavior?  no       Current Substance Abuse    Is there recent substance abuse? Substance type(s): medical marijuana Frequency: daily Quantity: unknown Method: unknown Duration: unknown Last use: today    Was a urine drug screen or blood alcohol level obtained: No    CAGE AID  Have you felt you ought to cut down on your drinking or drug use?  No  Have people annoyed you by criticizing your drinking or drug use? No  Have you felt bad or guilty about your drinking or drug use? No  Have you ever had a drink or used drugs first thing in the morning to steady your nerves or to get rid of a hangover? No  Score: 0/4       Current Symptoms/Concerns    Symptoms  Attention, hyperactivity, and impulsivity symptoms present: No    Anxiety symptoms present: No      Appetite symptoms present: No     Behavioral difficulties present: No     Cognitive impairment symptoms present: No    Depressive symptoms present: Yes Impaired decision making  and Increased irritability/agitation     Eating disorder symptoms present: No    Learning disabilities, cognitive challenges, and/or developmental disorder symptoms present: No     Manic/hypomanic symptoms present: No    Personality and interpersonal functioning difficulties present : No    Psychosis symptoms present: No      Sleep difficulties present: No    Substance abuse disorder symptoms present: No     Trauma  and stressor related symptoms present: No           Mental Status Exam   Affect: Flat   Appearance: Appropriate    Attention Span/Concentration: Other: variable?    Eye Contact: Variable   Fund of Knowledge: Appropriate    Language /Speech Content: Fluent   Language /Speech Volume: Normal    Language /Speech Rate/Productions: Minimally Responsive and Slow    Recent Memory: Intact   Remote Memory: Intact   Mood: Irritable    Orientation to Person: Yes    Orientation to Place: Yes   Orientation to Time of Day: Yes    Orientation to Date: Yes    Situation (Do they understand why they are here?): Yes    Psychomotor Behavior: Normal    Thought Content: Clear   Thought Form: Intact       Mental Health and Substance Abuse History    History  Current and historical diagnoses or mental health concerns: MDD, NITZA, PTSD, panic disorder    Prior MH services (inpatient, programmatic care, outpatient, etc) : Yes outpatient psychiatry and therapy    History of substance abuse: No    Prior RANDY services (inpatient, programmatic care, detox, outpatient, etc) : No    History of commitment: No    Family history of MH/RANDY: Yes father attempted suicide when pt was 4    Trauma history: Yes diagnosis of PTSD, unspecified trauma    Medication  Psychotropic medications: Yes. Pt is currently taking fluoxetine and diazepam. Medication compliant: Yes. Recent medication changes: Yes fluoxetine increased to 20mg daily on 12/1/21    Current Care Team  Primary Care Provider: Yes. Name: Cecy Gonzalez PA-C. Location: [facility]. Date of last visit: 11/29/2021. Frequency: as needed. Perceived helpfulness: unknown.    Psychiatrist: Yes. Name: Jyoti Leahy MD. Location: [facility]. Date of last visit: 12/1/2021. Frequency: every 2-3 months. Perceived helpfulness: yes, helpful.    Therapist: No    : No    CTSS or ARMHS: No    ACT Team: No    Other: No    Release of Information  Was a release of  information signed: Yes. Providers included on the release: PCP and psychiatrist      Biopsychosocial Information    Socioeconomic Information  Current living situation: lives with her son    Employment/income source: unemployed    Relevant legal issues: none    Cultural, Baptism, or spiritual influences on mental health care: none specified    Is the patient active in the  or a : No      Relevant Medical Concerns   Patient identifies concerns with completing ADLs? No     Patient can ambulate independently? Yes     Other medical concerns? Yes and chronic migraines     History of concussion or TBI? No        Diagnosis    300.01 (F41.0) Panic Disorder - by history     300.02 (F41.1) Generalized Anxiety Disorder - by history     296.31 (F33.0) Major Depressive Disorder, Recurrent Episode, Mild _ - provisional      309.81 (F43.10) Post-traumatic Stress disorder- by history       Therapeutic Intervention  The following therapeutic methodologies were employed when working with the patient: establishing rapport, active listening, establishing a discharge plan and safety planning. Patient response to intervention: Pt was minimally engaged in assessment and had limited response to therapeutic interventions.      Disposition  Recommended disposition: Individual Therapy and Medication Management      Reviewed case and recommendations with attending provider. Attending Name: Dr. Carson Valdivia MD      Attending concurs with disposition: Yes      Patient concurs with disposition: Yes      Guardian concurs with disposition: NA     Final disposition: Medication management.       Clinical Substantiation of Recommendations   Rationale with supporting factors for disposition and diagnosis.     Pt presents with flat affect and irritable mood. She had minimal participation in the assessment process and mainly answered questions with one or two-word responses. Pt reports a history of depression and anxiety but indicates  "that her symptoms are well-controlled. Pt takes her medications as prescribed and sees her psychiatrist on a regular basis. She is not currently connected to outpatient therapy and does not wish to reconnect with a therapist because she does not find therapy helpful. Pt got into an argument tonight with her ex-fiance and make suicidal statements and threats of self-harm to him on the phone. However, pt denies suicidal thoughts or thoughts of self-harm. She has no history of suicide attempts and \"would not do that to my son.\" Pt does not present as a risk of harm to herself or others, nor does she meet inpatient criteria at this time.       Assessment Details  Patient interview started at: 02:47 and completed at: 02:55.    Total duration spent on the patient case in minutes: .25 hrs     CPT code(s) utilized: 55013 - Psychotherapy for Crisis - 60 (30-74*) min       Aftercare and Safety Planning  Follow up plans with MH/RANDY services: Yes pt to follow up with her current psychiatrist as scheduled      Aftercare plan placed in the AVS and provided to patient: Yes. Given to patient by ED staff    Shayna Lucas [facility]      Aftercare Plan  If I am feeling unsafe or I am in a crisis, I will:   Contact my established care providers   Call the National Suicide Prevention Lifeline: 220.248.4430   Go to the nearest emergency room   Call 911     Warning signs that I or other people might notice when a crisis is developing for me: agitation, frustration, sadness, crying, increased anxiety    Things I am able to do on my own to cope or help me feel better: do enjoyable activities to distract and reconnect with the present moment     Things that I am able to do with others to cope or help me better: spend time with my son and parents     Things I can use or do for distraction: enjoyable activities such as reading, listening to music, watching movies/TV/videos, exercise     Changes I can make to support my mental health and " "wellness: create a safe and relaxing space to calm down and reconnect to the present moment by engaging in my 5 senses     People in my life that I can ask for help: Family, friends, psychiatrist     Your [facility] has a mental health crisis team you can call 24/7: [facility]- [phone]    Other things that are important when I m in crisis: being around supportive individuals     Additional resources and information: talk to your psychiatrist about therapy referrals if you would like to try working with a therapist again for additional support      Crisis Lines  Crisis Text Line  Text 208233  You will be connected with a trained live crisis counselor to provide support.    National Hope Line  1.800.SUICIDE [6372195]      Community Resources  Fast Tracker  Linking people to mental health and substance use disorder resources  [employer].org     [place] Mental Health Warm Line  Peer to peer support  Monday thru Saturday, 12 pm to 10 pm  428.316.7753 or 8.743.732.7674  Text \"Support\" to 37703    National [place] on Mental Illness ([place])  162.002.5288 or 1.888.BRENADETTE.HELPS        Mental Health Apps  My3  https://my3app.org/    VirtualHopeBox  [email or web address]/virtual-hope-box/          "

## 2022-01-30 NOTE — ED TRIAGE NOTES
"Pt brought from home by HPD. Pt tearful and crying upon arrival. Pt and HPD report that she called her fiance and stated to him that she was \"in the bathroom and was going to cut herself.\" Upon HPD arrival pt stated \"I just want to die\" but was cooperative with PD. Pt reports she did make those statements but has no intention of harming herself and stated \"I am angry with my fiance and we are fighting that is why I made those statements.\" Pt reports she feels depressed but stated \"I watched my dad cut his wrists when I was 4 years old and I would never do that to my child.\" Pt reports her child is currently at the grandparents home.   "

## 2022-01-30 NOTE — DISCHARGE INSTRUCTIONS
"Aftercare Plan  If I am feeling unsafe or I am in a crisis, I will:   Contact my established care providers   Call the National Suicide Prevention Lifeline: 367.374.8283   Go to the nearest emergency room   Call 911     Warning signs that I or other people might notice when a crisis is developing for me: agitation, frustration, sadness, crying, increased anxiety    Things I am able to do on my own to cope or help me feel better: do enjoyable activities to distract and reconnect with the present moment     Things that I am able to do with others to cope or help me better: spend time with my son and parents     Things I can use or do for distraction: enjoyable activities such as reading, listening to music, watching movies/TV/videos, exercise     Changes I can make to support my mental health and wellness: create a safe and relaxing space to calm down and reconnect to the present moment by engaging in my 5 senses     People in my life that I can ask for help: Family, friends, psychiatrist     Your [facility] has a mental health crisis team you can call 24/7: [facility] Crisis- [phone]    Other things that are important when I m in crisis: being around supportive individuals     Additional resources and information: talk to your psychiatrist about therapy referrals if you would like to try working with a therapist again for additional support      Crisis Lines  Crisis Text Line  Text 123411  You will be connected with a trained live crisis counselor to provide support.    National Hope Line  1.800.SUICIDE [0693533]      Community Resources  Fast Tracker  Linking people to mental health and substance use disorder resources  [employer]trackermn.org     [place] Mental Health Warm Line  Peer to peer support  Monday thru Saturday, 12 pm to 10 pm  102.346.0526 or 4.593.379.6350  Text \"Support\" to 51808    National [place] on Mental Illness ([place])  648.951.6722 or 1.888.BERNADETTE.HELPS        Mental Health " Apps  My3  https://myAuterrapp.org/    VirtualHopeBox  [email or web address]/apps/virtual-hope-box/

## 2022-01-30 NOTE — ED PROVIDER NOTES
History     Chief Complaint   Patient presents with     Psychiatric Evaluation     The history is provided by the patient.   Mental Health Problem  Presenting symptoms: depression and suicidal threats    Presenting symptoms: no self-mutilation and no suicidal thoughts    Patient accompanied by:  Law enforcement  Degree of incapacity (severity):  Mild  Onset quality:  Sudden  Timing:  Sporadic  Progression:  Resolved  Chronicity:  New  Associated symptoms: anxiety    Associated symptoms: no abdominal pain and no chest pain          Allergies:  Allergies   Allergen Reactions     Bactrim [Sulfamethoxazole W-Trimethoprim] Rash     Morphine Hcl Nausea and Vomiting     Clindamycin Other (See Comments)     Thrush       Nitrofurantoin      Macrobid      Nitrofurantoin Monohydrate Macrocrystals      Macrobid      Loracarbef Rash     Lorabid       Problem List:    Patient Active Problem List    Diagnosis Date Noted     Chronic, continuous use of opioids 09/23/2019     Priority: Medium     Panic disorder without agoraphobia 10/18/2018     Priority: Medium     Patient is followed by  for ongoing prescription of benzodiazepines.  All refills should be approved by this provider, or covering partner.    Medication(s): Valium 10 mg.   Maximum quantity per month: 30  Clinic visit frequency required: Q 3 months     Controlled substance agreement on file: Yes       Date(s): 9.18.19  Benzodiazepine use reviewed by psychiatry:  Yes       Date(s): 9.18.19    Last [place]MP website verification:  done on 9.18.19  [email or web address]           Recurrent major depressive disorder, in full remission (H) 10/18/2018     Priority: Medium     ACP (advance care planning) 03/22/2017     Priority: Medium     Advance Care Planning 7/6/2017: ACP Review of Chart / Resources Provided:  Reviewed chart for advance care plan.  Valorie ABELARDO Awad has no plan or code status on file. Discussed available resources and provided with  information.   Added by KARLY CORBETT                 Spasm of muscle 08/12/2015     Priority: Medium     Migraine 10/10/2007     Priority: Medium     Problem list name updated by automated process. Provider to review       Anxiety state 09/20/2001     Priority: Medium     Problem list name updated by automated process. Provider to review          Past Medical History:    Past Medical History:   Diagnosis Date     Abnormal Pap smear      Anxiety 9/20/2001     Marijuana use      Migraine headaches 10/10/2007     Supervision of other normal pregnancy        Past Surgical History:    Past Surgical History:   Procedure Laterality Date     Comminuted fracture elbow Left 10/25/2013    lauren placed     CYSTOSCOPY      bladder distention     TONSILLECTOMY         Family History:    Family History   Problem Relation Age of Onset     Other - See Comments Father         alcoholism     Depression Father      Mental Illness Father      Depression Mother      Irritable Bowel Syndrome Mother      Mental Illness Mother      Other - See Comments Mother         migraine headache     Other - See Comments Maternal Grandmother         blood disease     Lipids Maternal Grandmother         hyperlipidemia     Thyroid Disease Maternal Grandmother      Diabetes No family hx of      Asthma No family hx of        Social History:  Marital Status:  Single [1]  Social History     Tobacco Use     Smoking status: Never Smoker     Smokeless tobacco: Never Used   Substance Use Topics     Alcohol use: Yes     Alcohol/week: 0.0 standard drinks     Comment: social     Drug use: No        Medications:    cyclobenzaprine (FLEXERIL) 10 MG tablet  diazepam (VALIUM) 10 MG tablet  DILT- MG 24 hr capsule  FLUoxetine (PROZAC) 20 MG capsule  nabumetone (RELAFEN) 750 MG tablet  naproxen (NAPROSYN) 500 MG tablet  ondansetron (ZOFRAN) 8 MG tablet  SUMAtriptan (IMITREX) 100 MG tablet  valACYclovir (VALTREX) 500 MG tablet          Review of Systems    Constitutional: Negative for fever.   Respiratory: Negative for shortness of breath.    Cardiovascular: Negative for chest pain.   Gastrointestinal: Negative for abdominal pain.   Psychiatric/Behavioral: Negative for confusion, decreased concentration, dysphoric mood, self-injury and suicidal ideas. The patient is nervous/anxious.    All other systems reviewed and are negative.      Physical Exam   BP: 123/75  Pulse: 101  Temp: 97.2  F (36.2  C)  Resp: 20  Weight: 67.1 kg (148 lb)  SpO2: 99 %      Physical Exam  Constitutional:       General: She is not in acute distress.     Appearance: She is not diaphoretic.   HENT:      Head: Atraumatic.      Mouth/Throat:      Pharynx: No oropharyngeal exudate.   Eyes:      General: No scleral icterus.     Pupils: Pupils are equal, round, and reactive to light.   Cardiovascular:      Heart sounds: Normal heart sounds.   Pulmonary:      Effort: No respiratory distress.      Breath sounds: Normal breath sounds.   Abdominal:      General: Bowel sounds are normal.      Palpations: Abdomen is soft.      Tenderness: There is no abdominal tenderness.   Musculoskeletal:         General: No tenderness.   Skin:     General: Skin is warm.      Findings: No rash.   Psychiatric:         Attention and Perception: Attention and perception normal.         Mood and Affect: Mood is anxious. Affect is tearful.         Speech: Speech normal.         Behavior: Behavior normal. Behavior is cooperative.         Thought Content: Thought content is not paranoid or delusional. Thought content does not include homicidal or suicidal ideation. Thought content does not include homicidal plan.         Cognition and Memory: Cognition normal.         Judgment: Judgment normal.         ED Course                 Procedures                  No results found for this or any previous visit (from the past 24 hour(s)).    Medications   SUMAtriptan (IMITREX) tablet 100 mg (100 mg Oral Given 1/30/22 0044)  "      Assessments & Plan (with Medical Decision Making)   Suicidal threat comment during fight with her finance  Pt denies any suicidal ideas or intent, she start\" I was just mad at him, did mean to do anything\"  DEC assessment appreciated  D C home  I have reviewed the nursing notes.    I have reviewed the findings, diagnosis, plan and need for follow up with the patient.      New Prescriptions    No medications on file       Final diagnoses:   Adjustment disorder with anxious mood       1/30/2022   [facility]     Carson Guerrero MD  01/30/22 0412    "

## 2022-02-03 DIAGNOSIS — M25.512 ACUTE PAIN OF BOTH SHOULDERS: ICD-10-CM

## 2022-02-03 DIAGNOSIS — M25.511 ACUTE PAIN OF BOTH SHOULDERS: ICD-10-CM

## 2022-02-07 NOTE — TELEPHONE ENCOUNTER
Needs new PCP    Naproxen      Last Written Prescription Date:  12.27.21  Last Fill Quantity: #60,   # refills: 0  Last Office Visit: 11.29.21  Future Office visit:       Routing refill request to provider for review/approval because:

## 2022-02-08 RX ORDER — NAPROXEN 500 MG/1
TABLET ORAL
Qty: 60 TABLET | Refills: 0 | Status: SHIPPED | OUTPATIENT
Start: 2022-02-08 | End: 2022-04-05

## 2022-02-08 NOTE — TELEPHONE ENCOUNTER
Attempt # 2  Outcome: No Answer/No Voicemail/Busy   Comment: no answer, no voice mail is set up, sent letter

## 2022-04-05 DIAGNOSIS — F33.2 MAJOR DEPRESSIVE DISORDER, RECURRENT SEVERE WITHOUT PSYCHOTIC FEATURES (H): ICD-10-CM

## 2022-04-05 DIAGNOSIS — A60.04 HERPES SIMPLEX VULVOVAGINITIS: ICD-10-CM

## 2022-04-05 DIAGNOSIS — M25.512 ACUTE PAIN OF BOTH SHOULDERS: ICD-10-CM

## 2022-04-05 DIAGNOSIS — M25.511 ACUTE PAIN OF BOTH SHOULDERS: ICD-10-CM

## 2022-04-05 DIAGNOSIS — F41.0 PANIC DISORDER WITHOUT AGORAPHOBIA: ICD-10-CM

## 2022-04-05 DIAGNOSIS — G43.719 INTRACTABLE CHRONIC MIGRAINE WITHOUT AURA AND WITHOUT STATUS MIGRAINOSUS: ICD-10-CM

## 2022-04-05 RX ORDER — CYCLOBENZAPRINE HCL 10 MG
TABLET ORAL
Qty: 20 TABLET | Refills: 0 | Status: SHIPPED | OUTPATIENT
Start: 2022-04-05 | End: 2023-08-02

## 2022-04-05 RX ORDER — VALACYCLOVIR HYDROCHLORIDE 500 MG/1
500 TABLET, FILM COATED ORAL DAILY
Qty: 30 TABLET | Refills: 3 | Status: SHIPPED | OUTPATIENT
Start: 2022-04-05 | End: 2022-07-05

## 2022-04-05 RX ORDER — DILTIAZEM HYDROCHLORIDE 120 MG/1
CAPSULE, EXTENDED RELEASE ORAL
Qty: 30 CAPSULE | Refills: 3 | Status: SHIPPED | OUTPATIENT
Start: 2022-04-05 | End: 2022-05-27

## 2022-04-05 RX ORDER — NAPROXEN 500 MG/1
TABLET ORAL
Qty: 60 TABLET | Refills: 0 | Status: SHIPPED | OUTPATIENT
Start: 2022-04-05 | End: 2022-09-27

## 2022-04-05 RX ORDER — SUMATRIPTAN 100 MG/1
100 TABLET, FILM COATED ORAL
Qty: 9 TABLET | Refills: 3 | Status: SHIPPED | OUTPATIENT
Start: 2022-04-05 | End: 2022-07-05

## 2022-04-05 RX ORDER — DIAZEPAM 10 MG
TABLET ORAL
Qty: 30 TABLET | Refills: 3 | OUTPATIENT
Start: 2022-04-05

## 2022-04-05 NOTE — TELEPHONE ENCOUNTER
Patient is needing scripts sent to [facility] from [facility]. Medications previously signed by Dr. Muro. Patient has an establish care scheduled on 07/05/22 with Dr. Caballero. Please advise, thank you.

## 2022-04-07 NOTE — TELEPHONE ENCOUNTER
Patient called and notified medication refilled, but prozac and valium and needing to be approved through psychiatry. Patient verbalized understanding.

## 2022-05-27 ENCOUNTER — OFFICE VISIT (OUTPATIENT)
Dept: FAMILY MEDICINE | Facility: OTHER | Age: 27
End: 2022-05-27
Attending: NURSE PRACTITIONER
Payer: COMMERCIAL

## 2022-05-27 VITALS
OXYGEN SATURATION: 99 % | BODY MASS INDEX: 26.06 KG/M2 | HEART RATE: 82 BPM | WEIGHT: 159 LBS | SYSTOLIC BLOOD PRESSURE: 122 MMHG | DIASTOLIC BLOOD PRESSURE: 78 MMHG | TEMPERATURE: 98.1 F

## 2022-05-27 DIAGNOSIS — Z30.019 ENCOUNTER FOR FEMALE BIRTH CONTROL: ICD-10-CM

## 2022-05-27 DIAGNOSIS — F41.0 PANIC DISORDER WITHOUT AGORAPHOBIA: ICD-10-CM

## 2022-05-27 DIAGNOSIS — L65.9 HAIR LOSS: Primary | ICD-10-CM

## 2022-05-27 DIAGNOSIS — G43.719 INTRACTABLE CHRONIC MIGRAINE WITHOUT AURA AND WITHOUT STATUS MIGRAINOSUS: ICD-10-CM

## 2022-05-27 DIAGNOSIS — F33.2 MAJOR DEPRESSIVE DISORDER, RECURRENT SEVERE WITHOUT PSYCHOTIC FEATURES (H): ICD-10-CM

## 2022-05-27 LAB
ALBUMIN SERPL-MCNC: 3.7 G/DL (ref 3.4–5)
ALP SERPL-CCNC: 61 U/L (ref 40–150)
ALT SERPL W P-5'-P-CCNC: 20 U/L (ref 0–50)
ANION GAP SERPL CALCULATED.3IONS-SCNC: 5 MMOL/L (ref 3–14)
AST SERPL W P-5'-P-CCNC: 15 U/L (ref 0–45)
BASOPHILS # BLD AUTO: 0 10E3/UL (ref 0–0.2)
BASOPHILS NFR BLD AUTO: 0 %
BILIRUB SERPL-MCNC: 0.3 MG/DL (ref 0.2–1.3)
BUN SERPL-MCNC: 24 MG/DL (ref 7–30)
C TRACH DNA SPEC QL PROBE+SIG AMP: NEGATIVE
CALCIUM SERPL-MCNC: 8.8 MG/DL (ref 8.5–10.1)
CHLORIDE BLD-SCNC: 107 MMOL/L (ref 94–109)
CO2 SERPL-SCNC: 27 MMOL/L (ref 20–32)
CREAT SERPL-MCNC: 0.83 MG/DL (ref 0.52–1.04)
EOSINOPHIL # BLD AUTO: 0.4 10E3/UL (ref 0–0.7)
EOSINOPHIL NFR BLD AUTO: 4 %
ERYTHROCYTE [DISTWIDTH] IN BLOOD BY AUTOMATED COUNT: 11.6 % (ref 10–15)
FERRITIN SERPL-MCNC: 69 NG/ML (ref 12–150)
GFR SERPL CREATININE-BSD FRML MDRD: >90 ML/MIN/1.73M2
GLUCOSE BLD-MCNC: 96 MG/DL (ref 70–99)
HCG UR QL: NEGATIVE
HCT VFR BLD AUTO: 38.8 % (ref 35–47)
HGB BLD-MCNC: 13.4 G/DL (ref 11.7–15.7)
IRON SATN MFR SERPL: 23 % (ref 15–46)
IRON SERPL-MCNC: 96 UG/DL (ref 35–180)
LYMPHOCYTES # BLD AUTO: 3.1 10E3/UL (ref 0.8–5.3)
LYMPHOCYTES NFR BLD AUTO: 31 %
MAGNESIUM SERPL-MCNC: 1.6 MG/DL (ref 1.6–2.3)
MCH RBC QN AUTO: 31.5 PG (ref 26.5–33)
MCHC RBC AUTO-ENTMCNC: 34.5 G/DL (ref 31.5–36.5)
MCV RBC AUTO: 91 FL (ref 78–100)
MONOCYTES # BLD AUTO: 0.6 10E3/UL (ref 0–1.3)
MONOCYTES NFR BLD AUTO: 6 %
N GONORRHOEA DNA SPEC QL NAA+PROBE: NEGATIVE
NEUTROPHILS # BLD AUTO: 5.9 10E3/UL (ref 1.6–8.3)
NEUTROPHILS NFR BLD AUTO: 59 %
PLATELET # BLD AUTO: 276 10E3/UL (ref 150–450)
POTASSIUM BLD-SCNC: 3.9 MMOL/L (ref 3.4–5.3)
PROT SERPL-MCNC: 6.6 G/DL (ref 6.8–8.8)
RBC # BLD AUTO: 4.26 10E6/UL (ref 3.8–5.2)
SODIUM SERPL-SCNC: 139 MMOL/L (ref 133–144)
TIBC SERPL-MCNC: 420 UG/DL (ref 240–430)
TSH SERPL DL<=0.005 MIU/L-ACNC: 1.33 MU/L (ref 0.4–4)
WBC # BLD AUTO: 9.9 10E3/UL (ref 4–11)

## 2022-05-27 PROCEDURE — 36415 COLL VENOUS BLD VENIPUNCTURE: CPT | Mod: ZL | Performed by: NURSE PRACTITIONER

## 2022-05-27 PROCEDURE — 99214 OFFICE O/P EST MOD 30 MIN: CPT | Performed by: NURSE PRACTITIONER

## 2022-05-27 PROCEDURE — G0463 HOSPITAL OUTPT CLINIC VISIT: HCPCS

## 2022-05-27 PROCEDURE — 83550 IRON BINDING TEST: CPT | Mod: ZL | Performed by: NURSE PRACTITIONER

## 2022-05-27 PROCEDURE — 84443 ASSAY THYROID STIM HORMONE: CPT | Mod: ZL | Performed by: NURSE PRACTITIONER

## 2022-05-27 PROCEDURE — 85025 COMPLETE CBC W/AUTO DIFF WBC: CPT | Mod: ZL | Performed by: NURSE PRACTITIONER

## 2022-05-27 PROCEDURE — 82040 ASSAY OF SERUM ALBUMIN: CPT | Mod: ZL | Performed by: NURSE PRACTITIONER

## 2022-05-27 PROCEDURE — 81025 URINE PREGNANCY TEST: CPT | Mod: ZL | Performed by: NURSE PRACTITIONER

## 2022-05-27 PROCEDURE — 82306 VITAMIN D 25 HYDROXY: CPT | Mod: ZL | Performed by: NURSE PRACTITIONER

## 2022-05-27 PROCEDURE — 82728 ASSAY OF FERRITIN: CPT | Mod: ZL | Performed by: NURSE PRACTITIONER

## 2022-05-27 PROCEDURE — 80053 COMPREHEN METABOLIC PANEL: CPT | Mod: ZL | Performed by: NURSE PRACTITIONER

## 2022-05-27 PROCEDURE — 87491 CHLMYD TRACH DNA AMP PROBE: CPT | Mod: ZL | Performed by: NURSE PRACTITIONER

## 2022-05-27 PROCEDURE — 83735 ASSAY OF MAGNESIUM: CPT | Mod: ZL | Performed by: NURSE PRACTITIONER

## 2022-05-27 RX ORDER — DESOGESTREL AND ETHINYL ESTRADIOL 0.15-0.03
1 KIT ORAL DAILY
Qty: 84 TABLET | Refills: 1 | Status: SHIPPED | OUTPATIENT
Start: 2022-05-27 | End: 2022-09-01

## 2022-05-27 RX ORDER — DIAZEPAM 10 MG
10 TABLET ORAL DAILY PRN
Qty: 30 TABLET | Refills: 0 | Status: SHIPPED | OUTPATIENT
Start: 2022-05-27 | End: 2022-07-05

## 2022-05-27 RX ORDER — DILTIAZEM HYDROCHLORIDE 120 MG/1
CAPSULE, EXTENDED RELEASE ORAL
Qty: 90 CAPSULE | Refills: 1 | Status: SHIPPED | OUTPATIENT
Start: 2022-05-27 | End: 2022-07-05

## 2022-05-27 ASSESSMENT — ANXIETY QUESTIONNAIRES
GAD7 TOTAL SCORE: 14
GAD7 TOTAL SCORE: 14
7. FEELING AFRAID AS IF SOMETHING AWFUL MIGHT HAPPEN: NOT AT ALL
5. BEING SO RESTLESS THAT IT IS HARD TO SIT STILL: NEARLY EVERY DAY
3. WORRYING TOO MUCH ABOUT DIFFERENT THINGS: SEVERAL DAYS
1. FEELING NERVOUS, ANXIOUS, OR ON EDGE: NEARLY EVERY DAY
IF YOU CHECKED OFF ANY PROBLEMS ON THIS QUESTIONNAIRE, HOW DIFFICULT HAVE THESE PROBLEMS MADE IT FOR YOU TO DO YOUR WORK, TAKE CARE OF THINGS AT HOME, OR GET ALONG WITH OTHER PEOPLE: SOMEWHAT DIFFICULT
2. NOT BEING ABLE TO STOP OR CONTROL WORRYING: SEVERAL DAYS
6. BECOMING EASILY ANNOYED OR IRRITABLE: NEARLY EVERY DAY

## 2022-05-27 ASSESSMENT — PATIENT HEALTH QUESTIONNAIRE - PHQ9
SUM OF ALL RESPONSES TO PHQ QUESTIONS 1-9: 18
5. POOR APPETITE OR OVEREATING: NEARLY EVERY DAY

## 2022-05-27 ASSESSMENT — PAIN SCALES - GENERAL: PAINLEVEL: NO PAIN (0)

## 2022-05-27 NOTE — NURSING NOTE
"Chief Complaint   Patient presents with     RECHECK       Initial /78   Pulse 82   Temp 98.1  F (36.7  C)   Wt 72.1 kg (159 lb)   SpO2 99%   BMI 26.06 kg/m   Estimated body mass index is 26.06 kg/m  as calculated from the following:    Height as of 11/29/21: 1.664 m (5' 5.5\").    Weight as of this encounter: 72.1 kg (159 lb).  Medication Reconciliation: complete  Brandi Galindo LPN  "

## 2022-05-27 NOTE — COMMUNITY RESOURCES LIST (ENGLISH)
05/27/2022   [facility] - Outpatient Clinics  Brandi Galindo  For questions about this resource list or additional care needs, please contact your primary care clinic or care manager.  Phone: [phone]   Email: N/A   Address: [address] [place] [id number]   Hours: N/A        Mental Health       Individual counseling  1  [facility] [place] Distance: 0.83 miles      COVID-19 Status: Regular Operations   [address] [place] [address]  Language: English  Hours: Mon - Fri 8:00 AM - 4:30 PM  Fees: Insurance, Self Pay   Phone: [phone]) [phone] Email: [email or web address] Website: [email or web address]     2  [facility] [place] Distance: 1.09 miles      COVID-19 Status: Regular Operations   [address] [place] [address]  Language: English, Hmong, Mandarin, Grenadian  Hours: Thu 5:30 PM - 7:30 PM  Fees: Free   Phone: [phone] Email: [email or web address] Website: [email or web address]          Important Numbers & Websites       Emergency Services   911  [facility]   311  Poison Control   (460) 242-2196  Suicide Prevention Lifeline   (791) 820-9152 (TALK)  Child Abuse Hotline   (288) 368-8766 (4-A-Child)  Sexual Assault Hotline   (392) 710-4507 (HOPE)  National Runaway Safeline   (564) 369-8662 (RUNAWAY)  All-Options Talkline   (732) 444-7839  Substance Abuse Referral   (444) 516-1817 (HELP)

## 2022-05-27 NOTE — PROGRESS NOTES
Assessment & Plan     (L65.9) Hair loss  (primary encounter diagnosis)  Comment: check labs   Plan: CBC with platelets and differential,         Comprehensive metabolic panel (BMP + Alb, Alk         Phos, ALT, AST, Total. Bili, TP), Magnesium,         TSH with free T4 reflex, Vitamin D Deficiency,         Iron and iron binding capacity, Ferritin            (F41.0) Panic disorder without agoraphobia  Comment: RF  Plan: diazepam (VALIUM) 10 MG tablet            (G43.719) Intractable chronic migraine without aura and without status migrainosus  Comment: RF  Plan: diltiazem ER (DILT-XR) 120 MG 24 hr capsule            (F33.2) Major depressive disorder, recurrent severe without psychotic features (H)  Comment: RF  Plan: FLUoxetine (PROZAC) 20 MG capsule            (Z30.019) Encounter for female birth control  Comment: restart BCP. Urine HCG and GC/Chlamydia today   Plan: desogestrel-ethinyl estradiol (APRI) 0.15-30         MG-MCG tablet, HCG Qual, Urine ([id number]),         GC/Chlamydia by PCR - [facility]              See Patient Instructions    Return if symptoms worsen or fail to improve.    Nu Burger NP  [facility]   Valorie is a 27 year old who presents for the following health issues     HPI     Previous PCP Dr. Jo Muro retired. She is scheduled to establish care with Dr. Caballero 7-5-2022. She needs her medication filled to get her through until her appointment.     Concern - lab request  Onset: 11/2021  Description: low potassium and iron   Intensity: moderate  Progression of Symptoms:  worsening  Accompanying Signs & Symptoms: hair loss, dizziness, bruising   Previous history of similar problem: no  Precipitating factors:        Worsened by: NA  Alleviating factors:        Improved by: NA  Therapies tried and outcome:  none       Review of Systems   Constitutional, HEENT, cardiovascular, pulmonary, gi and gu systems are negative, except as otherwise noted.       Objective    /78   Pulse 82   Temp 98.1  F (36.7  C)   Wt 72.1 kg (159 lb)   SpO2 99%   BMI 26.06 kg/m    Body mass index is 26.06 kg/m .  Physical Exam   GENERAL: healthy, alert and no distress  HENT: ear canals and TM's normal, nose and mouth without ulcers or lesions. +thinning scalp hair    NECK: no adenopathy, no asymmetry, masses, or scars and thyroid normal to palpation  RESP: lungs clear to auscultation - no rales, rhonchi or wheezes  CV: regular rate and rhythm, normal S1 S2, no S3 or S4, no murmur, click or rub, no peripheral edema and peripheral pulses strong  MS: no gross musculoskeletal defects noted, no edema  SKIN: no suspicious lesions or rashes  NEURO: Normal strength and tone, mentation intact and speech normal  PSYCH: mentation appears normal, affect normal/bright

## 2022-05-31 LAB — DEPRECATED CALCIDIOL+CALCIFEROL SERPL-MC: 33 UG/L (ref 20–75)

## 2022-07-05 ENCOUNTER — OFFICE VISIT (OUTPATIENT)
Dept: FAMILY MEDICINE | Facility: OTHER | Age: 27
End: 2022-07-05
Attending: STUDENT IN AN ORGANIZED HEALTH CARE EDUCATION/TRAINING PROGRAM
Payer: COMMERCIAL

## 2022-07-05 VITALS
BODY MASS INDEX: 26.06 KG/M2 | SYSTOLIC BLOOD PRESSURE: 128 MMHG | TEMPERATURE: 97.9 F | OXYGEN SATURATION: 98 % | WEIGHT: 159 LBS | HEART RATE: 112 BPM | DIASTOLIC BLOOD PRESSURE: 82 MMHG

## 2022-07-05 DIAGNOSIS — M25.512 ACUTE PAIN OF BOTH SHOULDERS: ICD-10-CM

## 2022-07-05 DIAGNOSIS — Z30.019 ENCOUNTER FOR FEMALE BIRTH CONTROL: ICD-10-CM

## 2022-07-05 DIAGNOSIS — L65.9 HAIR LOSS: Primary | ICD-10-CM

## 2022-07-05 DIAGNOSIS — A60.04 HERPES SIMPLEX VULVOVAGINITIS: ICD-10-CM

## 2022-07-05 DIAGNOSIS — G43.719 INTRACTABLE CHRONIC MIGRAINE WITHOUT AURA AND WITHOUT STATUS MIGRAINOSUS: ICD-10-CM

## 2022-07-05 DIAGNOSIS — F41.8 MIXED ANXIETY AND DEPRESSIVE DISORDER: ICD-10-CM

## 2022-07-05 DIAGNOSIS — M25.511 ACUTE PAIN OF BOTH SHOULDERS: ICD-10-CM

## 2022-07-05 DIAGNOSIS — F41.0 PANIC DISORDER WITHOUT AGORAPHOBIA: ICD-10-CM

## 2022-07-05 PROCEDURE — G0463 HOSPITAL OUTPT CLINIC VISIT: HCPCS

## 2022-07-05 PROCEDURE — 99213 OFFICE O/P EST LOW 20 MIN: CPT | Performed by: STUDENT IN AN ORGANIZED HEALTH CARE EDUCATION/TRAINING PROGRAM

## 2022-07-05 RX ORDER — DESOGESTREL AND ETHINYL ESTRADIOL 0.15-0.03
1 KIT ORAL DAILY
Qty: 84 TABLET | Refills: 1 | Status: CANCELLED | OUTPATIENT
Start: 2022-07-05

## 2022-07-05 RX ORDER — DIAZEPAM 10 MG
10 TABLET ORAL DAILY PRN
Qty: 30 TABLET | Refills: 0 | Status: SHIPPED | OUTPATIENT
Start: 2022-07-05 | End: 2022-08-26

## 2022-07-05 RX ORDER — SUMATRIPTAN 100 MG/1
100 TABLET, FILM COATED ORAL
Qty: 9 TABLET | Refills: 3 | Status: SHIPPED | OUTPATIENT
Start: 2022-07-05 | End: 2022-09-27

## 2022-07-05 RX ORDER — DILTIAZEM HYDROCHLORIDE 120 MG/1
CAPSULE, EXTENDED RELEASE ORAL
Qty: 90 CAPSULE | Refills: 1 | Status: SHIPPED | OUTPATIENT
Start: 2022-07-05 | End: 2023-02-17

## 2022-07-05 RX ORDER — VALACYCLOVIR HYDROCHLORIDE 500 MG/1
500 TABLET, FILM COATED ORAL DAILY
Qty: 30 TABLET | Refills: 3 | Status: ON HOLD | OUTPATIENT
Start: 2022-07-05 | End: 2023-11-02

## 2022-07-05 RX ORDER — CYCLOBENZAPRINE HCL 10 MG
TABLET ORAL
Qty: 20 TABLET | Refills: 0 | Status: CANCELLED | OUTPATIENT
Start: 2022-07-05

## 2022-07-05 ASSESSMENT — ANXIETY QUESTIONNAIRES
2. NOT BEING ABLE TO STOP OR CONTROL WORRYING: NEARLY EVERY DAY
4. TROUBLE RELAXING: MORE THAN HALF THE DAYS
1. FEELING NERVOUS, ANXIOUS, OR ON EDGE: NEARLY EVERY DAY
3. WORRYING TOO MUCH ABOUT DIFFERENT THINGS: NEARLY EVERY DAY
7. FEELING AFRAID AS IF SOMETHING AWFUL MIGHT HAPPEN: SEVERAL DAYS
6. BECOMING EASILY ANNOYED OR IRRITABLE: NEARLY EVERY DAY
GAD7 TOTAL SCORE: 17
GAD7 TOTAL SCORE: 17
5. BEING SO RESTLESS THAT IT IS HARD TO SIT STILL: MORE THAN HALF THE DAYS
IF YOU CHECKED OFF ANY PROBLEMS ON THIS QUESTIONNAIRE, HOW DIFFICULT HAVE THESE PROBLEMS MADE IT FOR YOU TO DO YOUR WORK, TAKE CARE OF THINGS AT HOME, OR GET ALONG WITH OTHER PEOPLE: VERY DIFFICULT

## 2022-07-05 ASSESSMENT — PATIENT HEALTH QUESTIONNAIRE - PHQ9: SUM OF ALL RESPONSES TO PHQ QUESTIONS 1-9: 19

## 2022-07-05 ASSESSMENT — PAIN SCALES - GENERAL: PAINLEVEL: NO PAIN (0)

## 2022-07-05 NOTE — PROGRESS NOTES
Assessment & Plan     Hair loss  Ongoing diffuse thinning for several months.  First noted this 02/22 but it has gotten progressively worse over the last few months.  Describes 'broken hairs'.  Hair does not come out in clumps during washing or combing but she notes hair 'all over clothes'  Has history of irregular menses but no clinical signs of hyperandrogenism  Denies any recent severe stressors but NITZA indicates that anxiety is under poor control  Work-up so far has been unremarkable  No history of male pattern baldness  No recent medication changes  No excessive hair product use or processing  Hair pull test is negative and there are no scalp inflammatory changes which are apparent, there is no sign of infection  I find all of this reassuring and suspect telogen effluvium  Discussed importance of stress management  Discussed good hair hygiene as well as OTC multi-vitamins for hair growth that patient could benefit from taking  She also has a referral to dermatology but I'm not sure if there is anything else  that needs to be done      Mixed anxiety and depressive disorder  Not well controlled.  Patient scoring high on NITZA and PHQ.  She needs therapy concurrently with medications.    I think she would benefit from increasing her Prozac but will defer this to psychiatry      Panic disorder without agoraphobia  On Valium, will refill once.  Deferring to psychiatry for long term management.  Would recommend weaning off this medication to something safer long-term  - diazepam (VALIUM) 10 MG tablet; Take 1 tablet (10 mg) by mouth daily as needed for anxiety    Intractable chronic migraine without aura and without status migrainosus  Well controlled on current regimen  - diltiazem ER (DILT-XR) 120 MG 24 hr capsule; TAKE 1 CAPSULE(120 MG) BY MOUTH DAILY  - SUMAtriptan (IMITREX) 100 MG tablet; Take 1 tablet (100 mg) by mouth at onset of headache for migraine May repeat in 2 hours if needed: max 2/day; average number  of headaches monthly    Herpes simplex vulvovaginitis  Stable on regimen, will refill prophylaxis  - valACYclovir (VALTREX) 500 MG tablet; Take 1 tablet (500 mg) by mouth daily        No follow-ups on file.    Syeda Caballero MD  [facility] - [place] Eugene is a 27 year old, presenting for the following health issues:  No chief complaint on file.      HPI     Has been having hair loss.  Losing hair since February- noticed it has been a little thinner.  Last 3 months, has gotten way worse. Notices on hair brush.  Doesn't see hair coming out.  Doesn't notice clumps of hair when she showers.  Does not physically see lost hair.  Thinning more in the front.  Does not see it on brush.  No signs of hirsutism.  Looks like breakage.  No itching of scalp.    Not stressing out.  Eating consistently.  Weight is stable. Sleeping well.  Staying hydrated.  No history of hair loss in parents.  Receding hairline.  Hairline is way different.  Hair breaks easily.  Has not dyed her hair.  Washes hair every day and blow dries it.    Medical marijuana-  Jyoti Leahy.    Depression and Anxiety Follow-Up (Last visit 12/1/21 with Jyoti Leahy)    How are you doing with your depression since your last visit? Has been having a hard time    How are you doing with your anxiety since your last visit?  Has been feeling anxious and irritable    Are you having other symptoms that might be associated with depression or anxiety? Yes:  insomnia and over sleeping    Have you had a significant life event? No     Do you have any concerns with your use of alcohol or other drugs? No    Social History     Tobacco Use     Smoking status: Never Smoker     Smokeless tobacco: Never Used   Substance Use Topics     Alcohol use: Yes     Alcohol/week: 0.0 standard drinks     Comment: social     Drug use: No     PHQ 11/29/2021 12/1/2021 5/27/2022   PHQ-9 Total Score 16 17 18   Q9: Thoughts of better off dead/self-harm past 2 weeks  Not at all Not at all Not at all     NITZA-7 SCORE 12/1/2021 12/1/2021 5/27/2022   Total Score 15 15 14     Last PHQ-9 5/27/2022   1.  Little interest or pleasure in doing things 2   2.  Feeling down, depressed, or hopeless 1   3.  Trouble falling or staying asleep, or sleeping too much 3   4.  Feeling tired or having little energy 3   5.  Poor appetite or overeating 3   6.  Feeling bad about yourself 0   7.  Trouble concentrating 3   8.  Moving slowly or restless 3   Q9: Thoughts of better off dead/self-harm past 2 weeks 0   PHQ-9 Total Score 18   Difficulty at work, home, or with people Somewhat difficult     NITZA-7  7/5/2022   1. Feeling nervous, anxious, or on edge 3   2. Not being able to stop or control worrying 3   3. Worrying too much about different things 3   4. Trouble relaxing 2   5. Being so restless that it is hard to sit still 2   6. Becoming easily annoyed or irritable 3   7. Feeling afraid, as if something awful might happen 1   NITZA-7 Total Score 17   If you checked any problems, how difficult have they made it for you to do your work, take care of things at home, or get along with other people? Very difficult       Suicide Assessment Five-step Evaluation and Treatment (SAFE-T)      How many servings of fruits and vegetables do you eat daily?  2-3    On average, how many sweetened beverages do you drink each day (Examples: soda, juice, sweet tea, etc.  Do NOT count diet or artificially sweetened beverages)?   4    How many days per week do you exercise enough to make your heart beat faster? 3 or less    How many minutes a day do you exercise enough to make your heart beat faster? 9 or less    How many days per week do you miss taking your medication? 0      Review of Systems   Constitutional, HEENT, cardiovascular, pulmonary, gi and gu systems are negative, except as otherwise noted.      Objective    There were no vitals taken for this visit.  There is no height or weight on file to calculate  BMI.  Physical Exam   GENERAL: healthy, alert and no distress  EYES: Eyes grossly normal to inspection, PERRL and conjunctivae and sclerae normal  HENT: ear canals and TM's normal, nose and mouth without ulcers or lesions  NECK: no adenopathy, no asymmetry, masses, or scars and thyroid normal to palpation  RESP: lungs clear to auscultation - no rales, rhonchi or wheezes  CV: regular rate and rhythm, normal S1 S2, no S3 or S4, no murmur, click or rub, no peripheral edema and peripheral pulses strong  ABDOMEN: soft, nontender, no hepatosplenomegaly, no masses and bowel sounds normal  MS: no gross musculoskeletal defects noted, no edema  SKIN: no suspicious lesions or rashes, hair on head is diffusely thin, no scalp abnormalities noted.  Negative hair pull test  NEURO: Normal strength and tone, mentation intact and speech normal  PSYCH: mentation appears normal, affect normal/bright

## 2022-07-05 NOTE — COMMUNITY RESOURCES LIST (ENGLISH)
07/05/2022   [facility] - Outpatient Clinics  N/A  For questions about this resource list or additional care needs, please contact your primary care clinic or care manager.  Phone: [phone]   Email: N/A   Address: [address] [place] [address]   Hours: N/A        Mental Health       Individual counseling  1  [facility] [place] Distance: 0.83 miles      COVID-19 Status: Regular Operations   [address] [place] [address]  Language: English  Hours: Mon - Fri 8:00 AM - 4:30 PM  Fees: Insurance, Self Pay   Phone: [phone] Email: [email or web address] Website: [email or web address]     2  [facility] [place] Distance: 1.09 miles      COVID-19 Status: Regular Operations   [address] [place] [address]  Language: English, Hmong, Mandarin, Persian  Hours: Thu 5:30 PM - 7:30 PM  Fees: Free   Phone: [phone]) [phone] Email: [email or web address] Website: [email or web address]          Important Numbers & Websites       Emergency Services   911  [facility]   311  Poison Control   (252) 437-9028  Suicide Prevention Lifeline   (168) 383-5597 (TALK)  Child Abuse Hotline   (417) 747-8435 (4-A-Child)  Sexual Assault Hotline   (421) 581-5292 (HOPE)  National Runaway Safeline   (364) 984-4644 (RUNAWAY)  All-Options Talkline   (204) 508-5318  Substance Abuse Referral   (240) 978-1719 (HELP)

## 2022-07-05 NOTE — NURSING NOTE
"Chief Complaint   Patient presents with     Establish Care       Initial /82 (BP Location: Right arm, Patient Position: Sitting, Cuff Size: Adult Regular)   Pulse 112   Temp 97.9  F (36.6  C)   Wt 72.1 kg (159 lb)   SpO2 98%   BMI 26.06 kg/m   Estimated body mass index is 26.06 kg/m  as calculated from the following:    Height as of 11/29/21: 1.664 m (5' 5.5\").    Weight as of this encounter: 72.1 kg (159 lb).  Medication Reconciliation: complete  Jessa Behrman, LPN  "

## 2022-08-18 ENCOUNTER — TELEPHONE (OUTPATIENT)
Dept: FAMILY MEDICINE | Facility: OTHER | Age: 27
End: 2022-08-18

## 2022-08-18 NOTE — TELEPHONE ENCOUNTER
Pt dropped these forms off to be filled out by Dr Caballero & pt asked when they are done being filled out if she would fax them to the fax number on the paperwork.    Questions you can call pt @ [phone]    Thanks     Imelda

## 2022-08-24 ENCOUNTER — TELEPHONE (OUTPATIENT)
Dept: FAMILY MEDICINE | Facility: OTHER | Age: 27
End: 2022-08-24

## 2022-08-24 NOTE — TELEPHONE ENCOUNTER
To: Nurse to Dr. Caballero  Patient calling to check on the status of the medical opinion paperwork she dropped off.  Please call her to inform her of status @ [phone].  Thank you

## 2022-09-01 ENCOUNTER — MYC REFILL (OUTPATIENT)
Dept: FAMILY MEDICINE | Facility: OTHER | Age: 27
End: 2022-09-01

## 2022-09-01 DIAGNOSIS — Z30.019 ENCOUNTER FOR FEMALE BIRTH CONTROL: ICD-10-CM

## 2022-09-01 RX ORDER — DESOGESTREL AND ETHINYL ESTRADIOL 0.15-0.03
1 KIT ORAL DAILY
Qty: 84 TABLET | Refills: 1 | Status: SHIPPED | OUTPATIENT
Start: 2022-09-01 | End: 2023-04-10

## 2022-09-01 NOTE — TELEPHONE ENCOUNTER
Apri      Last Written Prescription Date:  5/27/22  Last Fill Quantity: 84,   # refills: 1  Last Office Visit: 7/5/22  Future Office visit:       Routing refill request to provider for review/approval because:

## 2022-09-04 ENCOUNTER — HEALTH MAINTENANCE LETTER (OUTPATIENT)
Age: 27
End: 2022-09-04

## 2022-09-07 ENCOUNTER — TELEPHONE (OUTPATIENT)
Dept: FAMILY MEDICINE | Facility: OTHER | Age: 27
End: 2022-09-07

## 2022-09-07 NOTE — TELEPHONE ENCOUNTER
Form received health evaluation & release ,placed in provider's wall bin.   After form is completed patient would like form to be filled out and faxed to [phone].

## 2022-09-27 ENCOUNTER — OFFICE VISIT (OUTPATIENT)
Dept: FAMILY MEDICINE | Facility: OTHER | Age: 27
End: 2022-09-27
Attending: STUDENT IN AN ORGANIZED HEALTH CARE EDUCATION/TRAINING PROGRAM
Payer: COMMERCIAL

## 2022-09-27 DIAGNOSIS — M25.511 CHRONIC PAIN OF BOTH SHOULDERS: Primary | ICD-10-CM

## 2022-09-27 DIAGNOSIS — F32.2 CURRENT SEVERE EPISODE OF MAJOR DEPRESSIVE DISORDER WITHOUT PSYCHOTIC FEATURES, UNSPECIFIED WHETHER RECURRENT (H): ICD-10-CM

## 2022-09-27 DIAGNOSIS — G89.29 CHRONIC PAIN OF BOTH SHOULDERS: Primary | ICD-10-CM

## 2022-09-27 DIAGNOSIS — M25.512 CHRONIC PAIN OF BOTH SHOULDERS: Primary | ICD-10-CM

## 2022-09-27 DIAGNOSIS — G43.719 INTRACTABLE CHRONIC MIGRAINE WITHOUT AURA AND WITHOUT STATUS MIGRAINOSUS: ICD-10-CM

## 2022-09-27 DIAGNOSIS — F33.42 RECURRENT MAJOR DEPRESSIVE DISORDER, IN FULL REMISSION (H): ICD-10-CM

## 2022-09-27 PROCEDURE — G0463 HOSPITAL OUTPT CLINIC VISIT: HCPCS

## 2022-09-27 PROCEDURE — 99214 OFFICE O/P EST MOD 30 MIN: CPT | Performed by: STUDENT IN AN ORGANIZED HEALTH CARE EDUCATION/TRAINING PROGRAM

## 2022-09-27 RX ORDER — FLUOXETINE 10 MG/1
10 CAPSULE ORAL DAILY
Qty: 30 CAPSULE | Refills: 1 | Status: SHIPPED | OUTPATIENT
Start: 2022-09-27 | End: 2023-01-12

## 2022-09-27 RX ORDER — SUMATRIPTAN 100 MG/1
100 TABLET, FILM COATED ORAL
Qty: 9 TABLET | Refills: 3 | Status: SHIPPED | OUTPATIENT
Start: 2022-09-27

## 2022-09-27 RX ORDER — NAPROXEN 500 MG/1
TABLET ORAL
Qty: 60 TABLET | Refills: 0 | Status: SHIPPED | OUTPATIENT
Start: 2022-09-27 | End: 2022-11-17

## 2022-09-27 NOTE — PROGRESS NOTES
Assessment & Plan     Intractable chronic migraine without aura and without status migrainosus  Will try Sumatriptan to control migraines  Will need to consider prophylaxis in the future  - SUMAtriptan (IMITREX) 100 MG tablet; Take 1 tablet (100 mg) by mouth at onset of headache for migraine May repeat in 2 hours if needed: max 2/day; average number of headaches monthly    Chronic pain of both shoulders  Has been well controlled with NSAIDs, refill today  - naproxen (NAPROSYN) 500 MG tablet; TAKE 1 TABLET(500 MG) BY MOUTH TWICE DAILY WITH MEALS    Current severe episode of major depressive disorder without psychotic features, unspecified whether recurrent (H)   Increase Prozac to 30 mg daily total  Follow-up in 4-6 weeks in clinic  - FLUoxetine (PROZAC) 10 MG capsule; Take 1 capsule (10 mg) by mouth daily with 20 mg in the morning    Return for Follow up.    Syeda Caballero MD  [facility] - [place] is a 27 year old here to have forms filled out  Forms      HPI         Patient is here to have Medical Opinion Forms completed.      For the last month, has been experiencing daily migraines.  Feel like her typical migraines.  At least 3x a week for the last month.  At night, headaches at night behind her eyes.  No other reported symptoms.  No nausea, no vomiting, no vision changes.  No weakness.    Headache is just behind the eyes.  Has tried tylenol and ibuprofen.  Nothing works.    Has to sleep it off.    Weather change is a big trigger for her- has been like that a lot lately.  Anxiety and depression getting worse in the last few weeks.  No ambition to do things.  Wants to sleep a lot.    Not working currently.   at [employer] previously.    Not having panic attacks lately.  No thoughts of hurting self or wanting to die or hurting anyone else.      Review of Systems   Constitutional, HEENT, cardiovascular, pulmonary, gi and gu systems are negative, except as  otherwise noted.      Objective    There were no vitals taken for this visit.  There is no height or weight on file to calculate BMI.  Physical Exam   GENERAL: healthy, alert and no distress  EYES: Eyes grossly normal to inspection, PERRL and conjunctivae and sclerae normal  PSYCH: mentation appears normal, affect normal/bright

## 2022-09-27 NOTE — PROGRESS NOTES
For the last month, has been experiencing daily migraines.  Feel like her typical migraines.  At least 3x a week for the last month.  At night, headaches at night behind her eyes.  No other reported symptoms.  No nausea, no vomiting, no vision changes.  No weakness.    Headache is just behind the eyes.  Has tried tylenol and ibuprofen.  Nothing works.    Has to sleep it off.    Weather change is a big trigger for her- has been like that a lot lately.  Anxiety and depression getting worse in the last few weeks.  No ambition to do things.  Wants to sleep a lot.      Not working currently.   at [employer] previously.      Not having panic attacks lately.  No thoughts of hurting self or wanting to die or hurting anyone else.

## 2022-09-27 NOTE — NURSING NOTE
"Chief Complaint   Patient presents with     Forms       Initial There were no vitals taken for this visit. Estimated body mass index is 26.06 kg/m  as calculated from the following:    Height as of 11/29/21: 1.664 m (5' 5.5\").    Weight as of 7/5/22: 72.1 kg (159 lb).  Medication Reconciliation: shweta Browning  "

## 2022-10-12 ENCOUNTER — TELEPHONE (OUTPATIENT)
Dept: PSYCHIATRY | Facility: OTHER | Age: 27
End: 2022-10-12

## 2022-10-12 NOTE — TELEPHONE ENCOUNTER
1:27 PM    Reason for Call: OVERBOOK    Patient is having the following symptoms: Needs medical opinion form filled out ASAP.  She states she has been trying to reach her PCP Dr Caballero since Oct 3rd with no response --- she says Dr Leahy is the one who normally fills it out for her but since she's been out of office she was trying to have her PCP complete it. Since she is having no luck with her PCP she is wondering if Dr Leahy is able to work her in for an appt to get the form completed ASAP     The patient is requesting an appointment for ASAP with Dr Leahy.    Was an appointment offered for this call? No -- appointments are booking into Nov     Preferred method for responding to this message: Telephone Call  What is your phone number ?[phone]    If we cannot reach you directly, may we leave a detailed response at the number you provided? Yes    Can this message wait until your PCP/provider returns, if unavailable today? Not applicable    Linh Lewis

## 2022-10-12 NOTE — TELEPHONE ENCOUNTER
Patient contacted and notified Medical Opinion form was placed on Dr. Montes's desk to be completed and faxed to [facility].  Patient is ok with this plan

## 2022-10-13 DIAGNOSIS — F41.0 PANIC DISORDER WITHOUT AGORAPHOBIA: ICD-10-CM

## 2022-10-17 NOTE — TELEPHONE ENCOUNTER
Valium      Last Written Prescription Date:  8.26.22  Last Fill Quantity: #25,   # refills: 0  Last Office Visit: 12.1.21  Future Office visit:       Routing refill request to provider for review/approval because:  Drug not on the [facility], P or [facility] refill protocol or controlled substance

## 2022-10-18 RX ORDER — DIAZEPAM 10 MG
TABLET ORAL
Qty: 25 TABLET | Refills: 0 | Status: SHIPPED | OUTPATIENT
Start: 2022-10-18 | End: 2022-11-29

## 2022-11-01 ENCOUNTER — APPOINTMENT (OUTPATIENT)
Dept: LAB | Facility: OTHER | Age: 27
End: 2022-11-01
Attending: STUDENT IN AN ORGANIZED HEALTH CARE EDUCATION/TRAINING PROGRAM
Payer: COMMERCIAL

## 2022-11-01 ENCOUNTER — OFFICE VISIT (OUTPATIENT)
Dept: FAMILY MEDICINE | Facility: OTHER | Age: 27
End: 2022-11-01
Attending: STUDENT IN AN ORGANIZED HEALTH CARE EDUCATION/TRAINING PROGRAM
Payer: COMMERCIAL

## 2022-11-01 VITALS
TEMPERATURE: 97.1 F | RESPIRATION RATE: 16 BRPM | WEIGHT: 175 LBS | HEART RATE: 91 BPM | OXYGEN SATURATION: 97 % | BODY MASS INDEX: 28.68 KG/M2 | DIASTOLIC BLOOD PRESSURE: 70 MMHG | SYSTOLIC BLOOD PRESSURE: 120 MMHG

## 2022-11-01 DIAGNOSIS — L65.9 HAIR LOSS: ICD-10-CM

## 2022-11-01 DIAGNOSIS — R31.0 GROSS HEMATURIA: ICD-10-CM

## 2022-11-01 DIAGNOSIS — N39.0 COMPLICATED UTI (URINARY TRACT INFECTION): Primary | ICD-10-CM

## 2022-11-01 DIAGNOSIS — R30.0 DYSURIA: ICD-10-CM

## 2022-11-01 PROBLEM — F33.42 RECURRENT MAJOR DEPRESSIVE DISORDER, IN FULL REMISSION (H): Chronic | Status: ACTIVE | Noted: 2018-10-18

## 2022-11-01 LAB
ALBUMIN UR-MCNC: 50 MG/DL
ANION GAP SERPL CALCULATED.3IONS-SCNC: 9 MMOL/L (ref 7–15)
APPEARANCE UR: ABNORMAL
BACTERIA #/AREA URNS HPF: ABNORMAL /HPF
BASOPHILS # BLD AUTO: 0.1 10E3/UL (ref 0–0.2)
BASOPHILS NFR BLD AUTO: 1 %
BILIRUB UR QL STRIP: NEGATIVE
BUN SERPL-MCNC: 22.7 MG/DL (ref 6–20)
CALCIUM SERPL-MCNC: 8.9 MG/DL (ref 8.6–10)
CHLORIDE SERPL-SCNC: 105 MMOL/L (ref 98–107)
COLOR UR AUTO: YELLOW
CREAT SERPL-MCNC: 0.73 MG/DL (ref 0.51–0.95)
DEPRECATED HCO3 PLAS-SCNC: 26 MMOL/L (ref 22–29)
EOSINOPHIL # BLD AUTO: 0.5 10E3/UL (ref 0–0.7)
EOSINOPHIL NFR BLD AUTO: 4 %
ERYTHROCYTE [DISTWIDTH] IN BLOOD BY AUTOMATED COUNT: 11.7 % (ref 10–15)
GFR SERPL CREATININE-BSD FRML MDRD: >90 ML/MIN/1.73M2
GLUCOSE SERPL-MCNC: 90 MG/DL (ref 70–99)
GLUCOSE UR STRIP-MCNC: NEGATIVE MG/DL
HCT VFR BLD AUTO: 38.5 % (ref 35–47)
HGB BLD-MCNC: 13.4 G/DL (ref 11.7–15.7)
HGB UR QL STRIP: ABNORMAL
IMM GRANULOCYTES # BLD: 0.1 10E3/UL
IMM GRANULOCYTES NFR BLD: 0 %
KETONES UR STRIP-MCNC: NEGATIVE MG/DL
LEUKOCYTE ESTERASE UR QL STRIP: ABNORMAL
LYMPHOCYTES # BLD AUTO: 2.5 10E3/UL (ref 0.8–5.3)
LYMPHOCYTES NFR BLD AUTO: 20 %
MCH RBC QN AUTO: 30.6 PG (ref 26.5–33)
MCHC RBC AUTO-ENTMCNC: 34.8 G/DL (ref 31.5–36.5)
MCV RBC AUTO: 88 FL (ref 78–100)
MONOCYTES # BLD AUTO: 0.7 10E3/UL (ref 0–1.3)
MONOCYTES NFR BLD AUTO: 6 %
MUCOUS THREADS #/AREA URNS LPF: PRESENT /LPF
NEUTROPHILS # BLD AUTO: 8.7 10E3/UL (ref 1.6–8.3)
NEUTROPHILS NFR BLD AUTO: 69 %
NITRATE UR QL: NEGATIVE
NRBC # BLD AUTO: 0 10E3/UL
NRBC BLD AUTO-RTO: 0 /100
PH UR STRIP: 6.5 [PH] (ref 4.7–8)
PLATELET # BLD AUTO: 345 10E3/UL (ref 150–450)
POTASSIUM SERPL-SCNC: 3.8 MMOL/L (ref 3.4–5.3)
RBC # BLD AUTO: 4.38 10E6/UL (ref 3.8–5.2)
RBC URINE: 61 /HPF
SODIUM SERPL-SCNC: 140 MMOL/L (ref 136–145)
SP GR UR STRIP: 1.02 (ref 1–1.03)
SQUAMOUS EPITHELIAL: 14 /HPF
UROBILINOGEN UR STRIP-MCNC: NORMAL MG/DL
WBC # BLD AUTO: 12.5 10E3/UL (ref 4–11)
WBC CLUMPS #/AREA URNS HPF: PRESENT /HPF
WBC URINE: >182 /HPF

## 2022-11-01 PROCEDURE — 36415 COLL VENOUS BLD VENIPUNCTURE: CPT | Mod: ZL | Performed by: STUDENT IN AN ORGANIZED HEALTH CARE EDUCATION/TRAINING PROGRAM

## 2022-11-01 PROCEDURE — 85025 COMPLETE CBC W/AUTO DIFF WBC: CPT | Mod: ZL | Performed by: STUDENT IN AN ORGANIZED HEALTH CARE EDUCATION/TRAINING PROGRAM

## 2022-11-01 PROCEDURE — G0463 HOSPITAL OUTPT CLINIC VISIT: HCPCS | Performed by: STUDENT IN AN ORGANIZED HEALTH CARE EDUCATION/TRAINING PROGRAM

## 2022-11-01 PROCEDURE — 87186 SC STD MICRODIL/AGAR DIL: CPT | Mod: ZL | Performed by: STUDENT IN AN ORGANIZED HEALTH CARE EDUCATION/TRAINING PROGRAM

## 2022-11-01 PROCEDURE — 81001 URINALYSIS AUTO W/SCOPE: CPT | Mod: ZL | Performed by: STUDENT IN AN ORGANIZED HEALTH CARE EDUCATION/TRAINING PROGRAM

## 2022-11-01 PROCEDURE — 82310 ASSAY OF CALCIUM: CPT | Mod: ZL | Performed by: STUDENT IN AN ORGANIZED HEALTH CARE EDUCATION/TRAINING PROGRAM

## 2022-11-01 PROCEDURE — 99213 OFFICE O/P EST LOW 20 MIN: CPT | Performed by: STUDENT IN AN ORGANIZED HEALTH CARE EDUCATION/TRAINING PROGRAM

## 2022-11-01 RX ORDER — CIPROFLOXACIN 500 MG/1
500 TABLET, FILM COATED ORAL 2 TIMES DAILY
Qty: 10 TABLET | Refills: 0 | Status: SHIPPED | OUTPATIENT
Start: 2022-11-01 | End: 2023-06-19

## 2022-11-01 ASSESSMENT — ANXIETY QUESTIONNAIRES
IF YOU CHECKED OFF ANY PROBLEMS ON THIS QUESTIONNAIRE, HOW DIFFICULT HAVE THESE PROBLEMS MADE IT FOR YOU TO DO YOUR WORK, TAKE CARE OF THINGS AT HOME, OR GET ALONG WITH OTHER PEOPLE: NOT DIFFICULT AT ALL
1. FEELING NERVOUS, ANXIOUS, OR ON EDGE: NOT AT ALL
GAD7 TOTAL SCORE: 6
6. BECOMING EASILY ANNOYED OR IRRITABLE: NEARLY EVERY DAY
2. NOT BEING ABLE TO STOP OR CONTROL WORRYING: NOT AT ALL
GAD7 TOTAL SCORE: 6
5. BEING SO RESTLESS THAT IT IS HARD TO SIT STILL: SEVERAL DAYS
7. FEELING AFRAID AS IF SOMETHING AWFUL MIGHT HAPPEN: NOT AT ALL
3. WORRYING TOO MUCH ABOUT DIFFERENT THINGS: NOT AT ALL
4. TROUBLE RELAXING: MORE THAN HALF THE DAYS

## 2022-11-01 ASSESSMENT — PATIENT HEALTH QUESTIONNAIRE - PHQ9: SUM OF ALL RESPONSES TO PHQ QUESTIONS 1-9: 14

## 2022-11-01 ASSESSMENT — PAIN SCALES - GENERAL: PAINLEVEL: SEVERE PAIN (6)

## 2022-11-01 NOTE — NURSING NOTE
"Chief Complaint   Patient presents with     Kidney Problem       Initial /70   Pulse 91   Temp 97.1  F (36.2  C) (Tympanic)   Resp 16   Wt 79.4 kg (175 lb)   SpO2 97%   BMI 28.68 kg/m   Estimated body mass index is 28.68 kg/m  as calculated from the following:    Height as of 11/29/21: 1.664 m (5' 5.5\").    Weight as of this encounter: 79.4 kg (175 lb).  Medication Reconciliation: complete  Yadi Brock LPN    "

## 2022-11-01 NOTE — PROGRESS NOTES
Assessment & Plan     Complicated UTI  Dysuria  Gross hematuria  Suspect acute cystitis as cause of symptoms, although is complicated UTI with flank pain. Still hydrating well, with no nausea, emesis or fever.  Has history of renal stones, however pain does not sound like renal colic, and it would be unusual to be bilateral.  No other likely abdominal etiology based on history or exam.  Will update CBC and renal function, will obtain imaging if concerning creatinine.  Has tolerated Cipro in the past, will start today. Recommend yogurt/probiotic. Will follow culture.  Planning repeat UA in 1 month once asymptomatic to ensure resolution of hematuria. Concerning signs and symptoms reviewed that would indicate need for urgent re-evaluation. Patient stated understanding and agreement with the plan.   - ciprofloxacin (CIPRO) 500 MG tablet; Take 1 tablet (500 mg) by mouth 2 times daily for 5 days  - UA with Microscopic reflex to Culture - [facility]; Future  - Basic metabolic panel  - CBC with platelets and differential    Hair loss  6 months of diffuse thinning of her hair.  Laboratory work-up with TSH, ferritin, iron, CBC, vitamin D, magnesium and CMP unremarkable.  She would like to see dermatology which is reasonable, as I do not see a clear etiology other than telogen effluvium w/o overt cause.   - Adult Dermatology Referral; Future       Return if symptoms worsen or fail to improve.    Nasrin Egan MD  [facility] - [facility] Eugene is a 27 year old, presenting for the following health issues:  Kidney Problem      HPI     Genitourinary - Female  Onset/Duration: 4 days ago  - Saturday   Started when she woke up with bilateral low back pain   Description:   Painful urination (Dysuria): YES - started Saturday, has lessened           Frequency: YES, desire to go   Blood in urine (Hematuria): YES - Saturday & Sunday, no clots, none since then; didn't see a stone pass  Delay in urine  (Hesitency): YES  Intensity: moderate  Progression of Symptoms:  Better from Saturday   Accompanying Signs & Symptoms:  Fever/chills: fever on Sunday - felt warm - no temperature checked  Flank pain: YES - both sides  Nausea and vomiting: No  Vaginal symptoms: none, a little discharge   Abdominal/Pelvic Pain: No  History:   History of frequent UTI s: YES- a long time ago ( several years)  - had an epsiode of stone in June 2021  History of kidney stones: YES  Sexually Active: YES  Possibility of pregnancy: No  Precipitating or alleviating factors: None  Therapies tried and outcome: cranberry pills, increase fluids and Pyridium - some benefit; some naproxen, helps.      Pain has stayed in bilateral low back, no radiation  There all the time, doesn't come and go   Worse with laying down at night   Not coming in waves     Last menses 2.5 weeks ago; on Apri     Also wondering about a referral for hair loss.  Has seen other providers, had labs done.  Normal iron, ferritin, TSH, CBC, CMP.  Continues to lose hair diffusely, not in 1 spot.  There was not a stressful event 3 months prior to hair loss.  Continues to worsen over 6 months.  Does not feel like it is improving or or stopping shedding.      Objective    /70   Pulse 91   Temp 97.1  F (36.2  C) (Tympanic)   Resp 16   Wt 79.4 kg (175 lb)   SpO2 97%   BMI 28.68 kg/m    Body mass index is 28.68 kg/m .     Physical Exam  Constitutional:       General: She is not in acute distress.     Appearance: Normal appearance. She is not ill-appearing, toxic-appearing or diaphoretic.   Eyes:      Conjunctiva/sclera: Conjunctivae normal.   Cardiovascular:      Rate and Rhythm: Normal rate and regular rhythm.      Heart sounds: No murmur heard.  Pulmonary:      Effort: Pulmonary effort is normal.      Breath sounds: Normal breath sounds.   Abdominal:      Palpations: Abdomen is soft. There is no mass.      Tenderness: There is no abdominal tenderness. There is right CVA  tenderness and left CVA tenderness. There is no guarding or rebound.   Musculoskeletal:      Right lower leg: No edema.      Left lower leg: No edema.   Skin:     General: Skin is warm and dry.      Capillary Refill: Capillary refill takes less than 2 seconds.   Neurological:      General: No focal deficit present.      Mental Status: She is alert and oriented to person, place, and time.   Psychiatric:         Mood and Affect: Mood normal.         Behavior: Behavior normal.        Results for orders placed or performed in visit on 11/01/22   UA with Microscopic reflex to Culture - [place]     Status: Abnormal    Specimen: Urine, Clean Catch   Result Value Ref Range    Color Urine Yellow Colorless, Straw, Light Yellow, Yellow    Appearance Urine Cloudy (A) Clear    Glucose Urine Negative Negative mg/dL    Bilirubin Urine Negative Negative    Ketones Urine Negative Negative mg/dL    Specific Gravity Urine 1.024 1.003 - 1.035    Blood Urine Large (A) Negative    pH Urine 6.5 4.7 - 8.0    Protein Albumin Urine 50 (A) Negative mg/dL    Urobilinogen Urine Normal Normal, 2.0 mg/dL    Nitrite Urine Negative Negative    Leukocyte Esterase Urine Large (A) Negative    Bacteria Urine Few (A) None Seen /HPF    WBC Clumps Urine Present (A) None Seen /HPF    Mucus Urine Present (A) None Seen /LPF    RBC Urine 61 (H) <=2 /HPF    WBC Urine >182 (H) <=5 /HPF    Squamous Epithelials Urine 14 (H) <=1 /HPF   CBC with platelets and differential     Status: None (In process)    Narrative    The following orders were created for panel order CBC with platelets and differential.  Procedure                               Abnormality         Status                     ---------                               -----------         ------                     CBC with platelets and d...[[id number]]                      In process                   Please view results for these tests on the individual orders.

## 2022-11-03 LAB — BACTERIA UR CULT: ABNORMAL

## 2022-11-17 ENCOUNTER — MYC REFILL (OUTPATIENT)
Dept: FAMILY MEDICINE | Facility: OTHER | Age: 27
End: 2022-11-17

## 2022-11-17 DIAGNOSIS — G89.29 CHRONIC PAIN OF BOTH SHOULDERS: ICD-10-CM

## 2022-11-17 DIAGNOSIS — M25.512 CHRONIC PAIN OF BOTH SHOULDERS: ICD-10-CM

## 2022-11-17 DIAGNOSIS — M25.511 CHRONIC PAIN OF BOTH SHOULDERS: ICD-10-CM

## 2022-11-17 RX ORDER — NAPROXEN 500 MG/1
TABLET ORAL
Qty: 60 TABLET | Refills: 0 | Status: SHIPPED | OUTPATIENT
Start: 2022-11-17 | End: 2023-03-15

## 2022-11-17 NOTE — TELEPHONE ENCOUNTER
naproxen      Last Written Prescription Date:  9/27/22  Last Fill Quantity: 60,   # refills: 0  Last Office Visit: 11/14/22  Future Office visit:       Routing refill request to provider for review/approval because:

## 2022-11-29 ENCOUNTER — MYC REFILL (OUTPATIENT)
Dept: FAMILY MEDICINE | Facility: OTHER | Age: 27
End: 2022-11-29

## 2022-11-29 DIAGNOSIS — F41.0 PANIC DISORDER WITHOUT AGORAPHOBIA: ICD-10-CM

## 2022-11-29 DIAGNOSIS — F33.2 MAJOR DEPRESSIVE DISORDER, RECURRENT SEVERE WITHOUT PSYCHOTIC FEATURES (H): ICD-10-CM

## 2022-11-30 RX ORDER — DIAZEPAM 10 MG
10 TABLET ORAL DAILY PRN
Qty: 25 TABLET | Refills: 0 | Status: SHIPPED | OUTPATIENT
Start: 2022-11-30 | End: 2023-01-12

## 2022-11-30 NOTE — TELEPHONE ENCOUNTER
diazepam      Last Written Prescription Date:  10/18/22  Last Fill Quantity: 25,   # refills: 0  Last Office Visit: 11/1/22  Future Office visit:       Routing refill request to provider for review/approval because:

## 2022-12-01 NOTE — TELEPHONE ENCOUNTER
Prozac      Last Written Prescription Date:  9.12.22  Last Fill Quantity: #90,   # refills: 0  Last Office Visit: 9.27.22  Future Office visit:       Routing refill request to provider for review/approval because:

## 2022-12-27 NOTE — LETTER
"Shakila Puckett  Your attached labs are normal. Keep up the good work!  Please contact the office with any questions or concerns.    Latesha Yates \"He\" MITCHELL May    " September 18, 2020      TO: Whom It May Concern       I work with Valorie Awad in my psychiatry clinic at [facility]. Valorie struggles with symptoms of PTSD as part of her mental health issues. If you have any questions and / or concerns I can be reached at [phone] and fax is [phone].      Sincerely,      Jyoti Leahy MD

## 2023-01-09 DIAGNOSIS — F41.0 PANIC DISORDER WITHOUT AGORAPHOBIA: ICD-10-CM

## 2023-01-09 DIAGNOSIS — F32.2 CURRENT SEVERE EPISODE OF MAJOR DEPRESSIVE DISORDER WITHOUT PSYCHOTIC FEATURES, UNSPECIFIED WHETHER RECURRENT (H): ICD-10-CM

## 2023-01-12 RX ORDER — FLUOXETINE 10 MG/1
CAPSULE ORAL
Qty: 30 CAPSULE | Refills: 0 | Status: SHIPPED | OUTPATIENT
Start: 2023-01-12 | End: 2023-02-17

## 2023-01-12 RX ORDER — DIAZEPAM 10 MG
TABLET ORAL
Qty: 25 TABLET | Refills: 0 | Status: SHIPPED | OUTPATIENT
Start: 2023-01-12 | End: 2023-04-10

## 2023-01-12 NOTE — TELEPHONE ENCOUNTER
diazepam (VALIUM) 10 MG tablet 25 tablet 0 11/30/2022     FLUoxetine (PROZAC) 10 MG capsule 30 capsule 1 9/27/2022     Last Office Visit: 9.27.22

## 2023-01-15 ENCOUNTER — HEALTH MAINTENANCE LETTER (OUTPATIENT)
Age: 28
End: 2023-01-15

## 2023-02-16 DIAGNOSIS — G43.719 INTRACTABLE CHRONIC MIGRAINE WITHOUT AURA AND WITHOUT STATUS MIGRAINOSUS: ICD-10-CM

## 2023-02-16 DIAGNOSIS — F32.2 CURRENT SEVERE EPISODE OF MAJOR DEPRESSIVE DISORDER WITHOUT PSYCHOTIC FEATURES, UNSPECIFIED WHETHER RECURRENT (H): ICD-10-CM

## 2023-02-17 RX ORDER — FLUOXETINE 10 MG/1
CAPSULE ORAL
Qty: 30 CAPSULE | Refills: 0 | Status: SHIPPED | OUTPATIENT
Start: 2023-02-17 | End: 2023-02-28

## 2023-02-17 RX ORDER — DILTIAZEM HYDROCHLORIDE 120 MG/1
CAPSULE, EXTENDED RELEASE ORAL
Qty: 30 CAPSULE | Refills: 0 | Status: SHIPPED | OUTPATIENT
Start: 2023-02-17 | End: 2023-03-24

## 2023-02-17 NOTE — TELEPHONE ENCOUNTER
Diltiazem 120mg      Last Written Prescription Date:  7/5/22  Last Fill Quantity: 90,   # refills: 1  Last Office Visit: 11/14/22  Future Office visit:       Routing refill request to provider for review/approval because:      FLUoxetine 10mg      Last Written Prescription Date:  1/12/23  Last Fill Quantity: 30,   # refills: 0  Last Office Visit: 11/14/22  Future Office visit:       Routing refill request to provider for review/approval because:

## 2023-02-28 ENCOUNTER — MYC MEDICAL ADVICE (OUTPATIENT)
Dept: FAMILY MEDICINE | Facility: OTHER | Age: 28
End: 2023-02-28

## 2023-02-28 ENCOUNTER — MYC REFILL (OUTPATIENT)
Dept: FAMILY MEDICINE | Facility: OTHER | Age: 28
End: 2023-02-28

## 2023-02-28 DIAGNOSIS — F32.2 CURRENT SEVERE EPISODE OF MAJOR DEPRESSIVE DISORDER WITHOUT PSYCHOTIC FEATURES, UNSPECIFIED WHETHER RECURRENT (H): ICD-10-CM

## 2023-02-28 ASSESSMENT — ANXIETY QUESTIONNAIRES
5. BEING SO RESTLESS THAT IT IS HARD TO SIT STILL: MORE THAN HALF THE DAYS
6. BECOMING EASILY ANNOYED OR IRRITABLE: NEARLY EVERY DAY
3. WORRYING TOO MUCH ABOUT DIFFERENT THINGS: MORE THAN HALF THE DAYS
7. FEELING AFRAID AS IF SOMETHING AWFUL MIGHT HAPPEN: NOT AT ALL
8. IF YOU CHECKED OFF ANY PROBLEMS, HOW DIFFICULT HAVE THESE MADE IT FOR YOU TO DO YOUR WORK, TAKE CARE OF THINGS AT HOME, OR GET ALONG WITH OTHER PEOPLE?: SOMEWHAT DIFFICULT
GAD7 TOTAL SCORE: 13
1. FEELING NERVOUS, ANXIOUS, OR ON EDGE: MORE THAN HALF THE DAYS
GAD7 TOTAL SCORE: 13
IF YOU CHECKED OFF ANY PROBLEMS ON THIS QUESTIONNAIRE, HOW DIFFICULT HAVE THESE PROBLEMS MADE IT FOR YOU TO DO YOUR WORK, TAKE CARE OF THINGS AT HOME, OR GET ALONG WITH OTHER PEOPLE: SOMEWHAT DIFFICULT
2. NOT BEING ABLE TO STOP OR CONTROL WORRYING: SEVERAL DAYS
4. TROUBLE RELAXING: NEARLY EVERY DAY
GAD7 TOTAL SCORE: 13
7. FEELING AFRAID AS IF SOMETHING AWFUL MIGHT HAPPEN: NOT AT ALL

## 2023-02-28 ASSESSMENT — PATIENT HEALTH QUESTIONNAIRE - PHQ9
SUM OF ALL RESPONSES TO PHQ QUESTIONS 1-9: 12
10. IF YOU CHECKED OFF ANY PROBLEMS, HOW DIFFICULT HAVE THESE PROBLEMS MADE IT FOR YOU TO DO YOUR WORK, TAKE CARE OF THINGS AT HOME, OR GET ALONG WITH OTHER PEOPLE: SOMEWHAT DIFFICULT
SUM OF ALL RESPONSES TO PHQ QUESTIONS 1-9: 12

## 2023-03-03 NOTE — TELEPHONE ENCOUNTER
FLUoxetine (PROZAC) 10 MG capsule      Last Written Prescription Date:  2/17/2023  Last Fill Quantity: 30,   # refills: 0  Last Office Visit: 7/05/2022

## 2023-03-03 NOTE — TELEPHONE ENCOUNTER
[facility] for Depression Remission quality patient outreach per [facility] guidelines. Noted slight decrease in total PHQ-9 score & increase in NITZA-7 total score when compared to last completed assessments done on 11/1/2022. Will outreach with patient to see if she's interested in scheduling mental health hamida't with PCP Caballero to discuss mental health status further. This writer will provide [facility] contact info as well as mental health resources.     PHQ 7/5/2022 11/1/2022 2/28/2023   PHQ-9 Total Score 19 14 12   Q9: Thoughts of better off dead/self-harm past 2 weeks Not at all Not at all Not at all     NITZA-7 SCORE 7/5/2022 11/1/2022 2/28/2023   Total Score - - 13 (moderate anxiety)   Total Score 17 6 13     Lin Ramirez, RN

## 2023-03-06 ENCOUNTER — MYC REFILL (OUTPATIENT)
Dept: FAMILY MEDICINE | Facility: OTHER | Age: 28
End: 2023-03-06

## 2023-03-06 DIAGNOSIS — F33.2 MAJOR DEPRESSIVE DISORDER, RECURRENT SEVERE WITHOUT PSYCHOTIC FEATURES (H): ICD-10-CM

## 2023-03-06 RX ORDER — FLUOXETINE 10 MG/1
CAPSULE ORAL
Qty: 30 CAPSULE | Refills: 0 | Status: SHIPPED | OUTPATIENT
Start: 2023-03-06 | End: 2023-04-10

## 2023-03-08 NOTE — TELEPHONE ENCOUNTER
Prozac      Last Written Prescription Date:  12/01/22  Last Fill Quantity: 90,   # refills: 0  Last Office Visit: 11/14/22  Future Office visit:

## 2023-03-13 DIAGNOSIS — G89.29 CHRONIC PAIN OF BOTH SHOULDERS: ICD-10-CM

## 2023-03-13 DIAGNOSIS — M25.511 CHRONIC PAIN OF BOTH SHOULDERS: ICD-10-CM

## 2023-03-13 DIAGNOSIS — M25.512 CHRONIC PAIN OF BOTH SHOULDERS: ICD-10-CM

## 2023-03-15 RX ORDER — NAPROXEN 500 MG/1
TABLET ORAL
Qty: 60 TABLET | Refills: 0 | Status: SHIPPED | OUTPATIENT
Start: 2023-03-15 | End: 2023-08-02

## 2023-03-15 NOTE — TELEPHONE ENCOUNTER
Naproxen      Last Written Prescription Date:  11.17.22  Last Fill Quantity: #60,   # refills: 0  Last Office Visit: 9.27.22  Future Office visit:       Routing refill request to provider for review/approval because:

## 2023-03-24 DIAGNOSIS — G43.719 INTRACTABLE CHRONIC MIGRAINE WITHOUT AURA AND WITHOUT STATUS MIGRAINOSUS: ICD-10-CM

## 2023-03-24 RX ORDER — DILTIAZEM HYDROCHLORIDE 120 MG/1
CAPSULE, EXTENDED RELEASE ORAL
Qty: 30 CAPSULE | Refills: 0 | Status: SHIPPED | OUTPATIENT
Start: 2023-03-24 | End: 2023-08-07

## 2023-04-06 DIAGNOSIS — Z30.019 ENCOUNTER FOR FEMALE BIRTH CONTROL: ICD-10-CM

## 2023-04-06 DIAGNOSIS — F41.0 PANIC DISORDER WITHOUT AGORAPHOBIA: ICD-10-CM

## 2023-04-06 DIAGNOSIS — F32.2 CURRENT SEVERE EPISODE OF MAJOR DEPRESSIVE DISORDER WITHOUT PSYCHOTIC FEATURES, UNSPECIFIED WHETHER RECURRENT (H): ICD-10-CM

## 2023-04-10 RX ORDER — DIAZEPAM 10 MG
TABLET ORAL
Qty: 25 TABLET | Refills: 0 | Status: SHIPPED | OUTPATIENT
Start: 2023-04-10 | End: 2023-06-16

## 2023-04-10 RX ORDER — DESOGESTREL AND ETHINYL ESTRADIOL 0.15-0.03
KIT ORAL
Qty: 84 TABLET | Refills: 0 | Status: SHIPPED | OUTPATIENT
Start: 2023-04-10 | End: 2023-06-27

## 2023-04-10 RX ORDER — FLUOXETINE 10 MG/1
CAPSULE ORAL
Qty: 30 CAPSULE | Refills: 0 | Status: SHIPPED | OUTPATIENT
Start: 2023-04-10 | End: 2023-05-10

## 2023-04-10 NOTE — TELEPHONE ENCOUNTER
ISIBLOOM 0.15-30 MG-MCG tablet (Discontinued)      Last Written Prescription Date:  8/02/2021  Last Fill Quantity: 0,   # refills: 0  Last Office Visit: 11/14/2022    diazepam (VALIUM) 10 MG tablet      Last Written Prescription Date:  1/12/2023  Last Fill Quantity: 25,   # refills: 0    FLUoxetine (PROZAC) 10 MG capsule      Last Written Prescription Date:  3/06/2023  Last Fill Quantity: 30,   # refills: 0

## 2023-04-12 ENCOUNTER — OFFICE VISIT (OUTPATIENT)
Dept: FAMILY MEDICINE | Facility: OTHER | Age: 28
End: 2023-04-12
Attending: NURSE PRACTITIONER

## 2023-04-12 VITALS
SYSTOLIC BLOOD PRESSURE: 120 MMHG | BODY MASS INDEX: 33.2 KG/M2 | TEMPERATURE: 99 F | WEIGHT: 202.6 LBS | OXYGEN SATURATION: 97 % | DIASTOLIC BLOOD PRESSURE: 74 MMHG | HEART RATE: 104 BPM
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
## 2023-04-12 PROCEDURE — 99213 OFFICE O/P EST LOW 20 MIN: CPT | Performed by: NURSE PRACTITIONER

## 2023-04-12 RX ORDER — CYCLOBENZAPRINE HCL 10 MG
10 TABLET ORAL 3 TIMES DAILY PRN
Qty: 30 TABLET | Refills: 1 | Status: SHIPPED | OUTPATIENT
Start: 2023-04-12

## 2023-04-12 RX ORDER — DIAZEPAM 10 MG
10 TABLET ORAL DAILY PRN
Qty: 25 TABLET | Refills: 0 | Status: CANCELLED | OUTPATIENT
Start: 2023-04-12

## 2023-04-12 RX ORDER — DESOGESTREL AND ETHINYL ESTRADIOL 0.15-0.03
1 KIT ORAL DAILY
Qty: 84 TABLET | Refills: 0 | Status: CANCELLED | OUTPATIENT
Start: 2023-04-12

## 2023-04-12 RX ORDER — KETOROLAC TROMETHAMINE 10 MG/1
10 TABLET, FILM COATED ORAL EVERY 6 HOURS PRN
Qty: 20 TABLET | Refills: 0 | Status: SHIPPED | OUTPATIENT
Start: 2023-04-12 | End: 2023-08-02

## 2023-04-12 ASSESSMENT — ANXIETY QUESTIONNAIRES
4. TROUBLE RELAXING: MORE THAN HALF THE DAYS
5. BEING SO RESTLESS THAT IT IS HARD TO SIT STILL: MORE THAN HALF THE DAYS
GAD7 TOTAL SCORE: 8
3. WORRYING TOO MUCH ABOUT DIFFERENT THINGS: NOT AT ALL
1. FEELING NERVOUS, ANXIOUS, OR ON EDGE: SEVERAL DAYS
GAD7 TOTAL SCORE: 8
7. FEELING AFRAID AS IF SOMETHING AWFUL MIGHT HAPPEN: NOT AT ALL
6. BECOMING EASILY ANNOYED OR IRRITABLE: NEARLY EVERY DAY
2. NOT BEING ABLE TO STOP OR CONTROL WORRYING: NOT AT ALL
IF YOU CHECKED OFF ANY PROBLEMS ON THIS QUESTIONNAIRE, HOW DIFFICULT HAVE THESE PROBLEMS MADE IT FOR YOU TO DO YOUR WORK, TAKE CARE OF THINGS AT HOME, OR GET ALONG WITH OTHER PEOPLE: SOMEWHAT DIFFICULT

## 2023-04-12 ASSESSMENT — PATIENT HEALTH QUESTIONNAIRE - PHQ9: SUM OF ALL RESPONSES TO PHQ QUESTIONS 1-9: 9

## 2023-04-12 ASSESSMENT — PAIN SCALES - GENERAL: PAINLEVEL: SEVERE PAIN (7)
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
## 2023-05-09 DIAGNOSIS — F32.2 CURRENT SEVERE EPISODE OF MAJOR DEPRESSIVE DISORDER WITHOUT PSYCHOTIC FEATURES, UNSPECIFIED WHETHER RECURRENT (H): ICD-10-CM

## 2023-05-10 RX ORDER — FLUOXETINE 10 MG/1
CAPSULE ORAL
Qty: 30 CAPSULE | Refills: 0 | Status: SHIPPED | OUTPATIENT
Start: 2023-05-10 | End: 2023-06-13

## 2023-05-10 NOTE — TELEPHONE ENCOUNTER
FLUoxetine (PROZAC) 10 MG capsule    Last Written Prescription Date:  4-10-23  Last Fill Quantity: 30,   # refills: 0  Last Office Visit: 4-12-23  Future Office visit:       Routing refill request to provider for review/approval because:  Drug not on the [facility], P or [facility] refill protocol or controlled substance

## 2023-06-12 DIAGNOSIS — F33.2 MAJOR DEPRESSIVE DISORDER, RECURRENT SEVERE WITHOUT PSYCHOTIC FEATURES (H): ICD-10-CM

## 2023-06-12 DIAGNOSIS — F32.2 CURRENT SEVERE EPISODE OF MAJOR DEPRESSIVE DISORDER WITHOUT PSYCHOTIC FEATURES, UNSPECIFIED WHETHER RECURRENT (H): ICD-10-CM

## 2023-06-13 NOTE — TELEPHONE ENCOUNTER
FLUoxetine (PROZAC) 10 MG capsule 30 capsule 0 5/10/2023     FLUoxetine (PROZAC) 20 MG capsule 90 capsule 0 3/9/2023      Last Office Visit: 4-12-23  Future Office visit:       Routing refill request to provider for review/approval because:

## 2023-06-14 DIAGNOSIS — F41.0 PANIC DISORDER WITHOUT AGORAPHOBIA: ICD-10-CM

## 2023-06-15 ENCOUNTER — MYC MEDICAL ADVICE (OUTPATIENT)
Dept: FAMILY MEDICINE | Facility: OTHER | Age: 28
End: 2023-06-15

## 2023-06-15 RX ORDER — FLUOXETINE 10 MG/1
CAPSULE ORAL
Qty: 90 CAPSULE | Refills: 0 | Status: SHIPPED | OUTPATIENT
Start: 2023-06-15 | End: 2023-08-07

## 2023-06-15 ASSESSMENT — PATIENT HEALTH QUESTIONNAIRE - PHQ9
SUM OF ALL RESPONSES TO PHQ QUESTIONS 1-9: 9
10. IF YOU CHECKED OFF ANY PROBLEMS, HOW DIFFICULT HAVE THESE PROBLEMS MADE IT FOR YOU TO DO YOUR WORK, TAKE CARE OF THINGS AT HOME, OR GET ALONG WITH OTHER PEOPLE: SOMEWHAT DIFFICULT
SUM OF ALL RESPONSES TO PHQ QUESTIONS 1-9: 9

## 2023-06-15 ASSESSMENT — ANXIETY QUESTIONNAIRES
IF YOU CHECKED OFF ANY PROBLEMS ON THIS QUESTIONNAIRE, HOW DIFFICULT HAVE THESE PROBLEMS MADE IT FOR YOU TO DO YOUR WORK, TAKE CARE OF THINGS AT HOME, OR GET ALONG WITH OTHER PEOPLE: SOMEWHAT DIFFICULT
6. BECOMING EASILY ANNOYED OR IRRITABLE: NEARLY EVERY DAY
3. WORRYING TOO MUCH ABOUT DIFFERENT THINGS: NOT AT ALL
8. IF YOU CHECKED OFF ANY PROBLEMS, HOW DIFFICULT HAVE THESE MADE IT FOR YOU TO DO YOUR WORK, TAKE CARE OF THINGS AT HOME, OR GET ALONG WITH OTHER PEOPLE?: SOMEWHAT DIFFICULT
GAD7 TOTAL SCORE: 8
5. BEING SO RESTLESS THAT IT IS HARD TO SIT STILL: MORE THAN HALF THE DAYS
4. TROUBLE RELAXING: MORE THAN HALF THE DAYS
7. FEELING AFRAID AS IF SOMETHING AWFUL MIGHT HAPPEN: NOT AT ALL
GAD7 TOTAL SCORE: 8
2. NOT BEING ABLE TO STOP OR CONTROL WORRYING: NOT AT ALL
GAD7 TOTAL SCORE: 8
7. FEELING AFRAID AS IF SOMETHING AWFUL MIGHT HAPPEN: NOT AT ALL
1. FEELING NERVOUS, ANXIOUS, OR ON EDGE: SEVERAL DAYS

## 2023-06-15 NOTE — TELEPHONE ENCOUNTER
Valium      Last Written Prescription Date:  4.10.23  Last Fill Quantity: #25,   # refills: 0  Last Office Visit: 9.27.22  Future Office visit:       Routing refill request to provider for review/approval because:  Drug not on the [facility], [facility] or [facility] refill protocol or controlled substance

## 2023-06-16 RX ORDER — DIAZEPAM 10 MG
TABLET ORAL
Qty: 25 TABLET | Refills: 0 | Status: SHIPPED | OUTPATIENT
Start: 2023-06-16 | End: 2023-08-07

## 2023-06-19 PROBLEM — F11.90 CHRONIC, CONTINUOUS USE OF OPIOIDS: Chronic | Status: RESOLVED | Noted: 2019-09-23 | Resolved: 2023-06-19

## 2023-06-19 NOTE — TELEPHONE ENCOUNTER
Completed [facility] PHQ-9/NITZA-7 for Depression Remission quality patient outreach per [facility] quality guidelines. This writer sees no major mental health and/or safety concerns at present.           2/28/2023     5:31 PM 4/12/2023     1:29 PM 6/15/2023     6:12 AM   PHQ   PHQ-9 Total Score 12 9 9   Q9: Thoughts of better off dead/self-harm past 2 weeks Not at all Not at all Not at all          2/28/2023     5:31 PM 4/12/2023     1:30 PM 6/15/2023     6:13 AM   NITZA-7 SCORE   Total Score 13 (moderate anxiety)  8 (mild anxiety)   Total Score 13 8 8     Lin Ramirez RN

## 2023-07-05 PROCEDURE — 99284 EMERGENCY DEPT VISIT MOD MDM: CPT

## 2023-07-05 PROCEDURE — 99282 EMERGENCY DEPT VISIT SF MDM: CPT | Performed by: INTERNAL MEDICINE

## 2023-07-06 ENCOUNTER — HOSPITAL ENCOUNTER (EMERGENCY)
Facility: HOSPITAL | Age: 28
Discharge: HOME | End: 2023-07-06
Attending: INTERNAL MEDICINE | Admitting: INTERNAL MEDICINE
Payer: COMMERCIAL

## 2023-07-06 VITALS
HEART RATE: 80 BPM | OXYGEN SATURATION: 100 % | SYSTOLIC BLOOD PRESSURE: 126 MMHG | DIASTOLIC BLOOD PRESSURE: 96 MMHG | TEMPERATURE: 97.6 F | RESPIRATION RATE: 20 BRPM

## 2023-07-06 DIAGNOSIS — R51.9 NONINTRACTABLE HEADACHE, UNSPECIFIED CHRONICITY PATTERN, UNSPECIFIED HEADACHE TYPE: ICD-10-CM

## 2023-07-06 PROCEDURE — 250N000011 HC RX IP 250 OP 636: Mod: JZ | Performed by: INTERNAL MEDICINE

## 2023-07-06 PROCEDURE — 96372 THER/PROPH/DIAG INJ SC/IM: CPT | Performed by: INTERNAL MEDICINE

## 2023-07-06 RX ORDER — KETOROLAC TROMETHAMINE 30 MG/ML
60 INJECTION, SOLUTION INTRAMUSCULAR; INTRAVENOUS ONCE
Status: COMPLETED | OUTPATIENT
Start: 2023-07-06 | End: 2023-07-06

## 2023-07-06 RX ORDER — IBUPROFEN 800 MG/1
TABLET, FILM COATED ORAL
COMMUNITY
Start: 2023-05-23 | End: 2023-08-02

## 2023-07-06 RX ORDER — BUTALBITAL/ACETAMINOPHEN 50MG-325MG
TABLET ORAL
COMMUNITY
Start: 2023-05-23 | End: 2023-08-02

## 2023-07-06 RX ORDER — HYDROCODONE BITARTRATE AND ACETAMINOPHEN 5; 325 MG/1; MG/1
TABLET ORAL
COMMUNITY
Start: 2023-05-23 | End: 2023-08-02

## 2023-07-06 RX ADMIN — KETOROLAC TROMETHAMINE 60 MG: 30 INJECTION, SOLUTION INTRAMUSCULAR at 00:44

## 2023-07-06 ASSESSMENT — ACTIVITIES OF DAILY LIVING (ADL): ADLS_ACUITY_SCORE: 35

## 2023-07-06 NOTE — ED NOTES
"Patient presents to ED w/ c/o having a migraine since the morning of 07/03/23, pain is a constant dull behind her eyes. Sensitive to smells and lights, dizziness, \"seeing spots\".  CMS intact.   Took her Imitrex, but has since ran out.  Reports that she hasn't had a migraine this bad in a long time, \"last time they gave me a shot of Toradol and that worked\".    "

## 2023-07-06 NOTE — Clinical Note
Valorie Awad was seen and treated in our emergency department on 7/5/2023.  She may return to work on 07/07/2023.       If you have any questions or concerns, please don't hesitate to call.      Carson Guerrero MD

## 2023-07-06 NOTE — ED TRIAGE NOTES
Pt presents with migraine since Monday, pt states it is actually a little better today but will not go away. Hx of migraines. Has taken her imitrex on Tuesday but ran out has also tried OTC meds with little relief.     Thang Valadez, MSN, RN on 7/5/2023 at 11:27 PM

## 2023-07-11 ASSESSMENT — ENCOUNTER SYMPTOMS
CHILLS: 0
ARTHRALGIAS: 0
FATIGUE: 0
FEVER: 0
NAUSEA: 1
APPETITE CHANGE: 0
DIARRHEA: 0
MYALGIAS: 0
HEMATURIA: 0
RHINORRHEA: 0
ABDOMINAL PAIN: 0
NECK STIFFNESS: 0
SHORTNESS OF BREATH: 0
DIZZINESS: 0
SORE THROAT: 0
COUGH: 0
DYSURIA: 0
HEADACHES: 1
ACTIVITY CHANGE: 0
VOMITING: 0
EYE REDNESS: 0

## 2023-07-11 NOTE — ED PROVIDER NOTES
History     Chief Complaint   Patient presents with     Headache     The history is provided by the patient.   Headache  Pain location:  Generalized  Quality:  Unable to specify  Severity currently:  7/10  Onset quality:  Gradual  Timing:  Constant  Progression:  Worsening  Chronicity:  Recurrent  Associated symptoms: nausea    Associated symptoms: no abdominal pain, no congestion, no cough, no diarrhea, no dizziness, no fatigue, no fever, no myalgias, no neck stiffness, no sore throat and no vomiting        Allergies:  Allergies   Allergen Reactions     Bactrim [Sulfamethoxazole W-Trimethoprim] Rash     Morphine Hcl Nausea and Vomiting     Clindamycin Other (See Comments)     Thrush       Nitrofurantoin      Macrobid      Nitrofurantoin Monohydrate Macrocrystals      Macrobid      Loracarbef Rash     Lorabid       Problem List:    Patient Active Problem List    Diagnosis Date Noted     Panic disorder without agoraphobia 10/18/2018     Priority: Medium     Patient is followed by  for ongoing prescription of benzodiazepines.  All refills should be approved by this provider, or covering partner.    Medication(s): Valium 10 mg.   Maximum quantity per month: 30  Clinic visit frequency required: Q 3 months     Controlled substance agreement on file: Yes       Date(s): 9.18.19  Benzodiazepine use reviewed by psychiatry:  Yes       Date(s): 9.18.19    Last [facility] website verification:  done on 9.18.19  [email or web address]           Recurrent major depressive disorder, in full remission (H) 10/18/2018     Priority: Medium     ACP (advance care planning) 03/22/2017     Priority: Medium     Advance Care Planning 7/6/2017: ACP Review of Chart / Resources Provided:  Reviewed chart for advance care plan.  Valorie ZHOU Delmi has no plan or code status on file. Discussed available resources and provided with information.   Added by KARLY CORBETT                 Spasm of muscle 08/12/2015     Priority:  Medium     Migraine 10/10/2007     Priority: Medium     Problem list name updated by automated process. Provider to review       Anxiety state 09/20/2001     Priority: Medium     Problem list name updated by automated process. Provider to review          Past Medical History:    Past Medical History:   Diagnosis Date     Abnormal Pap smear      Anxiety 9/20/2001     Marijuana use      Migraine headaches 10/10/2007     Supervision of other normal pregnancy        Past Surgical History:    Past Surgical History:   Procedure Laterality Date     Comminuted fracture elbow Left 10/25/2013    lauren placed     CYSTOSCOPY      bladder distention     TONSILLECTOMY         Family History:    Family History   Problem Relation Age of Onset     Other - See Comments Father         alcoholism     Depression Father      Mental Illness Father      Depression Mother      Irritable Bowel Syndrome Mother      Mental Illness Mother      Other - See Comments Mother         migraine headache     Other - See Comments Maternal Grandmother         blood disease     Lipids Maternal Grandmother         hyperlipidemia     Thyroid Disease Maternal Grandmother      Diabetes No family hx of      Asthma No family hx of        Social History:  Marital Status:  Single [1]  Social History     Tobacco Use     Smoking status: Never     Smokeless tobacco: Never   Vaping Use     Vaping Use: Never used   Substance Use Topics     Alcohol use: Yes     Comment: social     Drug use: No        Medications:    nabumetone (RELAFEN) 750 MG tablet  ondansetron (ZOFRAN) 8 MG tablet  SUMAtriptan (IMITREX) 100 MG tablet  valACYclovir (VALTREX) 500 MG tablet  APRI 0.15-30 MG-MCG tablet  Butalbital-Acetaminophen (PHRENILIN)  MG TABS per tablet  cyclobenzaprine (FLEXERIL) 10 MG tablet  cyclobenzaprine (FLEXERIL) 10 MG tablet  diazepam (VALIUM) 10 MG tablet  diclofenac (VOLTAREN) 1 % topical gel  diltiazem ER (DILT-XR) 120 MG 24 hr capsule  FLUoxetine (PROZAC) 10 MG  capsule  FLUoxetine (PROZAC) 20 MG capsule  HYDROcodone-acetaminophen (NORCO) 5-325 MG tablet  ibuprofen (ADVIL/MOTRIN) 800 MG tablet  ketorolac (TORADOL) 10 MG tablet  naproxen (NAPROSYN) 500 MG tablet          Review of Systems   Constitutional: Negative for activity change, appetite change, chills, fatigue and fever.   HENT: Negative for congestion, rhinorrhea and sore throat.    Eyes: Negative for redness.   Respiratory: Negative for cough and shortness of breath.    Cardiovascular: Negative for chest pain.   Gastrointestinal: Positive for nausea. Negative for abdominal pain, diarrhea and vomiting.   Genitourinary: Negative for dysuria and hematuria.   Musculoskeletal: Negative for arthralgias, myalgias and neck stiffness.   Skin: Negative for rash.   Neurological: Positive for headaches. Negative for dizziness.       Physical Exam   BP: 148/90  Pulse: 104  Temp: 97.6  F (36.4  C)  Resp: 20  SpO2: 98 %      Physical Exam  Constitutional:       General: She is not in acute distress.     Appearance: Normal appearance. She is not diaphoretic.   HENT:      Head: Atraumatic.      Mouth/Throat:      Mouth: Mucous membranes are moist.   Eyes:      General: No scleral icterus.     Conjunctiva/sclera: Conjunctivae normal.   Cardiovascular:      Rate and Rhythm: Normal rate.      Heart sounds: Normal heart sounds.   Pulmonary:      Effort: No respiratory distress.      Breath sounds: Normal breath sounds.   Abdominal:      General: Abdomen is flat.   Musculoskeletal:      Cervical back: Neck supple.   Skin:     General: Skin is warm.      Findings: No rash.   Neurological:      Mental Status: She is alert.         ED Course                 Procedures                No results found for this or any previous visit (from the past 24 hour(s)).    Medications   ketorolac (TORADOL) injection 60 mg (60 mg Intramuscular $Given 7/6/23 0044)       Assessments & Plan (with Medical Decision Making)   Migraine headache  Resolved after  receiving Toradol IM  D C Home  I have reviewed the nursing notes.    I have reviewed the findings, diagnosis, plan and need for follow up with the patient.        Discharge Medication List as of 7/6/2023  1:36 AM          Final diagnoses:   Nonintractable headache, unspecified chronicity pattern, unspecified headache type       7/5/2023   [facility]     Carson Guerrero MD  07/11/23 0411

## 2023-07-18 DIAGNOSIS — F32.2 CURRENT SEVERE EPISODE OF MAJOR DEPRESSIVE DISORDER WITHOUT PSYCHOTIC FEATURES, UNSPECIFIED WHETHER RECURRENT (H): ICD-10-CM

## 2023-07-18 RX ORDER — FLUOXETINE 10 MG/1
CAPSULE ORAL
Qty: 30 CAPSULE | Refills: 0 | OUTPATIENT
Start: 2023-07-18

## 2023-07-18 NOTE — TELEPHONE ENCOUNTER
Failed protocol        2/28/2023     5:31 PM 4/12/2023     1:29 PM 6/15/2023     6:12 AM   PHQ   PHQ-9 Total Score 12 9 9   Q9: Thoughts of better off dead/self-harm past 2 weeks Not at all Not at all Not at all

## 2023-08-02 ENCOUNTER — OFFICE VISIT (OUTPATIENT)
Dept: FAMILY MEDICINE | Facility: OTHER | Age: 28
End: 2023-08-02
Attending: NURSE PRACTITIONER
Payer: COMMERCIAL

## 2023-08-02 VITALS
BODY MASS INDEX: 33.1 KG/M2 | OXYGEN SATURATION: 98 % | SYSTOLIC BLOOD PRESSURE: 108 MMHG | DIASTOLIC BLOOD PRESSURE: 60 MMHG | HEART RATE: 104 BPM | WEIGHT: 202 LBS | TEMPERATURE: 97 F

## 2023-08-02 DIAGNOSIS — L65.9 THINNING HAIR: ICD-10-CM

## 2023-08-02 DIAGNOSIS — M25.512 CHRONIC PAIN OF BOTH SHOULDERS: Primary | ICD-10-CM

## 2023-08-02 DIAGNOSIS — G89.29 CHRONIC PAIN OF BOTH SHOULDERS: Primary | ICD-10-CM

## 2023-08-02 DIAGNOSIS — M25.511 CHRONIC PAIN OF BOTH SHOULDERS: Primary | ICD-10-CM

## 2023-08-02 DIAGNOSIS — G43.719 INTRACTABLE CHRONIC MIGRAINE WITHOUT AURA AND WITHOUT STATUS MIGRAINOSUS: ICD-10-CM

## 2023-08-02 PROCEDURE — 99213 OFFICE O/P EST LOW 20 MIN: CPT | Performed by: NURSE PRACTITIONER

## 2023-08-02 PROCEDURE — G0463 HOSPITAL OUTPT CLINIC VISIT: HCPCS

## 2023-08-02 RX ORDER — PROCHLORPERAZINE MALEATE 10 MG
5-10 TABLET ORAL EVERY 8 HOURS PRN
Qty: 10 TABLET | Refills: 1 | Status: SHIPPED | OUTPATIENT
Start: 2023-08-02

## 2023-08-02 RX ORDER — NAPROXEN 500 MG/1
TABLET ORAL
Qty: 60 TABLET | Refills: 0 | Status: ON HOLD | OUTPATIENT
Start: 2023-08-02 | End: 2023-11-02

## 2023-08-02 ASSESSMENT — ANXIETY QUESTIONNAIRES
4. TROUBLE RELAXING: MORE THAN HALF THE DAYS
7. FEELING AFRAID AS IF SOMETHING AWFUL MIGHT HAPPEN: NOT AT ALL
1. FEELING NERVOUS, ANXIOUS, OR ON EDGE: NEARLY EVERY DAY
5. BEING SO RESTLESS THAT IT IS HARD TO SIT STILL: MORE THAN HALF THE DAYS
6. BECOMING EASILY ANNOYED OR IRRITABLE: NEARLY EVERY DAY
GAD7 TOTAL SCORE: 12
2. NOT BEING ABLE TO STOP OR CONTROL WORRYING: SEVERAL DAYS
3. WORRYING TOO MUCH ABOUT DIFFERENT THINGS: SEVERAL DAYS
IF YOU CHECKED OFF ANY PROBLEMS ON THIS QUESTIONNAIRE, HOW DIFFICULT HAVE THESE PROBLEMS MADE IT FOR YOU TO DO YOUR WORK, TAKE CARE OF THINGS AT HOME, OR GET ALONG WITH OTHER PEOPLE: SOMEWHAT DIFFICULT
GAD7 TOTAL SCORE: 12

## 2023-08-02 ASSESSMENT — PAIN SCALES - GENERAL: PAINLEVEL: MODERATE PAIN (5)

## 2023-08-02 ASSESSMENT — PATIENT HEALTH QUESTIONNAIRE - PHQ9: SUM OF ALL RESPONSES TO PHQ QUESTIONS 1-9: 13
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
## 2023-08-02 NOTE — LETTER
August 2, 2023      Valorie Awad  [address]  [place] [address]        To Whom It May Concern:    Valorie Awad  was seen on 8/2/2023.  Please excuse her for her appointment today.        Sincerely,        CARLOS Feliz CNP

## 2023-08-07 ENCOUNTER — VIRTUAL VISIT (OUTPATIENT)
Dept: PSYCHIATRY | Facility: OTHER | Age: 28
End: 2023-08-07
Attending: PSYCHIATRY & NEUROLOGY
Payer: COMMERCIAL

## 2023-08-07 DIAGNOSIS — F32.2 CURRENT SEVERE EPISODE OF MAJOR DEPRESSIVE DISORDER WITHOUT PSYCHOTIC FEATURES, UNSPECIFIED WHETHER RECURRENT (H): ICD-10-CM

## 2023-08-07 DIAGNOSIS — F33.2 MAJOR DEPRESSIVE DISORDER, RECURRENT SEVERE WITHOUT PSYCHOTIC FEATURES (H): ICD-10-CM

## 2023-08-07 DIAGNOSIS — F41.1 GAD (GENERALIZED ANXIETY DISORDER): Primary | ICD-10-CM

## 2023-08-07 DIAGNOSIS — G43.719 INTRACTABLE CHRONIC MIGRAINE WITHOUT AURA AND WITHOUT STATUS MIGRAINOSUS: ICD-10-CM

## 2023-08-07 DIAGNOSIS — F41.0 PANIC DISORDER WITHOUT AGORAPHOBIA: ICD-10-CM

## 2023-08-07 PROCEDURE — 99442 PR PHYSICIAN TELEPHONE EVALUATION 11-20 MIN: CPT | Mod: 93 | Performed by: PSYCHIATRY & NEUROLOGY

## 2023-08-07 RX ORDER — HYDROXYZINE HYDROCHLORIDE 25 MG/1
TABLET, FILM COATED ORAL
Qty: 60 TABLET | Refills: 4 | Status: SHIPPED | OUTPATIENT
Start: 2023-08-07

## 2023-08-07 RX ORDER — FLUOXETINE 10 MG/1
CAPSULE ORAL
Qty: 90 CAPSULE | Refills: 3 | Status: SHIPPED | OUTPATIENT
Start: 2023-08-07 | End: 2024-09-23

## 2023-08-07 RX ORDER — DIAZEPAM 10 MG
10 TABLET ORAL DAILY PRN
Qty: 25 TABLET | Refills: 1 | Status: SHIPPED | OUTPATIENT
Start: 2023-08-07 | End: 2023-11-10

## 2023-08-07 RX ORDER — DILTIAZEM HYDROCHLORIDE 120 MG/1
120 CAPSULE, EXTENDED RELEASE ORAL DAILY
Qty: 30 CAPSULE | Refills: 0 | Status: SHIPPED | OUTPATIENT
Start: 2023-08-07 | End: 2023-10-05

## 2023-08-07 ASSESSMENT — PAIN SCALES - GENERAL: PAINLEVEL: NO PAIN (0)

## 2023-08-07 NOTE — PROGRESS NOTES
Valorie is a 28 year old who is being evaluated via a billable telephone visit.      What phone number would you like to be contacted at? [phone]  How would you like to obtain your AVS? MyChart     Telephone visit 12 minutes.  5 minutes day of visit reviewing chart, ordering medications, documenting. Total visit time 17 minutes.    PSYCHIATRY CLINIC PROGRESS NOTE     SUBJECTIVE / INTERIM HISTORY                                                                       :   Children-  Traevion is 9 yo   Last 8/2/23:     Continue fluoxetine 10 mg daily. Continue diazepam 10 mg daily prn filled 5/10/21 with 1 refill.  Discontinued prazosin 1 mg HS    - still living in [place]. Her, cousin, and Fredis  - things were going pretty well for awhile. Then general anxiety started 2-3 months ago. Feels nervous even when there's not trigger. Sleep has been okay. Decreased appetite. At times gets feeling of tightness in her chest  - working at [employer]. Started in June.   - depression has generally been good, it's just       SYMPTOMS-  better energy, insomnia still, easily startled, hypervigilance, situations reminscent of trauma cause fear / panic. Sx of anxiety have been better:  Excessive worry, easily overwhelmed, panic attacks.   MEDICAL ROS- low appetite, some weight loss, sometimes nausea. Hip / sciatic nerve pain, Migraines  MEDICAL / SURGICAL HISTORY                pregnant [if applicable]--no   Medical Team:     PMD- Dr. Jo Muro    Therapist- [facility]   Patient Active Problem List   Diagnosis    Anxiety state    Migraine    Spasm of muscle    ACP (advance care planning)    Panic disorder without agoraphobia    Recurrent major depressive disorder, in full remission (H)     ALLERGY   Bactrim [sulfamethoxazole w-trimethoprim], Morphine hcl, Clindamycin, Nitrofurantoin, Nitrofurantoin monohydrate macrocrystals, and Loracarbef  MEDICATIONS                                                                                              Current Outpatient Medications   Medication Sig    APRI 0.15-30 MG-MCG tablet TAKE 1 TABLET BY MOUTH DAILY    cyclobenzaprine (FLEXERIL) 10 MG tablet Take 1 tablet (10 mg) by mouth 3 times daily as needed for muscle spasms    diazepam (VALIUM) 10 MG tablet TAKE 1 TABLET BY MOUTH DAILY AS NEEDED FOR ANXIETY    diclofenac (VOLTAREN) 1 % topical gel Apply 4 g topically 4 times daily    diltiazem ER (DILT-XR) 120 MG 24 hr capsule TAKE 1 CAPSULE BY MOUTH DAILY    FLUoxetine (PROZAC) 10 MG capsule TAKE 1 CAPSULE BY MOUTH DAILY WITH 20 MG IN THE MORNING    FLUoxetine (PROZAC) 20 MG capsule TAKE 1 CAPSULE BY MOUTH DAILY    naproxen (NAPROSYN) 500 MG tablet TAKE 1 TABLET (500 MG) BY MOUTH TWICE DAILY WITH MEALS    prochlorperazine (COMPAZINE) 10 MG tablet Take 0.5-1 tablets (5-10 mg) by mouth every 8 hours as needed for nausea, vomiting or other (migraine headache)    SUMAtriptan (IMITREX) 100 MG tablet Take 1 tablet (100 mg) by mouth at onset of headache for migraine May repeat in 2 hours if needed: max 2/day; average number of headaches monthly    valACYclovir (VALTREX) 500 MG tablet Take 1 tablet (500 mg) by mouth daily     No current facility-administered medications for this visit.       VITALS   There were no vitals taken for this visit.     PHQ9                           4/12/2023     1:29 PM 6/15/2023     6:12 AM 8/2/2023     9:24 AM   PHQ-9 SCORE   PHQ-9 Total Score MyChart  9 (Mild depression)    PHQ-9 Total Score 9 9 13       MENTAL STATUS EXAM                                                                                       Mood anxious. Thought process, including associations, was unremarkable and thought content was devoid of suicidal and homicidal ideation and psychotic thought. No hallucinations. Insight was good. Judgment was intact and adequate for safety. Fund of knowledge was intact. Pt demonstrates no obvious problems with attention, concentration, language, recent or remote memory  although these were not formally tested.     ASSESSMENT                                                                                                      HISTORICAL:  Initial psych note 2/13/15       Notes: Buspar ineffective. Zoloft. Effexor up to 75 : ineffective and made her feel foggy and sick.   she stopped it. Paxil ineffective. Gabapentin : sedation. Past citalopram in med section (2013). Propranolol 10 mg BID ineffective. Lexapro was on 10 mg then ran out in between visits. Prazosin; insomnia . topamax fatigue, ineffective    Valorie Awad is a 29 yo with panic disorder, MDD, recurrent,and NITZA and PTSD. I haven't touched base with her for ~1-1/2 years. Overall Valorie is doing better than she used to. She is still taking Prozac and uses Valium on an as needed basis; it makes her tired hence all the mores she doesn't take that often. Today she notes panic attacks have been under adequate control as has depression. General anxiety however has been creeping in over the past couple months. We agreed on adding in prn of hydroxyzine. Discussed this also can be sedating and she can try 25 mg and take 2 need be. If 25 mg is too sedating I noted they are tabs so she could try cutting it in half.     TREATMENT RISK STATEMENT:  The risks, benefits, alternatives and potential adverse effects have been explained and are understood by the pt.  The pt agrees to the treatment plan with the ability to do so.  Discussion of specific concerns included- potential SEs meds.  The pt knows to call the clinic for any problems or access emergency care if needed.   There are no medical considerations relevant to treatment, as noted above.  Substance use is not a problem as noted above.         DIAGNOSES            PTSD   Panic disorder without agoraphobia  Major depressive disorder, recurrent, moderate  NITZA      PLAN                                                                                                                            1) MEDICATIONS: Continue fluoxetine 30 mg daily. Continue diazepam 10 mg daily prn filled today #25 with 1 refill. Start hydroxyzine 25 mg take 1 -2 tabs up to 2 times daily as needed for anxiety.     2) THERAPY: No Change.     3) LABS: None    4) PT MONITOR [call for probs]: worsening sx, side effects, if develops SI    5) REFERRALS [CD tx, medical, tests other]: None    6)  RTC: ~prn

## 2023-08-18 ENCOUNTER — TRANSFERRED RECORDS (OUTPATIENT)
Dept: HEALTH INFORMATION MANAGEMENT | Facility: CLINIC | Age: 28
End: 2023-08-18

## 2023-09-13 DIAGNOSIS — Z30.019 ENCOUNTER FOR FEMALE BIRTH CONTROL: ICD-10-CM

## 2023-09-15 RX ORDER — DESOGESTREL AND ETHINYL ESTRADIOL 0.15-0.03
1 KIT ORAL DAILY
Qty: 84 TABLET | Refills: 0 | Status: SHIPPED | OUTPATIENT
Start: 2023-09-15 | End: 2023-11-28

## 2023-09-15 NOTE — TELEPHONE ENCOUNTER
ENSKYCE 0.15-30 MG-MCG tablet       Last Written Prescription Date:  6/27/2023  Last Fill Quantity: 84,   # refills: 0  Last Office Visit: 9/12/2023

## 2023-09-21 DIAGNOSIS — G43.719 INTRACTABLE CHRONIC MIGRAINE WITHOUT AURA AND WITHOUT STATUS MIGRAINOSUS: ICD-10-CM

## 2023-09-22 ENCOUNTER — MEDICAL CORRESPONDENCE (OUTPATIENT)
Dept: INTERVENTIONAL RADIOLOGY/VASCULAR | Facility: HOSPITAL | Age: 28
End: 2023-09-22

## 2023-09-25 RX ORDER — DILTIAZEM HYDROCHLORIDE 120 MG/1
120 CAPSULE, EXTENDED RELEASE ORAL DAILY
Qty: 30 CAPSULE | Refills: 0 | OUTPATIENT
Start: 2023-09-25

## 2023-09-25 NOTE — TELEPHONE ENCOUNTER
Diltiazem      Last Written Prescription Date:  8.21.23  Last Fill Quantity: #30,   # refills: 0  Last Office Visit: 8.7.23  Future Office visit:       Routing refill request to provider for review/approval because:

## 2023-10-02 DIAGNOSIS — G43.719 INTRACTABLE CHRONIC MIGRAINE WITHOUT AURA AND WITHOUT STATUS MIGRAINOSUS: ICD-10-CM

## 2023-10-03 ENCOUNTER — MEDICAL CORRESPONDENCE (OUTPATIENT)
Dept: HEALTH INFORMATION MANAGEMENT | Facility: HOSPITAL | Age: 28
End: 2023-10-03

## 2023-10-03 NOTE — TELEPHONE ENCOUNTER
Diltiazem      Last Written Prescription Date:  8/7/23  Last Fill Quantity: 30,   # refills: 0  Last Office Visit: 9/27/22  Future Office visit:       Routing refill request to provider for review/approval because:

## 2023-10-04 RX ORDER — DILTIAZEM HYDROCHLORIDE 120 MG/1
120 CAPSULE, EXTENDED RELEASE ORAL DAILY
Qty: 30 CAPSULE | Refills: 0 | OUTPATIENT
Start: 2023-10-04

## 2023-10-05 RX ORDER — DILTIAZEM HYDROCHLORIDE 120 MG/1
120 CAPSULE, EXTENDED RELEASE ORAL DAILY
Qty: 30 CAPSULE | Refills: 0 | Status: SHIPPED | OUTPATIENT
Start: 2023-10-05 | End: 2023-11-09

## 2023-10-11 ENCOUNTER — HOSPITAL ENCOUNTER (OUTPATIENT)
Dept: GENERAL RADIOLOGY | Facility: HOSPITAL | Age: 28
Discharge: HOME | End: 2023-10-11
Attending: FAMILY MEDICINE | Admitting: RADIOLOGY
Payer: COMMERCIAL

## 2023-10-11 ENCOUNTER — HOSPITAL ENCOUNTER (OUTPATIENT)
Facility: HOSPITAL | Age: 28
Discharge: HOME | End: 2023-10-11
Attending: RADIOLOGY | Admitting: RADIOLOGY
Payer: COMMERCIAL

## 2023-10-11 DIAGNOSIS — M51.16 INTERVERTEBRAL DISC DISORDER WITH RADICULOPATHY OF LUMBAR REGION: ICD-10-CM

## 2023-10-11 PROCEDURE — 250N000011 HC RX IP 250 OP 636: Mod: JZ | Performed by: RADIOLOGY

## 2023-10-11 PROCEDURE — 64483 NJX AA&/STRD TFRM EPI L/S 1: CPT

## 2023-10-11 PROCEDURE — 250N000009 HC RX 250: Performed by: RADIOLOGY

## 2023-10-11 RX ORDER — DEXAMETHASONE SODIUM PHOSPHATE 10 MG/ML
10 INJECTION, SOLUTION INTRAMUSCULAR; INTRAVENOUS ONCE
Status: COMPLETED | OUTPATIENT
Start: 2023-10-11 | End: 2023-10-11

## 2023-10-11 RX ORDER — LIDOCAINE HYDROCHLORIDE 10 MG/ML
5 INJECTION, SOLUTION EPIDURAL; INFILTRATION; INTRACAUDAL; PERINEURAL ONCE
Status: COMPLETED | OUTPATIENT
Start: 2023-10-11 | End: 2023-10-11

## 2023-10-11 RX ORDER — IOPAMIDOL 612 MG/ML
15 INJECTION, SOLUTION INTRATHECAL ONCE
Status: COMPLETED | OUTPATIENT
Start: 2023-10-11 | End: 2023-10-11

## 2023-10-11 RX ADMIN — DEXAMETHASONE SODIUM PHOSPHATE 10 MG: 10 INJECTION, SOLUTION INTRAMUSCULAR; INTRAVENOUS at 14:06

## 2023-10-11 RX ADMIN — IOPAMIDOL 5 ML: 612 INJECTION, SOLUTION INTRATHECAL at 14:05

## 2023-10-11 RX ADMIN — LIDOCAINE HYDROCHLORIDE 2 ML: 10 INJECTION, SOLUTION EPIDURAL; INFILTRATION; INTRACAUDAL; PERINEURAL at 14:07

## 2023-10-11 NOTE — DISCHARGE INSTRUCTIONS
Cell number on file:    Telephone Information:   Mobile [phone]     Is it ok to leave a message at this number(s)? Yes    Dr Tejada completed your procedure on 10/11/2023.    Current Pain Level (0-10 Scale): 7/10  Post Pain Level (0-10):  0/10    Radiology Discharge instructions for Steroid Injection    Activity Level:     Do not do any heavy activity or exercise for 24 hours.   Do not drive for 4 hours after your injection.  Diet:   Return to your normal diet.  Medications:   If you have stopped taking your Aspirin, Coumadin/Warfarin, Ibuprofen, or any   other blood thinner for this procedure you may resume in the morning unless   your primary care provider has given you other instructions.    Diabetics may see an increase in blood sugar after steroid injections. If you are concerned about your blood sugar, please contact your family doctor.    Site Care:  Remove the bandage and bathe or shower the morning after the procedure.      Please allow two weeks to experience improvement in your pain.  If you have any further issues, please contact your provider.    Call your Primary Care Provider if you have the following (if your primary care provider is not available please seek emergency care):   Nausea with vomiting   Severe headache   Drowsiness or confusion   Redness or drainage at the injection or puncture site   Temperature over 101 degrees F   Other concerns   Worsening back pain   Stiff neck

## 2023-10-24 ENCOUNTER — TELEPHONE (OUTPATIENT)
Dept: INTERVENTIONAL RADIOLOGY/VASCULAR | Facility: HOSPITAL | Age: 28
End: 2023-10-24

## 2023-10-24 NOTE — TELEPHONE ENCOUNTER
INJECTION POST CALL    Procedure: Epidural TF RT L5-S1  Radiologist(s): Dr. Archie Tejada  Date of Procedure:  10/11/23      Patient was unavailable by phone.     Left a message for patient to call IR department back.    Desiree Shetty

## 2023-10-27 ENCOUNTER — TELEPHONE (OUTPATIENT)
Dept: INTERVENTIONAL RADIOLOGY/VASCULAR | Facility: HOSPITAL | Age: 28
End: 2023-10-27

## 2023-10-27 NOTE — TELEPHONE ENCOUNTER
INJECTION POST CALL    Procedure: Epidural TF RT L5-S1  Radiologist(s): Dr. Archie Tejada  Date of Procedure:  10/11/23    PATIENT WAS UNAVAILABLE BY PHONE.    2nd attempt, left a message. Will mail out a post card.      Desiree Sehtty

## 2023-10-31 ENCOUNTER — TELEPHONE (OUTPATIENT)
Dept: INTERVENTIONAL RADIOLOGY/VASCULAR | Facility: HOSPITAL | Age: 28
End: 2023-10-31

## 2023-10-31 NOTE — TELEPHONE ENCOUNTER
INJECTION POST CALL    Procedure: Epidural TF RT L5-S1  Radiologist(s): Dr. Archie Tejada  Date of Procedure:  10/11/23    Relief of pain from this injection    A = 90%  A- = 85%  B = 80%  B- =75%  C = 70%  C- = 65%  D = 60%  D- = 50%  F = less than 50%      Do you feel this injection was beneficial? Somewhat, Patient is getting 40% of relief.    If yes, how long did it last? Patient is still experiencing relief on her right side but her left side is acting up    Instruct patient to follow up with their provider for any further care they may need. (as Dr. Harmon will no longer be making recommendations nor addended the exam with recommmendations)    Desiree Sehtty

## 2023-11-01 ENCOUNTER — HOSPITAL ENCOUNTER (INPATIENT)
Facility: HOSPITAL | Age: 28
LOS: 4 days | Discharge: HOME | End: 2023-11-05
Attending: NURSE PRACTITIONER | Admitting: HOSPITALIST
Payer: COMMERCIAL

## 2023-11-01 ENCOUNTER — APPOINTMENT (OUTPATIENT)
Dept: CT IMAGING | Facility: HOSPITAL | Age: 28
End: 2023-11-01
Attending: NURSE PRACTITIONER
Payer: COMMERCIAL

## 2023-11-01 DIAGNOSIS — A41.9 SEPSIS (H): ICD-10-CM

## 2023-11-01 DIAGNOSIS — N10 PYELONEPHRITIS, ACUTE: ICD-10-CM

## 2023-11-01 LAB
ALBUMIN SERPL BCG-MCNC: 3.6 G/DL (ref 3.5–5.2)
ALBUMIN UR-MCNC: 200 MG/DL
ALP SERPL-CCNC: 88 U/L (ref 35–104)
ALT SERPL W P-5'-P-CCNC: 13 U/L (ref 0–50)
AMORPH CRY #/AREA URNS HPF: ABNORMAL /HPF
ANION GAP SERPL CALCULATED.3IONS-SCNC: 12 MMOL/L (ref 7–15)
APPEARANCE UR: ABNORMAL
AST SERPL W P-5'-P-CCNC: 14 U/L (ref 0–45)
BACTERIA #/AREA URNS HPF: ABNORMAL /HPF
BASOPHILS # BLD AUTO: 0.1 10E3/UL (ref 0–0.2)
BASOPHILS NFR BLD AUTO: 0 %
BILIRUB SERPL-MCNC: 0.3 MG/DL
BILIRUB UR QL STRIP: NEGATIVE
BUN SERPL-MCNC: 11.1 MG/DL (ref 6–20)
CALCIUM SERPL-MCNC: 9.4 MG/DL (ref 8.6–10)
CHLORIDE SERPL-SCNC: 100 MMOL/L (ref 98–107)
COLOR UR AUTO: ABNORMAL
CREAT SERPL-MCNC: 0.89 MG/DL (ref 0.51–0.95)
DEPRECATED HCO3 PLAS-SCNC: 23 MMOL/L (ref 22–29)
EGFRCR SERPLBLD CKD-EPI 2021: 90 ML/MIN/1.73M2
EOSINOPHIL # BLD AUTO: 0.1 10E3/UL (ref 0–0.7)
EOSINOPHIL NFR BLD AUTO: 0 %
ERYTHROCYTE [DISTWIDTH] IN BLOOD BY AUTOMATED COUNT: 12.3 % (ref 10–15)
GLUCOSE SERPL-MCNC: 130 MG/DL (ref 70–99)
GLUCOSE UR STRIP-MCNC: NEGATIVE MG/DL
HCG UR QL: NEGATIVE
HCT VFR BLD AUTO: 34.5 % (ref 35–47)
HGB BLD-MCNC: 11.9 G/DL (ref 11.7–15.7)
HGB UR QL STRIP: ABNORMAL
HYALINE CASTS: 7 /LPF
IMM GRANULOCYTES # BLD: 0.2 10E3/UL
IMM GRANULOCYTES NFR BLD: 1 %
KETONES UR STRIP-MCNC: ABNORMAL MG/DL
LACTATE SERPL-SCNC: 1.1 MMOL/L (ref 0.7–2)
LEUKOCYTE ESTERASE UR QL STRIP: ABNORMAL
LIPASE SERPL-CCNC: 19 U/L (ref 13–60)
LYMPHOCYTES # BLD AUTO: 1.8 10E3/UL (ref 0.8–5.3)
LYMPHOCYTES NFR BLD AUTO: 7 %
MCH RBC QN AUTO: 29.8 PG (ref 26.5–33)
MCHC RBC AUTO-ENTMCNC: 34.5 G/DL (ref 31.5–36.5)
MCV RBC AUTO: 86 FL (ref 78–100)
MONOCYTES # BLD AUTO: 1.6 10E3/UL (ref 0–1.3)
MONOCYTES NFR BLD AUTO: 6 %
MUCOUS THREADS #/AREA URNS LPF: PRESENT /LPF
NEUTROPHILS # BLD AUTO: 21.9 10E3/UL (ref 1.6–8.3)
NEUTROPHILS NFR BLD AUTO: 86 %
NITRATE UR QL: NEGATIVE
NRBC # BLD AUTO: 0 10E3/UL
NRBC BLD AUTO-RTO: 0 /100
PH UR STRIP: 6.5 [PH] (ref 4.7–8)
PLATELET # BLD AUTO: 246 10E3/UL (ref 150–450)
POTASSIUM SERPL-SCNC: 3.4 MMOL/L (ref 3.4–5.3)
PROCALCITONIN SERPL IA-MCNC: 0.5 NG/ML
PROT SERPL-MCNC: 6.5 G/DL (ref 6.4–8.3)
RBC # BLD AUTO: 4 10E6/UL (ref 3.8–5.2)
RBC URINE: 42 /HPF
SODIUM SERPL-SCNC: 135 MMOL/L (ref 135–145)
SP GR UR STRIP: 1.02 (ref 1–1.03)
SQUAMOUS EPITHELIAL: 22 /HPF
UROBILINOGEN UR STRIP-MCNC: NORMAL MG/DL
WBC # BLD AUTO: 25.6 10E3/UL (ref 4–11)
WBC CLUMPS #/AREA URNS HPF: PRESENT /HPF
WBC URINE: >182 /HPF
YEAST #/AREA URNS HPF: ABNORMAL /HPF

## 2023-11-01 PROCEDURE — 83605 ASSAY OF LACTIC ACID: CPT | Performed by: NURSE PRACTITIONER

## 2023-11-01 PROCEDURE — 250N000011 HC RX IP 250 OP 636: Mod: JZ | Performed by: NURSE PRACTITIONER

## 2023-11-01 PROCEDURE — 250N000013 HC RX MED GY IP 250 OP 250 PS 637: Performed by: HOSPITALIST

## 2023-11-01 PROCEDURE — 74177 CT ABD & PELVIS W/CONTRAST: CPT

## 2023-11-01 PROCEDURE — 258N000003 HC RX IP 258 OP 636: Performed by: HOSPITALIST

## 2023-11-01 PROCEDURE — 99285 EMERGENCY DEPT VISIT HI MDM: CPT | Mod: 25

## 2023-11-01 PROCEDURE — 87077 CULTURE AEROBIC IDENTIFY: CPT | Performed by: NURSE PRACTITIONER

## 2023-11-01 PROCEDURE — 36415 COLL VENOUS BLD VENIPUNCTURE: CPT | Performed by: NURSE PRACTITIONER

## 2023-11-01 PROCEDURE — 87186 SC STD MICRODIL/AGAR DIL: CPT | Performed by: NURSE PRACTITIONER

## 2023-11-01 PROCEDURE — 99285 EMERGENCY DEPT VISIT HI MDM: CPT | Performed by: NURSE PRACTITIONER

## 2023-11-01 PROCEDURE — 81025 URINE PREGNANCY TEST: CPT | Performed by: NURSE PRACTITIONER

## 2023-11-01 PROCEDURE — 96374 THER/PROPH/DIAG INJ IV PUSH: CPT

## 2023-11-01 PROCEDURE — 81001 URINALYSIS AUTO W/SCOPE: CPT | Performed by: NURSE PRACTITIONER

## 2023-11-01 PROCEDURE — 96361 HYDRATE IV INFUSION ADD-ON: CPT

## 2023-11-01 PROCEDURE — 96375 TX/PRO/DX INJ NEW DRUG ADDON: CPT

## 2023-11-01 PROCEDURE — 84145 PROCALCITONIN (PCT): CPT | Performed by: NURSE PRACTITIONER

## 2023-11-01 PROCEDURE — 99223 1ST HOSP IP/OBS HIGH 75: CPT | Mod: AI | Performed by: HOSPITALIST

## 2023-11-01 PROCEDURE — 120N000001 HC R&B MED SURG/OB

## 2023-11-01 PROCEDURE — 258N000003 HC RX IP 258 OP 636: Performed by: NURSE PRACTITIONER

## 2023-11-01 PROCEDURE — 80053 COMPREHEN METABOLIC PANEL: CPT | Performed by: NURSE PRACTITIONER

## 2023-11-01 PROCEDURE — 83690 ASSAY OF LIPASE: CPT | Performed by: NURSE PRACTITIONER

## 2023-11-01 PROCEDURE — 85041 AUTOMATED RBC COUNT: CPT | Performed by: NURSE PRACTITIONER

## 2023-11-01 RX ORDER — AMOXICILLIN 250 MG
1 CAPSULE ORAL 2 TIMES DAILY PRN
Status: DISCONTINUED | OUTPATIENT
Start: 2023-11-01 | End: 2023-11-05 | Stop reason: HOSPADM

## 2023-11-01 RX ORDER — NALOXONE HYDROCHLORIDE 0.4 MG/ML
0.2 INJECTION, SOLUTION INTRAMUSCULAR; INTRAVENOUS; SUBCUTANEOUS
Status: DISCONTINUED | OUTPATIENT
Start: 2023-11-01 | End: 2023-11-05 | Stop reason: HOSPADM

## 2023-11-01 RX ORDER — CYCLOBENZAPRINE HCL 10 MG
10 TABLET ORAL 3 TIMES DAILY PRN
Status: DISCONTINUED | OUTPATIENT
Start: 2023-11-01 | End: 2023-11-05 | Stop reason: HOSPADM

## 2023-11-01 RX ORDER — FLUOXETINE 10 MG/1
10 CAPSULE ORAL DAILY
Status: DISCONTINUED | OUTPATIENT
Start: 2023-11-02 | End: 2023-11-05 | Stop reason: HOSPADM

## 2023-11-01 RX ORDER — ONDANSETRON 2 MG/ML
4 INJECTION INTRAMUSCULAR; INTRAVENOUS EVERY 6 HOURS PRN
Status: DISCONTINUED | OUTPATIENT
Start: 2023-11-01 | End: 2023-11-05 | Stop reason: HOSPADM

## 2023-11-01 RX ORDER — PROCHLORPERAZINE 25 MG
25 SUPPOSITORY, RECTAL RECTAL EVERY 12 HOURS PRN
Status: DISCONTINUED | OUTPATIENT
Start: 2023-11-01 | End: 2023-11-05 | Stop reason: HOSPADM

## 2023-11-01 RX ORDER — NALOXONE HYDROCHLORIDE 0.4 MG/ML
0.4 INJECTION, SOLUTION INTRAMUSCULAR; INTRAVENOUS; SUBCUTANEOUS
Status: DISCONTINUED | OUTPATIENT
Start: 2023-11-01 | End: 2023-11-05 | Stop reason: HOSPADM

## 2023-11-01 RX ORDER — AMOXICILLIN 250 MG
2 CAPSULE ORAL 2 TIMES DAILY PRN
Status: DISCONTINUED | OUTPATIENT
Start: 2023-11-01 | End: 2023-11-05 | Stop reason: HOSPADM

## 2023-11-01 RX ORDER — IOPAMIDOL 755 MG/ML
109 INJECTION, SOLUTION INTRAVASCULAR ONCE
Status: COMPLETED | OUTPATIENT
Start: 2023-11-01 | End: 2023-11-01

## 2023-11-01 RX ORDER — DIAZEPAM 5 MG
10 TABLET ORAL DAILY PRN
Status: DISCONTINUED | OUTPATIENT
Start: 2023-11-01 | End: 2023-11-05 | Stop reason: HOSPADM

## 2023-11-01 RX ORDER — KETOROLAC TROMETHAMINE 30 MG/ML
30 INJECTION, SOLUTION INTRAMUSCULAR; INTRAVENOUS ONCE
Status: COMPLETED | OUTPATIENT
Start: 2023-11-01 | End: 2023-11-01

## 2023-11-01 RX ORDER — ENOXAPARIN SODIUM 100 MG/ML
40 INJECTION SUBCUTANEOUS EVERY 24 HOURS
Status: DISCONTINUED | OUTPATIENT
Start: 2023-11-01 | End: 2023-11-05 | Stop reason: HOSPADM

## 2023-11-01 RX ORDER — LEVOFLOXACIN 5 MG/ML
750 INJECTION, SOLUTION INTRAVENOUS ONCE
Status: COMPLETED | OUTPATIENT
Start: 2023-11-01 | End: 2023-11-01

## 2023-11-01 RX ORDER — HYDROXYZINE HYDROCHLORIDE 10 MG/1
10 TABLET, FILM COATED ORAL 4 TIMES DAILY PRN
Status: DISCONTINUED | OUTPATIENT
Start: 2023-11-01 | End: 2023-11-05 | Stop reason: HOSPADM

## 2023-11-01 RX ORDER — ACETAMINOPHEN 325 MG/1
650 TABLET ORAL EVERY 4 HOURS PRN
Status: DISCONTINUED | OUTPATIENT
Start: 2023-11-01 | End: 2023-11-05 | Stop reason: HOSPADM

## 2023-11-01 RX ORDER — MORPHINE SULFATE 2 MG/ML
2 INJECTION, SOLUTION INTRAMUSCULAR; INTRAVENOUS
Status: DISCONTINUED | OUTPATIENT
Start: 2023-11-01 | End: 2023-11-05 | Stop reason: HOSPADM

## 2023-11-01 RX ORDER — PROCHLORPERAZINE MALEATE 10 MG
10 TABLET ORAL EVERY 6 HOURS PRN
Status: DISCONTINUED | OUTPATIENT
Start: 2023-11-01 | End: 2023-11-05 | Stop reason: HOSPADM

## 2023-11-01 RX ORDER — LEVOFLOXACIN 5 MG/ML
750 INJECTION, SOLUTION INTRAVENOUS EVERY 24 HOURS
Status: DISCONTINUED | OUTPATIENT
Start: 2023-11-02 | End: 2023-11-04

## 2023-11-01 RX ORDER — ACETAMINOPHEN 650 MG/1
650 SUPPOSITORY RECTAL EVERY 4 HOURS PRN
Status: DISCONTINUED | OUTPATIENT
Start: 2023-11-01 | End: 2023-11-05 | Stop reason: HOSPADM

## 2023-11-01 RX ORDER — ONDANSETRON 4 MG/1
4 TABLET, ORALLY DISINTEGRATING ORAL EVERY 6 HOURS PRN
Status: DISCONTINUED | OUTPATIENT
Start: 2023-11-01 | End: 2023-11-05 | Stop reason: HOSPADM

## 2023-11-01 RX ORDER — SODIUM CHLORIDE, SODIUM LACTATE, POTASSIUM CHLORIDE, CALCIUM CHLORIDE 600; 310; 30; 20 MG/100ML; MG/100ML; MG/100ML; MG/100ML
INJECTION, SOLUTION INTRAVENOUS CONTINUOUS
Status: DISCONTINUED | OUTPATIENT
Start: 2023-11-01 | End: 2023-11-05 | Stop reason: HOSPADM

## 2023-11-01 RX ORDER — AMOXICILLIN 250 MG
1 CAPSULE ORAL 2 TIMES DAILY
Status: DISCONTINUED | OUTPATIENT
Start: 2023-11-01 | End: 2023-11-05 | Stop reason: HOSPADM

## 2023-11-01 RX ORDER — OXYCODONE HYDROCHLORIDE 5 MG/1
5 TABLET ORAL
Status: DISCONTINUED | OUTPATIENT
Start: 2023-11-01 | End: 2023-11-05 | Stop reason: HOSPADM

## 2023-11-01 RX ORDER — AMOXICILLIN 250 MG
2 CAPSULE ORAL 2 TIMES DAILY
Status: DISCONTINUED | OUTPATIENT
Start: 2023-11-01 | End: 2023-11-05 | Stop reason: HOSPADM

## 2023-11-01 RX ADMIN — ACETAMINOPHEN 650 MG: 325 TABLET, FILM COATED ORAL at 22:00

## 2023-11-01 RX ADMIN — LEVOFLOXACIN 750 MG: 750 INJECTION, SOLUTION INTRAVENOUS at 19:41

## 2023-11-01 RX ADMIN — OXYCODONE HYDROCHLORIDE 5 MG: 5 TABLET ORAL at 22:13

## 2023-11-01 RX ADMIN — KETOROLAC TROMETHAMINE 30 MG: 30 INJECTION, SOLUTION INTRAMUSCULAR; INTRAVENOUS at 17:44

## 2023-11-01 RX ADMIN — SODIUM CHLORIDE, POTASSIUM CHLORIDE, SODIUM LACTATE AND CALCIUM CHLORIDE: 600; 310; 30; 20 INJECTION, SOLUTION INTRAVENOUS at 22:04

## 2023-11-01 RX ADMIN — SODIUM CHLORIDE, POTASSIUM CHLORIDE, SODIUM LACTATE AND CALCIUM CHLORIDE 1000 ML: 600; 310; 30; 20 INJECTION, SOLUTION INTRAVENOUS at 17:44

## 2023-11-01 RX ADMIN — IOPAMIDOL 100 ML: 755 INJECTION, SOLUTION INTRAVENOUS at 19:24

## 2023-11-01 RX ADMIN — SENNOSIDES AND DOCUSATE SODIUM 1 TABLET: 50; 8.6 TABLET ORAL at 21:59

## 2023-11-01 ASSESSMENT — ENCOUNTER SYMPTOMS
ABDOMINAL PAIN: 1
PSYCHIATRIC NEGATIVE: 1
DYSURIA: 0
CONSTIPATION: 0
FEVER: 1
HEMATURIA: 0
NAUSEA: 0
HEMATOLOGIC/LYMPHATIC NEGATIVE: 1
CARDIOVASCULAR NEGATIVE: 1
VOMITING: 0
ENDOCRINE NEGATIVE: 1
DIFFICULTY URINATING: 0
DIARRHEA: 0
RESPIRATORY NEGATIVE: 1
ALLERGIC/IMMUNOLOGIC NEGATIVE: 1
EYES NEGATIVE: 1
FLANK PAIN: 1
NEUROLOGICAL NEGATIVE: 1
BLOOD IN STOOL: 0

## 2023-11-01 ASSESSMENT — ACTIVITIES OF DAILY LIVING (ADL)
ADLS_ACUITY_SCORE: 35
ADLS_ACUITY_SCORE: 20
ADLS_ACUITY_SCORE: 35

## 2023-11-01 NOTE — ED PROVIDER NOTES
History     Chief Complaint   Patient presents with    Flank Pain     HPI  Valorie Awad is a 28 year old individual with history of anxiety, major depressive disorder, panic disorder, comes in with right flank and abdominal pain.  Patient states that she started developing severe pain in the right flank and abdomen yesterday.  Has continued.  Now is having fever, pain, malodorous/dark urine.  Denies nausea or vomiting.  States is on birth control and has abnormal menses.  No vaginal bleeding or discharge reported.    Allergies:  Allergies   Allergen Reactions    Bactrim [Sulfamethoxazole W-Trimethoprim] Rash    Morphine Hcl Nausea and Vomiting    Clindamycin Other (See Comments)     Thrush      Nitrofurantoin      Macrobid     Nitrofurantoin Monohydrate Macrocrystals      Macrobid     Loracarbef Rash     Lorabid       Problem List:    Patient Active Problem List    Diagnosis Date Noted    Pyelonephritis, acute 11/01/2023     Priority: Medium    Sepsis (H) 11/01/2023     Priority: Medium    Panic disorder without agoraphobia 10/18/2018     Priority: Medium     Patient is followed by  for ongoing prescription of benzodiazepines.  All refills should be approved by this provider, or covering partner.    Medication(s): Valium 10 mg.   Maximum quantity per month: 30  Clinic visit frequency required: Q 3 months     Controlled substance agreement on file: Yes       Date(s): 9.18.19  Benzodiazepine use reviewed by psychiatry:  Yes       Date(s): 9.18.19    Last [place] website verification:  done on 9.18.19  [email or web address]          Recurrent major depressive disorder, in full remission (H24) 10/18/2018     Priority: Medium    ACP (advance care planning) 03/22/2017     Priority: Medium     Advance Care Planning 7/6/2017: ACP Review of Chart / Resources Provided:  Reviewed chart for advance care plan.  Valorie Awad has no plan or code status on file. Discussed available resources and provided  with information.   Added by KARLY CORBETT                Spasm of muscle 08/12/2015     Priority: Medium    Migraine 10/10/2007     Priority: Medium     Problem list name updated by automated process. Provider to review      Anxiety state 09/20/2001     Priority: Medium     Problem list name updated by automated process. Provider to review          Past Medical History:    Past Medical History:   Diagnosis Date    Abnormal Pap smear     Anxiety 9/20/2001    Marijuana use     Migraine headaches 10/10/2007    Pyelonephritis, acute 11/1/2023    Supervision of other normal pregnancy        Past Surgical History:    Past Surgical History:   Procedure Laterality Date    Comminuted fracture elbow Left 10/25/2013    lauren placed    CYSTOSCOPY      bladder distention    TONSILLECTOMY         Family History:    Family History   Problem Relation Age of Onset    Other - See Comments Father         alcoholism    Depression Father     Mental Illness Father     Depression Mother     Irritable Bowel Syndrome Mother     Mental Illness Mother     Other - See Comments Mother         migraine headache    Other - See Comments Maternal Grandmother         blood disease    Lipids Maternal Grandmother         hyperlipidemia    Thyroid Disease Maternal Grandmother     Diabetes No family hx of     Asthma No family hx of        Social History:  Marital Status:  Single [1]  Social History     Tobacco Use    Smoking status: Never    Smokeless tobacco: Never   Vaping Use    Vaping Use: Never used   Substance Use Topics    Alcohol use: Yes     Comment: social    Drug use: No        Medications:    cyclobenzaprine (FLEXERIL) 10 MG tablet  diazepam (VALIUM) 10 MG tablet  diclofenac (VOLTAREN) 1 % topical gel  diltiazem ER (DILT-XR) 120 MG 24 hr capsule  ENSKYCE 0.15-30 MG-MCG tablet  FLUoxetine (PROZAC) 10 MG capsule  FLUoxetine (PROZAC) 20 MG capsule  hydrOXYzine (ATARAX) 25 MG tablet  naproxen (NAPROSYN) 500 MG tablet  prochlorperazine  (COMPAZINE) 10 MG tablet  SUMAtriptan (IMITREX) 100 MG tablet  valACYclovir (VALTREX) 500 MG tablet          Review of Systems   Constitutional:  Positive for fever.   HENT: Negative.     Eyes: Negative.    Respiratory: Negative.     Cardiovascular: Negative.    Gastrointestinal:  Positive for abdominal pain (Right-sided abdominal pain). Negative for blood in stool, constipation, diarrhea, nausea and vomiting.   Endocrine: Negative.    Genitourinary:  Positive for decreased urine volume, flank pain (Right-sided) and pelvic pain. Negative for difficulty urinating, dysuria, hematuria, vaginal bleeding, vaginal discharge and vaginal pain.   Skin: Negative.    Allergic/Immunologic: Negative.    Neurological: Negative.    Hematological: Negative.    Psychiatric/Behavioral: Negative.         Physical Exam   BP: 156/80  Pulse: 117  Temp: (!) 100.7  F (38.2  C)  Resp: 24  Weight: 101.1 kg (222 lb 15.9 oz)  SpO2: 96 %      GENERAL APPEARANCE:  The patient is a 28 year old well-developed, well-nourished individual who is in acute distress upon arrival.  LUNGS:  Breathing is easy.  Breath sounds are equal and clear bilaterally.  No wheezes, rhonchi, or rales.  HEART: Tachycardic rate with regular rhythm with normal S1 and S2.  No murmurs, gallops, or rubs.  ABDOMEN:  Soft and rotund.  No mass or rebound.  Negative Rovsing sign.  Tenderness and guarding to palpation to right upper and lower quadrants.  No organomegaly or hernia.  Bowel sounds are present.  Right CVA tenderness to palpation but no flank mass.  No abdominal bruits or thrills present upon auscultation/palpation.  GENITOURINARY: Anterior pelvic tenderness to palpation is present.  No hernia or mass present.  NEUROLOGIC:  No focal sensory or motor deficits are noted.    PSYCHIATRIC:  The patient is awake, alert, and oriented x4.  Recent and remote memory is intact.  Appropriate mood and affect.  Cooperative with history and physical exam.  SKIN:  Warm, dry, and well  perfused.  Good turgor.  No lesions, nodules, or rashes are noted.  No bruising noted.      Comment: Discrepancies between my note and notes on behalf of the nursing team or other care providers are secondary to my findings reflecting my physical examination and questioning of the patient.  Any conflicting information provided is not in line with my examination of the patient.       ED Course              ED Course as of 11/01/23 2013 Wed Nov 01, 2023   1732 Labs ordered.   1734 In to see patient and history/physical completed.    1738 IV 1 L LR and ketorolac 30 mg ordered.   1807 WBC(!): 25.6  WBC 25.6.  Lactic acid, blood cultures, and procalcitonin ordered.  Awaiting urine.     1920 Levofloxacin 750 mg IV ordered for UTI with elevated white count and procalcitonin.   1943 CT negative for stone but does show right-sided pyelonephritis   1956 With white count of 25.6 and procalcitonin of 0.5 and noted UTI, patient likely has urosepsis with pyelonephritis.  Discussed case with Hospitalist Dr. Gant who accepted patient for [facility] admission.                 Results for orders placed or performed during the hospital encounter of 11/01/23 (from the past 24 hour(s))   CBC with platelets differential    Narrative    The following orders were created for panel order CBC with platelets differential.  Procedure                               Abnormality         Status                     ---------                               -----------         ------                     CBC with platelets and d...[[id number]]  Abnormal            Final result                 Please view results for these tests on the individual orders.   Comprehensive metabolic panel   Result Value Ref Range    Sodium 135 135 - 145 mmol/L    Potassium 3.4 3.4 - 5.3 mmol/L    Carbon Dioxide (CO2) 23 22 - 29 mmol/L    Anion Gap 12 7 - 15 mmol/L    Urea Nitrogen 11.1 6.0 - 20.0 mg/dL    Creatinine 0.89 0.51 - 0.95 mg/dL    GFR Estimate 90 >60  mL/min/1.73m2    Calcium 9.4 8.6 - 10.0 mg/dL    Chloride 100 98 - 107 mmol/L    Glucose 130 (H) 70 - 99 mg/dL    Alkaline Phosphatase 88 35 - 104 U/L    AST 14 0 - 45 U/L    ALT 13 0 - 50 U/L    Protein Total 6.5 6.4 - 8.3 g/dL    Albumin 3.6 3.5 - 5.2 g/dL    Bilirubin Total 0.3 <=1.2 mg/dL   Lipase   Result Value Ref Range    Lipase 19 13 - 60 U/L   CBC with platelets and differential   Result Value Ref Range    WBC Count 25.6 (H) 4.0 - 11.0 10e3/uL    RBC Count 4.00 3.80 - 5.20 10e6/uL    Hemoglobin 11.9 11.7 - 15.7 g/dL    Hematocrit 34.5 (L) 35.0 - 47.0 %    MCV 86 78 - 100 fL    MCH 29.8 26.5 - 33.0 pg    MCHC 34.5 31.5 - 36.5 g/dL    RDW 12.3 10.0 - 15.0 %    Platelet Count 246 150 - 450 10e3/uL    % Neutrophils 86 %    % Lymphocytes 7 %    % Monocytes 6 %    % Eosinophils 0 %    % Basophils 0 %    % Immature Granulocytes 1 %    NRBCs per 100 WBC 0 <1 /100    Absolute Neutrophils 21.9 (H) 1.6 - 8.3 10e3/uL    Absolute Lymphocytes 1.8 0.8 - 5.3 10e3/uL    Absolute Monocytes 1.6 (H) 0.0 - 1.3 10e3/uL    Absolute Eosinophils 0.1 0.0 - 0.7 10e3/uL    Absolute Basophils 0.1 0.0 - 0.2 10e3/uL    Absolute Immature Granulocytes 0.2 <=0.4 10e3/uL    Absolute NRBCs 0.0 10e3/uL   Procalcitonin   Result Value Ref Range    Procalcitonin 0.50 (H) <0.05 ng/mL   Lactic acid whole blood   Result Value Ref Range    Lactic Acid 1.1 0.7 - 2.0 mmol/L   UA with Microscopic reflex to Culture    Specimen: Urine, Midstream   Result Value Ref Range    Color Urine Dark Yellow (A) Colorless, Straw, Light Yellow, Yellow    Appearance Urine Cloudy (A) Clear    Glucose Urine Negative Negative mg/dL    Bilirubin Urine Negative Negative    Ketones Urine Trace (A) Negative mg/dL    Specific Gravity Urine 1.024 1.003 - 1.035    Blood Urine Large (A) Negative    pH Urine 6.5 4.7 - 8.0    Protein Albumin Urine 200 (A) Negative mg/dL    Urobilinogen Urine Normal Normal, 2.0 mg/dL    Nitrite Urine Negative Negative    Leukocyte Esterase Urine  Large (A) Negative    Bacteria Urine Few (A) None Seen /HPF    WBC Clumps Urine Present (A) None Seen /HPF    Budding Yeast Urine Few (A) None Seen /HPF    Mucus Urine Present (A) None Seen /LPF    Amorphous Crystals Urine Moderate (A) None Seen /HPF    RBC Urine 42 (H) <=2 /HPF    WBC Urine >182 (H) <=5 /HPF    Squamous Epithelials Urine 22 (H) <=1 /HPF    Hyaline Casts Urine 7 (H) <=2 /LPF   HCG qualitative urine (UPT)   Result Value Ref Range    hCG Urine Qualitative Negative Negative   CT Abdomen Pelvis w Contrast    Narrative    EXAMINATION: CT ABDOMEN PELVIS W CONTRAST, 11/1/2023 7:32 PM    TECHNIQUE:  Helical CT images from the lung bases through the  symphysis pubis were obtained  with IV contrast. Contrast dose:    COMPARISON: 2021    HISTORY: Right flank and abdominal pain    FINDINGS:    There is dependent atelectasis at the lung bases.    The liver is free of masses or biliary ductal enlargement. No  calcified gallstones are seen.    The the spleen and pancreas appear normal.    The adrenal glands are normal.    The right kidney is swollen with mild cortical enhancement and  abnormal thickening of the walls of the renal pelvis and proximal left  ureter. There are no renal masses. No renal or ureteric calculi are  seen.    There are mildly enlarged periaortic and pericaval lymph nodes at the  level of the renal jeannine.    No intraperitoneal masses or inflammatory changes are noted.    In the pelvis the bladder and rectum appear normal.    Degenerative changes are seen in L5-S1.      Impression    IMPRESSION: Findings most consistent with acute pyelonephritis  involving the right kidney.     YOHAN MADDOX MD         SYSTEM ID:  [id number]       Medications   levofloxacin (LEVAQUIN) infusion 750 mg (750 mg Intravenous $New Bag 11/1/23 1941)   lactated ringers BOLUS 1,000 mL (0 mLs Intravenous Stopped 11/1/23 1849)   ketorolac (TORADOL) injection 30 mg (30 mg Intravenous $Given 11/1/23 1744)   iopamidol  (ISOVUE-370) solution 109 mL (100 mLs Intravenous $Given 11/1/23 1924)   sodium chloride (PF) 0.9% PF flush 60 mL (50 mLs Intravenous $Given 11/1/23 1925)       Assessments & Plan (with Medical Decision Making)     I have reviewed the nursing notes.    I have reviewed the findings, diagnosis, plan and need for follow up with the patient.      Summary:  Patient presents to the ER today for abdominal/flank pain.  Potential diagnosis which have been considered and evaluated include UTI, ureteral stone, pyelonephritis, appendicitis, cholecystitis, as well as others. Many of these have been excluded using the various modalities and assessment as noted on the chart. At the present time, the diagnosis given seems to be the most likely pyelonephritis causing sepsis.  Upon arrival, vitals signs show blood pressure 156/80 with a pulse of 117.  Temperature 38.2  C.  Respirations 24 with oxygenation 96% on room air.  The patient is alert but does appear to be in distress upon arrival.  Patient does have abdominal guarding.  Respiratory examination normal.  Patient does have tachycardia upon auscultation.  No other cardiac abnormalities.  IV established and 1 L LR given.  Ketorolac 30 mg given for pain with improvement.  Lab work obtained showing WBC of 25.6 with hemoglobin of 11.9.  For this reason sepsis protocol was initiated and lactic acid drawn showing level of 1.1 but procalcitonin elevated at 0.50.  Blood cultures obtained and are pending.  Electrolytes, renal, hepatic functions normal with a lipase of 19.  UA shows large leukocyte esterase with WBC clumps, 40 to RBC's, greater than 182 WBC's but does have 22 squamous epithelial cells.  Urine was sent for culture.  Pregnancy negative.  CT abdomen pelvis with IV contrast was conducted showing right-sided pyelonephritis.  With patient allergy list, did initiate antibiotics consisting of levofloxacin 750 mg.  With patient having fever, tachycardia, elevated white count,  elevated procalcitonin, patient likely has pyelonephritis with sepsis.  Discussed case with Hospitalist Dr. Gant who accepted patient for [facility] admission.        Critical Care Time: None    Impression and plan discussed with patient. Questions answered, concerns addressed, indications for urgent re-evaluation reviewed, and  given. Patient/Parent/Caregiver agree with treatment plan and have no further questions at this time.      This note was created by the Dragon Voice Dictation System. Inadvertent typographical errors, due to software recognition problems, may still exist.             New Prescriptions    No medications on file       Final diagnoses:   Pyelonephritis, acute   Sepsis (H)       11/1/2023   [facility]       Herve Molina APRN CNP  11/01/23 2013

## 2023-11-01 NOTE — ED TRIAGE NOTES
"Pt presents with right flank pain radiating into the right groin. She reports \"I think I'm passing a kidney stone.\" She reports that she has a fever 103.5. Pt reports dark odorous urine. Hx of kidney stones and infections. Pt took tylenol prior to arrival.        "

## 2023-11-02 LAB
ANION GAP SERPL CALCULATED.3IONS-SCNC: 12 MMOL/L (ref 7–15)
BASOPHILS # BLD AUTO: 0.1 10E3/UL (ref 0–0.2)
BASOPHILS NFR BLD AUTO: 0 %
BUN SERPL-MCNC: 10.6 MG/DL (ref 6–20)
CALCIUM SERPL-MCNC: 8.8 MG/DL (ref 8.6–10)
CHLORIDE SERPL-SCNC: 99 MMOL/L (ref 98–107)
CREAT SERPL-MCNC: 0.88 MG/DL (ref 0.51–0.95)
DEPRECATED HCO3 PLAS-SCNC: 25 MMOL/L (ref 22–29)
EGFRCR SERPLBLD CKD-EPI 2021: >90 ML/MIN/1.73M2
EOSINOPHIL # BLD AUTO: 0.1 10E3/UL (ref 0–0.7)
EOSINOPHIL NFR BLD AUTO: 1 %
ERYTHROCYTE [DISTWIDTH] IN BLOOD BY AUTOMATED COUNT: 12.3 % (ref 10–15)
GLUCOSE SERPL-MCNC: 115 MG/DL (ref 70–99)
HCT VFR BLD AUTO: 31.5 % (ref 35–47)
HGB BLD-MCNC: 10.8 G/DL (ref 11.7–15.7)
IMM GRANULOCYTES # BLD: 0.1 10E3/UL
IMM GRANULOCYTES NFR BLD: 1 %
LACTATE SERPL-SCNC: 1.5 MMOL/L (ref 0.7–2)
LYMPHOCYTES # BLD AUTO: 1.7 10E3/UL (ref 0.8–5.3)
LYMPHOCYTES NFR BLD AUTO: 9 %
MCH RBC QN AUTO: 30.1 PG (ref 26.5–33)
MCHC RBC AUTO-ENTMCNC: 34.3 G/DL (ref 31.5–36.5)
MCV RBC AUTO: 88 FL (ref 78–100)
MONOCYTES # BLD AUTO: 1 10E3/UL (ref 0–1.3)
MONOCYTES NFR BLD AUTO: 5 %
NEUTROPHILS # BLD AUTO: 16.4 10E3/UL (ref 1.6–8.3)
NEUTROPHILS NFR BLD AUTO: 84 %
NRBC # BLD AUTO: 0 10E3/UL
NRBC BLD AUTO-RTO: 0 /100
PLATELET # BLD AUTO: 191 10E3/UL (ref 150–450)
POTASSIUM SERPL-SCNC: 3.3 MMOL/L (ref 3.4–5.3)
RBC # BLD AUTO: 3.59 10E6/UL (ref 3.8–5.2)
SODIUM SERPL-SCNC: 136 MMOL/L (ref 135–145)
WBC # BLD AUTO: 19.4 10E3/UL (ref 4–11)

## 2023-11-02 PROCEDURE — 36415 COLL VENOUS BLD VENIPUNCTURE: CPT | Performed by: HOSPITALIST

## 2023-11-02 PROCEDURE — 99232 SBSQ HOSP IP/OBS MODERATE 35: CPT | Performed by: INTERNAL MEDICINE

## 2023-11-02 PROCEDURE — 80048 BASIC METABOLIC PNL TOTAL CA: CPT | Performed by: HOSPITALIST

## 2023-11-02 PROCEDURE — 250N000011 HC RX IP 250 OP 636: Mod: JZ | Performed by: INTERNAL MEDICINE

## 2023-11-02 PROCEDURE — 250N000011 HC RX IP 250 OP 636: Performed by: HOSPITALIST

## 2023-11-02 PROCEDURE — 250N000013 HC RX MED GY IP 250 OP 250 PS 637: Performed by: INTERNAL MEDICINE

## 2023-11-02 PROCEDURE — 250N000013 HC RX MED GY IP 250 OP 250 PS 637: Performed by: HOSPITALIST

## 2023-11-02 PROCEDURE — 120N000001 HC R&B MED SURG/OB

## 2023-11-02 PROCEDURE — 258N000003 HC RX IP 258 OP 636: Performed by: HOSPITALIST

## 2023-11-02 PROCEDURE — 36415 COLL VENOUS BLD VENIPUNCTURE: CPT | Performed by: INTERNAL MEDICINE

## 2023-11-02 PROCEDURE — 85025 COMPLETE CBC W/AUTO DIFF WBC: CPT | Performed by: HOSPITALIST

## 2023-11-02 PROCEDURE — 83605 ASSAY OF LACTIC ACID: CPT | Performed by: INTERNAL MEDICINE

## 2023-11-02 RX ORDER — VALACYCLOVIR HYDROCHLORIDE 500 MG/1
500 TABLET, FILM COATED ORAL DAILY PRN
COMMUNITY

## 2023-11-02 RX ORDER — SUMATRIPTAN 50 MG/1
100 TABLET, FILM COATED ORAL
Status: DISCONTINUED | OUTPATIENT
Start: 2023-11-02 | End: 2023-11-05 | Stop reason: HOSPADM

## 2023-11-02 RX ORDER — POTASSIUM CHLORIDE 1500 MG/1
40 TABLET, EXTENDED RELEASE ORAL ONCE
Status: COMPLETED | OUTPATIENT
Start: 2023-11-02 | End: 2023-11-02

## 2023-11-02 RX ORDER — KETOROLAC TROMETHAMINE 15 MG/ML
15 INJECTION, SOLUTION INTRAMUSCULAR; INTRAVENOUS EVERY 6 HOURS PRN
Status: DISCONTINUED | OUTPATIENT
Start: 2023-11-02 | End: 2023-11-05 | Stop reason: HOSPADM

## 2023-11-02 RX ORDER — NAPROXEN 500 MG/1
500 TABLET ORAL 2 TIMES DAILY PRN
COMMUNITY

## 2023-11-02 RX ORDER — KETOROLAC TROMETHAMINE 30 MG/ML
30 INJECTION, SOLUTION INTRAMUSCULAR; INTRAVENOUS ONCE
Status: COMPLETED | OUTPATIENT
Start: 2023-11-02 | End: 2023-11-02

## 2023-11-02 RX ORDER — DILTIAZEM HYDROCHLORIDE 120 MG/1
120 CAPSULE, COATED, EXTENDED RELEASE ORAL DAILY
Status: DISCONTINUED | OUTPATIENT
Start: 2023-11-02 | End: 2023-11-05 | Stop reason: HOSPADM

## 2023-11-02 RX ADMIN — ACETAMINOPHEN 650 MG: 325 TABLET, FILM COATED ORAL at 12:16

## 2023-11-02 RX ADMIN — DILTIAZEM HYDROCHLORIDE 120 MG: 120 CAPSULE, COATED, EXTENDED RELEASE ORAL at 15:48

## 2023-11-02 RX ADMIN — SODIUM CHLORIDE, POTASSIUM CHLORIDE, SODIUM LACTATE AND CALCIUM CHLORIDE: 600; 310; 30; 20 INJECTION, SOLUTION INTRAVENOUS at 08:40

## 2023-11-02 RX ADMIN — ONDANSETRON 4 MG: 2 INJECTION INTRAMUSCULAR; INTRAVENOUS at 20:03

## 2023-11-02 RX ADMIN — FLUOXETINE 10 MG: 10 CAPSULE ORAL at 09:06

## 2023-11-02 RX ADMIN — KETOROLAC TROMETHAMINE 15 MG: 15 INJECTION, SOLUTION INTRAMUSCULAR; INTRAVENOUS at 21:30

## 2023-11-02 RX ADMIN — FLUOXETINE 20 MG: 20 CAPSULE ORAL at 09:04

## 2023-11-02 RX ADMIN — KETOROLAC TROMETHAMINE 30 MG: 30 INJECTION, SOLUTION INTRAMUSCULAR; INTRAVENOUS at 12:05

## 2023-11-02 RX ADMIN — POTASSIUM CHLORIDE 40 MEQ: 1500 TABLET, EXTENDED RELEASE ORAL at 19:44

## 2023-11-02 RX ADMIN — LEVOFLOXACIN 750 MG: 750 INJECTION, SOLUTION INTRAVENOUS at 19:04

## 2023-11-02 RX ADMIN — OXYCODONE HYDROCHLORIDE 5 MG: 5 TABLET ORAL at 06:37

## 2023-11-02 RX ADMIN — ACETAMINOPHEN 650 MG: 325 TABLET, FILM COATED ORAL at 19:05

## 2023-11-02 RX ADMIN — SENNOSIDES AND DOCUSATE SODIUM 1 TABLET: 50; 8.6 TABLET ORAL at 20:03

## 2023-11-02 RX ADMIN — SENNOSIDES AND DOCUSATE SODIUM 1 TABLET: 50; 8.6 TABLET ORAL at 09:03

## 2023-11-02 RX ADMIN — ACETAMINOPHEN 650 MG: 325 TABLET, FILM COATED ORAL at 05:24

## 2023-11-02 RX ADMIN — SUMATRIPTAN SUCCINATE 100 MG: 50 TABLET ORAL at 19:05

## 2023-11-02 ASSESSMENT — ACTIVITIES OF DAILY LIVING (ADL)
ADLS_ACUITY_SCORE: 20

## 2023-11-02 NOTE — PLAN OF CARE
"Reason for hospital stay:  Sepsis, pyelonephritis   Living situation PTA: Home  Most recent vitals: /62 (BP Location: Right arm, Patient Position: Semi-Marion's, Cuff Size: Adult Regular)   Pulse 110   Temp 99.7  F (37.6  C) (Tympanic)   Resp 26   Ht 1.702 m (5' 7\")   Wt 101.4 kg (223 lb 8.7 oz)   SpO2 95%   BMI 35.01 kg/m      Pain Management:  Patient reported pain 4-8 this shift. Administered PRN tylenol and PRN oxycodone. Upon reassessment, patient reported adequate relief.  LOC:  A&O x4  Cardiac:  Apical pulse regular, tachycardic.   Respiratory:  Lung sounds clear and equal bilaterally, maintaining sats on room air.  GI:  Bowel sounds audible and normoactive  :  Flank pain, voiding spontaneously without difficulty  Skin Issues:  None    IVF:   mL/hr  ABX:  None    Nutrition: Regular  Activity: Ambulates to bathroom with a stand by assist  Safety:  Call light within reach, calls appropriately, makes needs known, lighting adjusted, nonskid footwear in place.    Face to face report given with opportunity to observe patient.    Report given to ETHAN Campbell RN   11/2/2023  7:20 AM    "

## 2023-11-02 NOTE — PROVIDER NOTIFICATION
DATE:  November 2, 2023   TIME OF NOTIFICATION:  9:03 AM   NOTIFICATION DETAILS: Patient having extreme migraine, looks like in the past she has been in to the ER and she reports whatever they give her in the ER seems to work the best. Looking back the only thing I could see that was consecutively given for her migraines in the ER is Toradol. Can you take a look she can't have tylenol yet and her imitrex only works prior to migraine.  NOTIFICATION INTERVENTIONS:  Toradol 30 mg once ordered  NOTIFICAITON GIVEN TO (PROVIDER):  Michele Henderson RN on November 2, 2023 at 10:00 AM

## 2023-11-02 NOTE — H&P
[facility]    History and Physical - Hospitalist Service       Date of Admission:  11/1/2023    Assessment & Plan      Valorie Awad is a 28 year old female admitted on 11/1/2023 with sepsis due to right pyelonephritis.     #Sepsis due to acute right-sided pyelonephritis  #Acute right-sided pyelonephritis  - 28 year old female presented to the hospital complaining of right flank pain, abdominal pain which started yesterday.    -Patient also had a fever of 103 at home.    -On admission patient met SIRS criteria with tachycardia heart rate of 117, fever of 100.7, leukocytosis with WBC elevated at 25.6,  -Lactic acid 1.1  - UA positive for UTI with WBC more than 182, leukocyte esterase large,   CT abdomen and pelvis reported Findings most consistent with acute pyelonephritis involving the right kidney.   -Follow urine culture and blood culture  - Started on IV fluid, IV antibiotic with Levaquin.    -She has multiple allergies including Bactrim, clindamycin, loracarbef.      #Anxiety disorder  #Depression  -Resume home medications        Diet:  Regular  DVT Prophylaxis: Enoxaparin (Lovenox) SQ  Saavedra Catheter: Not present  Lines: None     Cardiac Monitoring: None  Code Status:  full    Clinically Significant Risk Factors Present on Admission                                  Disposition Plan      Expected Discharge Date: 11/03/2023                  Shai Gant MD  Hospitalist Service  [facility]  Securely message with [email or web address] (more info)  Text page via [facility] Paging/Directory     ______________________________________________________________________    Chief Complaint   Right flank pain, fever    History is obtained from the patient    History of Present Illness   Valorie Awad is a 28 year old female with established diagnosis of anxiety, depression who presented to the hospital complaining of right flank pain, abdominal pain which started yesterday.  Patient also had a fever of 103 at home.   She denies diarrhea, constipation, headache    On admission patient met SIRS criteria with tachycardia heart rate of 117, fever of 100.7, leukocytosis with WBC elevated at 25.6, UA positive for UTI with WBC more than 182, leukocyte esterase large, CT abdomen and pelvis reported Findings most consistent with acute pyelonephritis involving the right kidney.     Patient assessed as having sepsis due to acute pyelonephritis.  Started on IV fluid, IV antibiotic with Levaquin.  She has multiple allergies including Bactrim, clindamycin, loracarbef.      Past Medical History    Past Medical History:   Diagnosis Date    Abnormal Pap smear     Anxiety 9/20/2001    Marijuana use     Migraine headaches 10/10/2007    Pyelonephritis, acute 11/1/2023    Supervision of other normal pregnancy        Past Surgical History   Past Surgical History:   Procedure Laterality Date    Comminuted fracture elbow Left 10/25/2013    lauren placed    CYSTOSCOPY      bladder distention    TONSILLECTOMY         Family History:    Family History[]Expand by Default         Family History   Problem Relation Age of Onset    Other - See Comments Father           alcoholism    Depression Father      Mental Illness Father      Depression Mother      Irritable Bowel Syndrome Mother      Mental Illness Mother      Other - See Comments Mother           migraine headache    Other - See Comments Maternal Grandmother           blood disease    Lipids Maternal Grandmother           hyperlipidemia    Thyroid Disease Maternal Grandmother      Diabetes No family hx of      Asthma No family hx of            Allergies:        Allergies   Allergen Reactions    Bactrim [Sulfamethoxazole W-Trimethoprim] Rash    Morphine Hcl Nausea and Vomiting    Clindamycin Other (See Comments)       Thrush       Nitrofurantoin         Macrobid     Nitrofurantoin Monohydrate Macrocrystals         Macrobid     Loracarbef Rash       Lorabid       Prior to Admission Medications   Prior to  Admission Medications   Prescriptions Last Dose Informant Patient Reported? Taking?   ENSKYCE 0.15-30 MG-MCG tablet   No No   Sig: TAKE 1 TABLET BY MOUTH DAILY   FLUoxetine (PROZAC) 10 MG capsule   No No   Sig: TAKE 1 CAPSULE BY MOUTH DAILY WITH 20 MG IN THE MORNING   FLUoxetine (PROZAC) 20 MG capsule   No No   Sig: Take 1 capsule (20 mg) by mouth daily Along with 10 mg   SUMAtriptan (IMITREX) 100 MG tablet   No No   Sig: Take 1 tablet (100 mg) by mouth at onset of headache for migraine May repeat in 2 hours if needed: max 2/day; average number of headaches monthly   cyclobenzaprine (FLEXERIL) 10 MG tablet   No No   Sig: Take 1 tablet (10 mg) by mouth 3 times daily as needed for muscle spasms   diazepam (VALIUM) 10 MG tablet   No No   Sig: Take 1 tablet (10 mg) by mouth daily as needed for anxiety   diclofenac (VOLTAREN) 1 % topical gel   No No   Sig: Apply 4 g topically 4 times daily   diltiazem ER (DILT-XR) 120 MG 24 hr capsule   No No   Sig: Take 1 capsule (120 mg) by mouth daily   hydrOXYzine (ATARAX) 25 MG tablet   No No   Sig: Take 1 - 2 tablets up to 2 times daily as needed for anxiety   naproxen (NAPROSYN) 500 MG tablet   No No   Sig: TAKE 1 TABLET (500 MG) BY MOUTH TWICE DAILY WITH MEALS   prochlorperazine (COMPAZINE) 10 MG tablet   No No   Sig: Take 0.5-1 tablets (5-10 mg) by mouth every 8 hours as needed for nausea, vomiting or other (migraine headache)   valACYclovir (VALTREX) 500 MG tablet   No No   Sig: Take 1 tablet (500 mg) by mouth daily      Facility-Administered Medications: None        Review of Systems    The 10 point Review of Systems is negative other than noted in the HPI      Physical Exam   Vital Signs: Temp: (!) 100.7  F (38.2  C) Temp src: Tympanic BP: 156/80 Pulse: 117   Resp: 24 SpO2: 96 % O2 Device: None (Room air)    Weight: 222 lbs 15.92 oz    GENERAL APPEARANCE:  The patient is a 28 year old well-developed, well-nourished individual who is in acute distress due to pain on the right  flank  LUNGS:  Breathing is easy.  Breath sounds are equal and clear bilaterally.  No wheezes, rhonchi, or rales.  HEART: Tachycardic rate with regular rhythm with normal S1 and S2.  No murmurs, gallops, or rubs.  ABDOMEN:  Soft and rotund.  No mass or rebound.  Negative Rovsing sign.  Tenderness and guarding to palpation to right upper and lower quadrants.  No organomegaly or hernia.  Bowel sounds are present.  Right CVA tenderness to palpation but no flank mass.  No abdominal bruits or thrills present upon auscultation/palpation.  GENITOURINARY: Anterior pelvic tenderness to palpation is present.  No hernia or mass present.  NEUROLOGIC:  No focal sensory or motor deficits are noted.    PSYCHIATRIC:  The patient is awake, alert, and oriented x4.  Recent and remote memory is intact.  Appropriate mood and affect.  Cooperative with history and physical exam.  SKIN:  Warm, dry, and well perfused.  Good turgor.  No lesions, nodules, or rashes are noted.  No bruising noted.        Medical Decision Making       79 MINUTES SPENT BY ME on the date of service doing chart review, history, exam, documentation & further activities per the note.      Data
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
## 2023-11-02 NOTE — PROGRESS NOTES
"[facility]  Hospitalist Progress Note          Assessment and Plan:     Assessment & Plan  Valorie Awad is a 28 year old female admitted on 11/1/2023 with sepsis due to right pyelonephritis.      #Sepsis due to acute right-sided pyelonephritis  #Acute right-sided pyelonephritis  - 28 year old female presented to the hospital complaining of right flank pain, abdominal pain which started yesterday.    -Patient also had a fever of 103 at home.    -On admission patient met SIRS criteria with tachycardia heart rate of 117, fever of 100.7, leukocytosis with WBC elevated at 25.6,  -Lactic acid 1.1  - UA positive for UTI with WBC more than 182, leukocyte esterase large,   CT abdomen and pelvis reported Findings most consistent with acute pyelonephritis involving the right kidney.   -Follow urine culture and blood culture  - Started on IV fluid, IV antibiotic with Levaquin.    -She has multiple allergies including Bactrim, clindamycin, loracarbef.  -11/2: White count down from 25-19.  Remains on Levaquin.  Patient is afebrile this morning.        #Anxiety disorder  #Depression  -Resume home medications        Diet:  Regular  DVT Prophylaxis: Enoxaparin (Lovenox) SQ  Saavedra Catheter: Not present  Lines: None     Cardiac Monitoring: None  Code Status:  full  Dispo: 1-2 days          Clinically Significant Risk Factors Present on Admission        # Hypokalemia: Lowest K = 3.3 mmol/L in last 2 days, will replace as needed                # Obesity: Estimated body mass index is 35.01 kg/m  as calculated from the following:    Height as of this encounter: 1.702 m (5' 7\").    Weight as of this encounter: 101.4 kg (223 lb 8.7 oz).                    Interval History:     Patient remained stable.  She reports ongoing right flank and back pain.  She also reports some nausea however no vomiting.  She is afebrile this morning.              Medications:      enoxaparin ANTICOAGULANT  40 mg Subcutaneous Q24H    FLUoxetine  10 " "mg Oral Daily    FLUoxetine  20 mg Oral Daily    levofloxacin  750 mg Intravenous Q24H    senna-docusate  1 tablet Oral BID    Or    senna-docusate  2 tablet Oral BID                  Physical Exam:     Vitals:    23 2133 23 0031 23 0527 23 0639   BP: 125/52  122/62    BP Location: Left arm  Right arm    Patient Position: Semi-Marion's  Semi-Marion's    Cuff Size: Adult Regular  Adult Regular    Pulse: 110  110    Resp: 24  26    Temp: (!) 100.9  F (38.3  C) 99.4  F (37.4  C) (!) 101.9  F (38.8  C) 99.7  F (37.6  C)   TempSrc: Tympanic  Tympanic Tympanic   SpO2: 97%  95%    Weight: 101.4 kg (223 lb 8.7 oz)      Height: 1.702 m (5' 7\")            Vital Sign Ranges  Temperature Temp  Av.1  F (37.8  C)  Min: 98.2  F (36.8  C)  Max: 101.9  F (38.8  C)   Blood pressure Systolic (24hrs), Av , Min:95 , Max:156        Diastolic (24hrs), Av, Min:52, Max:80      Pulse Pulse  Av.3  Min: 87  Max: 117   Respirations Resp  Av.2  Min: 16  Max: 26   Pulse oximetry SpO2  Av.8 %  Min: 95 %  Max: 97 %         Intake/Output Summary (Last 24 hours) at 2023 0844  Last data filed at 2023 0539  Gross per 24 hour   Intake 758.33 ml   Output 700 ml   Net 58.33 ml       General:  Alert and Orientated.  NAD.  Heart:  RRR, S1 S2, No murmurs, no rubs, no gallops.  Resp: CTA bilaterally.  No wheezes, no rhonchi, no crackles.  Abd: Soft/NT/ND/Positve BS. + right flank/back tenderness.  Ext: No edema noted in either lower extremity  Neuro:  No focal Neurologic deficits noted.    Peripheral IV 23 Anterior;Left Lower forearm (Active)   Site Assessment WDL 23 0520   Line Status Infusing 23   Dressing Transparent 23   Dressing Status clean;dry;intact 23   Line Intervention Flushed 23   Phlebitis Scale 0-->no symptoms 23   Infiltration? no 23   Number of days: 1     No line/device             Data:     Lab Results "   Component Value Date    WBC 19.4 (H) 11/02/2023    WBC 25.6 (H) 11/01/2023    WBC 12.5 (H) 11/01/2022    HGB 10.8 (L) 11/02/2023    HGB 11.9 11/01/2023    HGB 13.4 11/01/2022    HCT 31.5 (L) 11/02/2023    HCT 34.5 (L) 11/01/2023    HCT 38.5 11/01/2022     11/02/2023     11/01/2023     11/01/2022     11/02/2023     11/01/2023     11/01/2022    POTASSIUM 3.3 (L) 11/02/2023    POTASSIUM 3.4 11/01/2023    POTASSIUM 3.8 11/01/2022    CHLORIDE 99 11/02/2023    CHLORIDE 100 11/01/2023    CHLORIDE 105 11/01/2022    CO2 25 11/02/2023    CO2 23 11/01/2023    CO2 26 11/01/2022    BUN 10.6 11/02/2023    BUN 11.1 11/01/2023    BUN 22.7 (H) 11/01/2022    CR 0.88 11/02/2023    CR 0.89 11/01/2023    CR 0.73 11/01/2022     (H) 11/02/2023     (H) 11/01/2023    GLC 90 11/01/2022    AST 14 11/01/2023    AST 15 05/27/2022    AST 22 07/19/2018    ALT 13 11/01/2023    ALT 20 05/27/2022    ALT 32 07/19/2018    ALKPHOS 88 11/01/2023    ALKPHOS 61 05/27/2022    ALKPHOS 90 07/19/2018    BILITOTAL 0.3 11/01/2023    BILITOTAL 0.3 05/27/2022    BILITOTAL 0.5 07/19/2018     Lactic Acid   Date Value Ref Range Status   11/01/2023 1.1 0.7 - 2.0 mmol/L Final   07/21/2021 1.3 0.7 - 2.0 mmol/L Final       Ry Bautista DO

## 2023-11-02 NOTE — PLAN OF CARE
[facility] Inpatient Admission Note:    Patient admitted to 3228/3228-1 at approximately 2145 via wheel chair accompanied by transport tech from emergency room . Report received from ETHAN Holliday in SBAR format at 2130 via telephone. Patient transferred to bed via self.. Patient is alert and oriented X 3, reports pain; rates at 6 on 0-10 scale.  Patient oriented to room, unit, hourly rounding, and plan of care. Explained admission packet and patient handbook with patient bill of rights brochure. Will continue to monitor and document as needed.     Inpatient Nursing criteria listed below was met:    Health care directives status obtained and documented: Yes    Patient identifies a surrogate decision maker: No      If initial lactic acid greater than 2.0, repeat lactic acid drawn within one hour of arrival to unit: NA. If no, state reason: Lactic 1.1    Clergy visit ordered if patient requests: No, declines    Skin issues/needs documented: N/A    Isolation Patient: yes Education given, correct sign in place and documentation row added to PCS:  Yes    Fall Prevention Yes: Care plan updated, education given and documented, sticker and magnet in place: Yes    Care Plan initiated: Yes    Education Documented (including assessment): Yes    Patient has discharge needs :  Unsure at this time

## 2023-11-02 NOTE — PLAN OF CARE
"Reason for hospital stay:  Sepsis with pyelonephritis   Living situation PTA: Home  Most recent vitals: /65 (BP Location: Right arm, Patient Position: Semi-Marion's, Cuff Size: Adult Regular)   Pulse 97   Temp 99.4  F (37.4  C) (Tympanic)   Resp 24   Ht 1.702 m (5' 7\")   Wt 101.4 kg (223 lb 8.7 oz)   SpO2 93%   BMI 35.01 kg/m      Pain Management:  3/10 pain with flank pain in only her back. No additional pain meds needed. One time order for Toredol given for 10/10 for a migraine. Effective. Home dose Imitrex added for start of migraine. Given x1.  LOC:  A&O   Resp: Tachy  :  Tea color urine still  IVF:  Infusing  Safety:  A&O steady on feet. Call light within reach. Bed in low positions. Using call appropriately.     Comments:Spiked fever x1 101.4. Tylenol effective to bring down. Pt flagged for sepsis. Lactic WNL. Both sets of blood cultures drawn came back positive. See lab results.      Face to face report given with opportunity to observe patient.    Report given to ETHAN Law RN   11/2/2023  7:21 PM       "

## 2023-11-02 NOTE — PROVIDER NOTIFICATION
Dr. Gant notified patient has a headache coming on, normally takes imitrex at home.  This is on her PTA med list, is it possible to get this ordered?    Provider put in for home dose Imitrex.    Shannan Lomeli RN on 11/2/2023 at 6:58 PM

## 2023-11-02 NOTE — PHARMACY-MEDICATION REGIMEN REVIEW
Pharmacy Antimicrobial Stewardship Assessment     Current Antimicrobial Therapy:  Anti-infectives (From now, onward)      Start     Dose/Rate Route Frequency Ordered Stop    23 1900  levofloxacin (LEVAQUIN) infusion 750 mg         750 mg  100 mL/hr over 90 Minutes Intravenous EVERY 24 HOURS 23          Indication: Sepsis/pyelonephritis    Days of Therapy: 2     Pertinent Labs:  Recent Labs   Lab Test 23  0533 23  1734   WBC 19.4* 25.6*     Recent Labs   Lab Test 23  1841 23  1734   LACT 1.1  --    PCAL  --  0.50*        Temperature:  Temp (24hrs), Av.1  F (37.8  C), Min:98.2  F (36.8  C), Max:101.9  F (38.8  C)    Culture Results:   Collected Updated Procedure Result Status    2023 1919 2023 1014 Blood Culture Hand, Right [[id number]]   (Abnormal)   Blood from Hand, Right    Preliminary result Component Value   Culture Gram negative bacilli Panic  P    1 of 2 bottles             2023 1906 2023 1340 Blood Culture Arm, Left [[id number]]   Blood from Arm, Left    Preliminary result Component Value   Culture 1 of 2 bottles. Gram stain is preliminary and is submitted to microbiology staff for review. P             2023 1845 2023 1154 Urine Culture [[id number]]   (Abnormal)   Urine, Midstream    Preliminary result Component Value   Culture >100,000 CFU/mL Lactose fermenting gram negative bacilli Abnormal  P        Recommendations/Interventions:  None at this time    Namrata Albarran, formerly [facility]  2023

## 2023-11-03 LAB
ANION GAP SERPL CALCULATED.3IONS-SCNC: 12 MMOL/L (ref 7–15)
BACTERIA UR CULT: ABNORMAL
BASOPHILS # BLD AUTO: 0 10E3/UL (ref 0–0.2)
BASOPHILS NFR BLD AUTO: 0 %
BUN SERPL-MCNC: 8.1 MG/DL (ref 6–20)
CALCIUM SERPL-MCNC: 8.5 MG/DL (ref 8.6–10)
CHLORIDE SERPL-SCNC: 105 MMOL/L (ref 98–107)
CREAT SERPL-MCNC: 0.68 MG/DL (ref 0.51–0.95)
DEPRECATED HCO3 PLAS-SCNC: 20 MMOL/L (ref 22–29)
EGFRCR SERPLBLD CKD-EPI 2021: >90 ML/MIN/1.73M2
EOSINOPHIL # BLD AUTO: 0.1 10E3/UL (ref 0–0.7)
EOSINOPHIL NFR BLD AUTO: 1 %
ERYTHROCYTE [DISTWIDTH] IN BLOOD BY AUTOMATED COUNT: 12.4 % (ref 10–15)
GLUCOSE SERPL-MCNC: 173 MG/DL (ref 70–99)
HCT VFR BLD AUTO: 34.9 % (ref 35–47)
HGB BLD-MCNC: 11.3 G/DL (ref 11.7–15.7)
IMM GRANULOCYTES # BLD: 0.1 10E3/UL
IMM GRANULOCYTES NFR BLD: 1 %
LACTATE SERPL-SCNC: 0.7 MMOL/L (ref 0.7–2)
LACTATE SERPL-SCNC: 2.9 MMOL/L (ref 0.7–2)
LYMPHOCYTES # BLD AUTO: 1.2 10E3/UL (ref 0.8–5.3)
LYMPHOCYTES NFR BLD AUTO: 13 %
MCH RBC QN AUTO: 29.4 PG (ref 26.5–33)
MCHC RBC AUTO-ENTMCNC: 32.4 G/DL (ref 31.5–36.5)
MCV RBC AUTO: 91 FL (ref 78–100)
MONOCYTES # BLD AUTO: 0.6 10E3/UL (ref 0–1.3)
MONOCYTES NFR BLD AUTO: 7 %
NEUTROPHILS # BLD AUTO: 7.1 10E3/UL (ref 1.6–8.3)
NEUTROPHILS NFR BLD AUTO: 78 %
NRBC # BLD AUTO: 0 10E3/UL
NRBC BLD AUTO-RTO: 0 /100
PLATELET # BLD AUTO: 171 10E3/UL (ref 150–450)
POTASSIUM SERPL-SCNC: 3.5 MMOL/L (ref 3.4–5.3)
POTASSIUM SERPL-SCNC: 3.6 MMOL/L (ref 3.4–5.3)
RBC # BLD AUTO: 3.85 10E6/UL (ref 3.8–5.2)
SODIUM SERPL-SCNC: 137 MMOL/L (ref 135–145)
WBC # BLD AUTO: 9 10E3/UL (ref 4–11)

## 2023-11-03 PROCEDURE — 99232 SBSQ HOSP IP/OBS MODERATE 35: CPT | Performed by: INTERNAL MEDICINE

## 2023-11-03 PROCEDURE — 250N000013 HC RX MED GY IP 250 OP 250 PS 637: Performed by: HOSPITALIST

## 2023-11-03 PROCEDURE — 83605 ASSAY OF LACTIC ACID: CPT | Performed by: INTERNAL MEDICINE

## 2023-11-03 PROCEDURE — 36415 COLL VENOUS BLD VENIPUNCTURE: CPT | Performed by: INTERNAL MEDICINE

## 2023-11-03 PROCEDURE — 250N000013 HC RX MED GY IP 250 OP 250 PS 637: Performed by: INTERNAL MEDICINE

## 2023-11-03 PROCEDURE — 84132 ASSAY OF SERUM POTASSIUM: CPT | Performed by: INTERNAL MEDICINE

## 2023-11-03 PROCEDURE — 80048 BASIC METABOLIC PNL TOTAL CA: CPT | Performed by: HOSPITALIST

## 2023-11-03 PROCEDURE — 36415 COLL VENOUS BLD VENIPUNCTURE: CPT | Performed by: HOSPITALIST

## 2023-11-03 PROCEDURE — 250N000011 HC RX IP 250 OP 636: Mod: JZ | Performed by: HOSPITALIST

## 2023-11-03 PROCEDURE — 120N000001 HC R&B MED SURG/OB

## 2023-11-03 PROCEDURE — 85025 COMPLETE CBC W/AUTO DIFF WBC: CPT | Performed by: INTERNAL MEDICINE

## 2023-11-03 PROCEDURE — 258N000003 HC RX IP 258 OP 636: Performed by: HOSPITALIST

## 2023-11-03 RX ADMIN — DIAZEPAM 10 MG: 5 TABLET ORAL at 00:14

## 2023-11-03 RX ADMIN — KETOROLAC TROMETHAMINE 15 MG: 15 INJECTION, SOLUTION INTRAMUSCULAR; INTRAVENOUS at 22:28

## 2023-11-03 RX ADMIN — ACETAMINOPHEN 650 MG: 325 TABLET, FILM COATED ORAL at 14:09

## 2023-11-03 RX ADMIN — ACETAMINOPHEN 650 MG: 325 TABLET, FILM COATED ORAL at 04:35

## 2023-11-03 RX ADMIN — FLUOXETINE 20 MG: 20 CAPSULE ORAL at 09:32

## 2023-11-03 RX ADMIN — ACETAMINOPHEN 650 MG: 325 TABLET, FILM COATED ORAL at 00:14

## 2023-11-03 RX ADMIN — KETOROLAC TROMETHAMINE 15 MG: 15 INJECTION, SOLUTION INTRAMUSCULAR; INTRAVENOUS at 04:36

## 2023-11-03 RX ADMIN — SENNOSIDES AND DOCUSATE SODIUM 2 TABLET: 50; 8.6 TABLET ORAL at 20:17

## 2023-11-03 RX ADMIN — DILTIAZEM HYDROCHLORIDE 120 MG: 120 CAPSULE, COATED, EXTENDED RELEASE ORAL at 09:32

## 2023-11-03 RX ADMIN — CYCLOBENZAPRINE 10 MG: 10 TABLET, FILM COATED ORAL at 11:22

## 2023-11-03 RX ADMIN — ACETAMINOPHEN 650 MG: 325 TABLET, FILM COATED ORAL at 09:26

## 2023-11-03 RX ADMIN — HYDROXYZINE HYDROCHLORIDE 10 MG: 10 TABLET ORAL at 14:09

## 2023-11-03 RX ADMIN — LEVOFLOXACIN 750 MG: 750 INJECTION, SOLUTION INTRAVENOUS at 18:44

## 2023-11-03 RX ADMIN — CYCLOBENZAPRINE 10 MG: 10 TABLET, FILM COATED ORAL at 18:54

## 2023-11-03 RX ADMIN — SODIUM CHLORIDE, POTASSIUM CHLORIDE, SODIUM LACTATE AND CALCIUM CHLORIDE: 600; 310; 30; 20 INJECTION, SOLUTION INTRAVENOUS at 14:10

## 2023-11-03 RX ADMIN — KETOROLAC TROMETHAMINE 15 MG: 15 INJECTION, SOLUTION INTRAMUSCULAR; INTRAVENOUS at 16:32

## 2023-11-03 RX ADMIN — KETOROLAC TROMETHAMINE 15 MG: 15 INJECTION, SOLUTION INTRAMUSCULAR; INTRAVENOUS at 10:36

## 2023-11-03 RX ADMIN — ACETAMINOPHEN 650 MG: 325 TABLET, FILM COATED ORAL at 18:54

## 2023-11-03 RX ADMIN — SENNOSIDES AND DOCUSATE SODIUM 1 TABLET: 50; 8.6 TABLET ORAL at 09:32

## 2023-11-03 RX ADMIN — HYDROXYZINE HYDROCHLORIDE 10 MG: 10 TABLET ORAL at 09:43

## 2023-11-03 RX ADMIN — SODIUM CHLORIDE, POTASSIUM CHLORIDE, SODIUM LACTATE AND CALCIUM CHLORIDE: 600; 310; 30; 20 INJECTION, SOLUTION INTRAVENOUS at 04:36

## 2023-11-03 RX ADMIN — FLUOXETINE 10 MG: 10 CAPSULE ORAL at 09:32

## 2023-11-03 ASSESSMENT — ACTIVITIES OF DAILY LIVING (ADL)
ADLS_ACUITY_SCORE: 20

## 2023-11-03 NOTE — PLAN OF CARE
"Reason for hospital stay:  Sepsis, pyelonephritis    Living situation PTA: Home  Most recent vitals: /61 (BP Location: Right arm, Patient Position: Semi-Marion's, Cuff Size: Adult Regular)   Pulse 106   Temp 99.6  F (37.6  C) (Tympanic)   Resp 26   Ht 1.702 m (5' 7\")   Wt 101.4 kg (223 lb 8.7 oz)   SpO2 96%   BMI 35.01 kg/m      Pain Management:  Patient reported pain 2-9 this shift. Administered PRN tylenol and PRN Toradol. Upon reassessment, patient reported adequate relief.  LOC:  A&O x4  Cardiac:  Apical pulse regular, tachycardic.   Respiratory:  Lung sounds clear and equal bilaterally, maintaining sats on room air.  GI:  Bowel sounds audible and normoactive, patient reported nausea with migraine - Administered PRN Zofran, upon reassessment, patient denied any nausea.   :  Flank pain, voiding spontaneously without difficulty  Skin Issues:  None     IVF:   mL/hr  ABX:  IV Levaquin      Nutrition: Regular  Activity: Ambulates to bathroom with a stand by assist  Safety:  Call light within reach, calls appropriately, makes needs known, lighting adjusted, nonskid footwear in place.    Face to face report given with opportunity to observe patient.    Report given to ETHAN Ramírez RN   11/3/2023  7:41 AM    "

## 2023-11-03 NOTE — PROGRESS NOTES
Blood cultures 2 sets out of 2 positive for gram-negative bacilli.  Patient is already on Levaquin.

## 2023-11-03 NOTE — PROVIDER NOTIFICATION
Sepsis BPA continues to fire, lactic was checked and was 2.9 and Dr. Bautista was notified (see flowsheet). No lactic at this time. MD will address.

## 2023-11-03 NOTE — PHARMACY-MEDICATION REGIMEN REVIEW
Pharmacy Antimicrobial Stewardship Assessment     Current Antimicrobial Therapy:  Anti-infectives (From now, onward)      Start     Dose/Rate Route Frequency Ordered Stop    23 1900  levofloxacin (LEVAQUIN) infusion 750 mg         750 mg  100 mL/hr over 90 Minutes Intravenous EVERY 24 HOURS 23          Indication: Sepsis/Pyelonephritis    Days of Therapy: 3     Pertinent Labs:  Recent Labs   Lab Test 23  0702 23  0533 23  1734   WBC 9.0 19.4* 25.6*     Recent Labs   Lab Test 23  1643 23  1841 23  1734   LACT 1.5 1.1  --    PCAL  --   --  0.50*        Temperature:  Temp (24hrs), Av.2  F (37.9  C), Min:98.4  F (36.9  C), Max:101.8  F (38.8  C)    Culture Results:   Collected Updated Procedure Result Status    2023 1919 2023 1906 Blood Culture Hand, Right [[id number]]   (Abnormal)   Blood from Hand, Right    Preliminary result Component Value   Culture Gram negative bacilli Panic  P    1 of 2 bottles    2 of 2 bottles. Gram stain is preliminary and is submitted to microbiology staff for review. P             2023 1906 2023 0754 Blood Culture Arm, Left [[id number]]   (Abnormal)   Blood from Arm, Left    Preliminary result Component Value   Culture Lactose fermenting gram negative bacilli Panic  P    1 of 2 bottles             2023 1845 2023 0655 Urine Culture [[id number]]    (Abnormal)   Urine, Midstream    Final result Component Value   Culture >100,000 CFU/mL Escherichia coli Abnormal        Susceptibility       Escherichia coli     GABRIELLE     Amoxicillin/Clavulanate 4 Susceptible *     Ampicillin >=32 Resistant     Ampicillin/ Sulbactam 4 Susceptible     Cefazolin <=4 Susceptible     Cefepime <=1 Susceptible     Ceftazidime <=1 Susceptible     Ceftriaxone <=1 Susceptible     Ciprofloxacin <=0.25 Susceptible     Ertapenem <=0.5 Susceptible     Extended Spectrum Beta-Lactamase Negative ESBL Negative *     Gentamicin <=1  Susceptible     Imipenem <=0.25 Susceptible     Levofloxacin <=0.12 Susceptible     Nitrofurantoin <=16 Susceptible     Piperacillin/Tazobactam <=4 Susceptible     Tobramycin <=1 Susceptible     Trimethoprim/Sulfamethoxazole <=1/19 Susceptible            Recommendations/Interventions:  WBC normal, but still afebrile and in pain. Usual duration of treatment is 5-7 days. No recommendations at this time.     Namrata Albarran, [facility]  November 3, 2023     Otezla Counseling: The side effects of Otezla were discussed with the patient, including but not limited to worsening or new depression, weight loss, diarrhea, nausea, upper respiratory tract infection, and headache. Patient instructed to call the office should any adverse effect occur.  The patient verbalized understanding of the proper use and possible adverse effects of Otezla.  All the patient's questions and concerns were addressed.

## 2023-11-03 NOTE — PROGRESS NOTES
"[facility]  Hospitalist Progress Note          Assessment and Plan:     Assessment & Plan  Valorie Awad is a 28 year old female admitted on 11/1/2023 with sepsis due to right pyelonephritis.      #Sepsis due to acute right-sided pyelonephritis  #Acute right-sided pyelonephritis  - 28 year old female presented to the hospital complaining of right flank pain, abdominal pain which started yesterday.    -Patient also had a fever of 103 at home.    -On admission patient met SIRS criteria with tachycardia heart rate of 117, fever of 100.7, leukocytosis with WBC elevated at 25.6,  -Lactic acid 1.1  - UA positive for UTI with WBC more than 182, leukocyte esterase large,   CT abdomen and pelvis reported Findings most consistent with acute pyelonephritis involving the right kidney.   -Follow urine culture and blood culture  - Started on IV fluid, IV antibiotic with Levaquin.    -She has multiple allergies including Bactrim, clindamycin, loracarbef.  -11/2: White count down from 25-19.  Remains on Levaquin.  Patient is afebrile this morning.  -11/3: WBC normalized to 9 this morning.  Still with fevers and flank pain.   Continue IV antibiotics and IVFs.  Urine culture has resulted in E. coli which is generally pansensitive except for resistance to ampicillin.  Due to her ongoing fevers and right flank pain we will continue with IV Levaquin for now.     # Migraines: Prn Toradol.      #Anxiety disorder  #Depression  -Resume home medications        Diet:  Regular  DVT Prophylaxis: Enoxaparin (Lovenox) SQ  Saavedra Catheter: Not present  Lines: None     Cardiac Monitoring: None  Code Status:  full  Dispo: 1-2 days       Clinically Significant Risk Factors        # Hypokalemia: Lowest K = 3.3 mmol/L in last 2 days, will replace as needed                  # Obesity: Estimated body mass index is 35.01 kg/m  as calculated from the following:    Height as of this encounter: 1.702 m (5' 7\").    Weight as of this encounter: " 101.4 kg (223 lb 8.7 oz)., PRESENT ON ADMISSION                  Interval History:     His white count has normalized however continues to have fevers.  She is lying in bed and somewhat diaphoretic.  She does complain of intermittent and ongoing migraines for which we have been using Toradol.              Medications:      diltiazem ER COATED BEADS  120 mg Oral Daily    enoxaparin ANTICOAGULANT  40 mg Subcutaneous Q24H    FLUoxetine  10 mg Oral Daily    FLUoxetine  20 mg Oral Daily    levofloxacin  750 mg Intravenous Q24H    senna-docusate  1 tablet Oral BID    Or    senna-docusate  2 tablet Oral BID                  Physical Exam:     Vitals:    23 0014 23 0435 23 0436 23 0620   BP:   136/61    BP Location:   Right arm    Patient Position:   Semi-Marion's    Cuff Size:   Adult Regular    Pulse:   106    Resp:   26    Temp: 99  F (37.2  C) (!) 101.7  F (38.7  C) (!) 101.7  F (38.7  C) 99.6  F (37.6  C)   TempSrc:   Tympanic Tympanic   SpO2:   96%    Weight:       Height:             Vital Sign Ranges  Temperature Temp  Av.2  F (37.9  C)  Min: 98.5  F (36.9  C)  Max: 101.8  F (38.8  C)   Blood pressure Systolic (24hrs), Av , Min:108 , Max:136        Diastolic (24hrs), Av, Min:57, Max:66      Pulse Pulse  Av  Min: 92  Max: 114   Respirations Resp  Av.5  Min: 22  Max: 26   Pulse oximetry SpO2  Av %  Min: 93 %  Max: 98 %         Intake/Output Summary (Last 24 hours) at 11/3/2023 0714  Last data filed at 2023 2220  Gross per 24 hour   Intake 720 ml   Output 2875 ml   Net -2155 ml     General:  Alert and Orientated.  NAD.  Heart:  RRR, S1 S2, No murmurs, no rubs, no gallops.  Resp: CTA bilaterally.  No wheezes, no rhonchi, no crackles.  Abd: Soft/NT/ND/Positve BS. + right flank/back tenderness.  Ext: No edema noted in either lower extremity  Neuro:  No focal Neurologic deficits noted.    Peripheral IV 23 Anterior;Left Lower forearm (Active)   Site  Assessment WDL 11/02/23 2030   Line Status Infusing 11/02/23 2030   Dressing Transparent 11/02/23 2030   Dressing Status clean;dry;intact 11/02/23 2030   Line Intervention Flushed 11/02/23 2030   Phlebitis Scale 0-->no symptoms 11/02/23 2030   Infiltration? no 11/02/23 2030   Number of days: 2     No line/device             Data:     Lab Results   Component Value Date    WBC 19.4 (H) 11/02/2023    WBC 25.6 (H) 11/01/2023    WBC 12.5 (H) 11/01/2022    HGB 10.8 (L) 11/02/2023    HGB 11.9 11/01/2023    HGB 13.4 11/01/2022    HCT 31.5 (L) 11/02/2023    HCT 34.5 (L) 11/01/2023    HCT 38.5 11/01/2022     11/02/2023     11/01/2023     11/01/2022     11/02/2023     11/01/2023     11/01/2022    POTASSIUM 3.6 11/03/2023    POTASSIUM 3.3 (L) 11/02/2023    POTASSIUM 3.4 11/01/2023    CHLORIDE 99 11/02/2023    CHLORIDE 100 11/01/2023    CHLORIDE 105 11/01/2022    CO2 25 11/02/2023    CO2 23 11/01/2023    CO2 26 11/01/2022    BUN 10.6 11/02/2023    BUN 11.1 11/01/2023    BUN 22.7 (H) 11/01/2022    CR 0.88 11/02/2023    CR 0.89 11/01/2023    CR 0.73 11/01/2022     (H) 11/02/2023     (H) 11/01/2023    GLC 90 11/01/2022    AST 14 11/01/2023    AST 15 05/27/2022    AST 22 07/19/2018    ALT 13 11/01/2023    ALT 20 05/27/2022    ALT 32 07/19/2018    ALKPHOS 88 11/01/2023    ALKPHOS 61 05/27/2022    ALKPHOS 90 07/19/2018    BILITOTAL 0.3 11/01/2023    BILITOTAL 0.3 05/27/2022    BILITOTAL 0.5 07/19/2018     Lactic Acid   Date Value Ref Range Status   11/02/2023 1.5 0.7 - 2.0 mmol/L Final   11/01/2023 1.1 0.7 - 2.0 mmol/L Final   07/21/2021 1.3 0.7 - 2.0 mmol/L Final       Ry Bautista, DO

## 2023-11-03 NOTE — PLAN OF CARE
-- Message is from the [facility]--    Patient overbooked for PHF with the provider listed below:    Provider: RAMANDEEP RUIZ   Date:  03/18/2022  Time: 1:45 PM        The patient's PCP was unavailable due to (No schedule available, AM/PM time slots already overbooked, etc..). Patient needed to be booked with trusted partner to achieve patient being seen in timeframe associated with risk readmission score.   Patient A&O, up independently to bathroom, steady on her feet, patient agrees to call if she feels dizzy from her migraine. Continues on IV Levaquin. LR running @ 100 ml/hr. Voiding, urine is dark and cloudy. Reports some flank and abdominal pain throughout shift, but patient mainly reporting migraine pain. Declines offer of Imitrex as it doesn't work once she has a migraine per patient report. Patient medicated with PRN Tylenol x3, PRN Toradol x2, PRN Flexeril x2 this shift for her migraine and flank pain. Patient also medicated with PRN Atarax x2 this shift for anxiety. Patient's pain at the start of shift was 8/10 did decrease as low as a 3/10. Ice pack provided and switched out throughout shift as well and lights kept off and cares clustered, minimized interruptions. Patient drinking fluids and eating yogurt, fruit cups, applesauce, jello during shift. Denies nausea. Vital signs as charted, max temp 100.8, last temp 99.5. Patient interacting a little more with staff this afternoon. Patient sleeping off and on throughout the day. Mother brought patient [employer] for supper and patient was visiting with light on at end of shift, rates pain 3/10. Call light within reach, makes needs known.    Face to face report given with opportunity to observe patient.    Report given to Gerardo HOOD RN & Gerardo Casey RN   11/3/2023  7:28 PM

## 2023-11-04 LAB
ANION GAP SERPL CALCULATED.3IONS-SCNC: 12 MMOL/L (ref 7–15)
BACTERIA BLD CULT: ABNORMAL
BASOPHILS # BLD AUTO: 0 10E3/UL (ref 0–0.2)
BASOPHILS NFR BLD AUTO: 0 %
BUN SERPL-MCNC: 5.9 MG/DL (ref 6–20)
CALCIUM SERPL-MCNC: 8.8 MG/DL (ref 8.6–10)
CHLORIDE SERPL-SCNC: 103 MMOL/L (ref 98–107)
CREAT SERPL-MCNC: 0.7 MG/DL (ref 0.51–0.95)
DEPRECATED HCO3 PLAS-SCNC: 23 MMOL/L (ref 22–29)
EGFRCR SERPLBLD CKD-EPI 2021: >90 ML/MIN/1.73M2
EOSINOPHIL # BLD AUTO: 0.1 10E3/UL (ref 0–0.7)
EOSINOPHIL NFR BLD AUTO: 1 %
ERYTHROCYTE [DISTWIDTH] IN BLOOD BY AUTOMATED COUNT: 12.2 % (ref 10–15)
GLUCOSE SERPL-MCNC: 126 MG/DL (ref 70–99)
HCT VFR BLD AUTO: 33.2 % (ref 35–47)
HGB BLD-MCNC: 11.5 G/DL (ref 11.7–15.7)
IMM GRANULOCYTES # BLD: 0.1 10E3/UL
IMM GRANULOCYTES NFR BLD: 1 %
LYMPHOCYTES # BLD AUTO: 1.4 10E3/UL (ref 0.8–5.3)
LYMPHOCYTES NFR BLD AUTO: 18 %
MCH RBC QN AUTO: 29.3 PG (ref 26.5–33)
MCHC RBC AUTO-ENTMCNC: 34.6 G/DL (ref 31.5–36.5)
MCV RBC AUTO: 85 FL (ref 78–100)
MONOCYTES # BLD AUTO: 0.6 10E3/UL (ref 0–1.3)
MONOCYTES NFR BLD AUTO: 7 %
NEUTROPHILS # BLD AUTO: 5.8 10E3/UL (ref 1.6–8.3)
NEUTROPHILS NFR BLD AUTO: 73 %
NRBC # BLD AUTO: 0 10E3/UL
NRBC BLD AUTO-RTO: 0 /100
PLATELET # BLD AUTO: 227 10E3/UL (ref 150–450)
POTASSIUM SERPL-SCNC: 3.6 MMOL/L (ref 3.4–5.3)
RBC # BLD AUTO: 3.92 10E6/UL (ref 3.8–5.2)
SODIUM SERPL-SCNC: 138 MMOL/L (ref 135–145)
WBC # BLD AUTO: 7.9 10E3/UL (ref 4–11)

## 2023-11-04 PROCEDURE — 250N000011 HC RX IP 250 OP 636: Mod: JZ | Performed by: HOSPITALIST

## 2023-11-04 PROCEDURE — 250N000013 HC RX MED GY IP 250 OP 250 PS 637: Performed by: HOSPITALIST

## 2023-11-04 PROCEDURE — 36415 COLL VENOUS BLD VENIPUNCTURE: CPT | Performed by: INTERNAL MEDICINE

## 2023-11-04 PROCEDURE — 250N000013 HC RX MED GY IP 250 OP 250 PS 637: Performed by: INTERNAL MEDICINE

## 2023-11-04 PROCEDURE — 120N000001 HC R&B MED SURG/OB

## 2023-11-04 PROCEDURE — 85025 COMPLETE CBC W/AUTO DIFF WBC: CPT | Performed by: INTERNAL MEDICINE

## 2023-11-04 PROCEDURE — 258N000003 HC RX IP 258 OP 636: Performed by: HOSPITALIST

## 2023-11-04 PROCEDURE — 99232 SBSQ HOSP IP/OBS MODERATE 35: CPT | Performed by: INTERNAL MEDICINE

## 2023-11-04 PROCEDURE — 80048 BASIC METABOLIC PNL TOTAL CA: CPT | Performed by: INTERNAL MEDICINE

## 2023-11-04 RX ORDER — LEVOFLOXACIN 500 MG/1
500 TABLET, FILM COATED ORAL EVERY 24 HOURS
Status: DISCONTINUED | OUTPATIENT
Start: 2023-11-04 | End: 2023-11-05 | Stop reason: HOSPADM

## 2023-11-04 RX ADMIN — KETOROLAC TROMETHAMINE 15 MG: 15 INJECTION, SOLUTION INTRAMUSCULAR; INTRAVENOUS at 15:29

## 2023-11-04 RX ADMIN — SENNOSIDES AND DOCUSATE SODIUM 2 TABLET: 50; 8.6 TABLET ORAL at 21:45

## 2023-11-04 RX ADMIN — DILTIAZEM HYDROCHLORIDE 120 MG: 120 CAPSULE, COATED, EXTENDED RELEASE ORAL at 09:25

## 2023-11-04 RX ADMIN — ACETAMINOPHEN 650 MG: 325 TABLET, FILM COATED ORAL at 01:46

## 2023-11-04 RX ADMIN — KETOROLAC TROMETHAMINE 15 MG: 15 INJECTION, SOLUTION INTRAMUSCULAR; INTRAVENOUS at 04:36

## 2023-11-04 RX ADMIN — SENNOSIDES AND DOCUSATE SODIUM 1 TABLET: 50; 8.6 TABLET ORAL at 09:24

## 2023-11-04 RX ADMIN — ACETAMINOPHEN 650 MG: 325 TABLET, FILM COATED ORAL at 15:27

## 2023-11-04 RX ADMIN — ACETAMINOPHEN 650 MG: 325 TABLET, FILM COATED ORAL at 21:50

## 2023-11-04 RX ADMIN — LEVOFLOXACIN 500 MG: 500 TABLET, FILM COATED ORAL at 18:25

## 2023-11-04 RX ADMIN — CYCLOBENZAPRINE 10 MG: 10 TABLET, FILM COATED ORAL at 09:26

## 2023-11-04 RX ADMIN — SODIUM CHLORIDE, POTASSIUM CHLORIDE, SODIUM LACTATE AND CALCIUM CHLORIDE: 600; 310; 30; 20 INJECTION, SOLUTION INTRAVENOUS at 01:40

## 2023-11-04 RX ADMIN — FLUOXETINE 20 MG: 20 CAPSULE ORAL at 09:25

## 2023-11-04 RX ADMIN — ACETAMINOPHEN 650 MG: 325 TABLET, FILM COATED ORAL at 09:23

## 2023-11-04 RX ADMIN — FLUOXETINE 10 MG: 10 CAPSULE ORAL at 09:24

## 2023-11-04 ASSESSMENT — ACTIVITIES OF DAILY LIVING (ADL)
ADLS_ACUITY_SCORE: 20

## 2023-11-04 NOTE — PLAN OF CARE
"/80 (BP Location: Right arm, Cuff Size: Adult Regular)   Pulse 94   Temp 99.3  F (37.4  C) (Tympanic)   Resp 16   Ht 1.702 m (5' 7\")   Wt 101.4 kg (223 lb 8.7 oz)   SpO2 94%   BMI 35.01 kg/m      A&O, VSS, highest temp this shift 99.3, IV SL, did c/o headache as well as flank/back pain as charted did give PRN tylenolx2 and toradolx1 with relief, lungs clear, remains on RA, good urine output, up independently in room and tolerating well, bowel sounds active, denies N/V, no BM, fair appetite, did eat 100% of breakfast and mother brought in food for supper, free from falls, makes needs known, call light within reach.    Face to face report given with opportunity to observe patient.    Report given to Gerardo Zhang, RNs    Jeanie Mix RN   11/4/2023  7:13 PM    "

## 2023-11-04 NOTE — PROGRESS NOTES
[facility]  Hospitalist Progress Note          Assessment and Plan:     Assessment & Plan  Valorie Awad is a 28 year old female admitted on 11/1/2023 with sepsis due to right pyelonephritis.      #Sepsis due to acute right-sided pyelonephritis  #Acute right-sided pyelonephritis  - 28 year old female presented to the hospital complaining of right flank pain, abdominal pain which started yesterday.    -Patient also had a fever of 103 at home.    -On admission patient met SIRS criteria with tachycardia heart rate of 117, fever of 100.7, leukocytosis with WBC elevated at 25.6,  -Lactic acid 1.1  - UA positive for UTI with WBC more than 182, leukocyte esterase large,   CT abdomen and pelvis reported Findings most consistent with acute pyelonephritis involving the right kidney.   -Follow urine culture and blood culture  - Started on IV fluid, IV antibiotic with Levaquin.    -She has multiple allergies including Bactrim, clindamycin, loracarbef.  -11/2: White count down from 25-19.  Remains on Levaquin.  Patient is afebrile this morning.  -11/3: WBC normalized to 9 this morning.  Still with fevers and flank pain.   Continue IV antibiotics and IVFs.  Urine culture has resulted in E. coli which is generally pansensitive except for resistance to ampicillin.  Due to her ongoing fevers and right flank pain we will continue with IV Levaquin for now.  -11/4: Patient improving overall.  WBC 7.9.  Fever curve is stabilizing.  We will switch to p.o. levofloxacin today.  Stop IV fluids.  Patient's appetite is returning, Toradol help with headache.  Flank pain improving.     # Migraines: Prn Toradol.      #Anxiety disorder  #Depression  -Resume home medications        Diet:  Regular  DVT Prophylaxis: Enoxaparin (Lovenox) SQ  Saavedra Catheter: Not present  Lines: None     Cardiac Monitoring: None  Code Status:  full  Dispo: Possibly tomorrow pending improvement       Clinically Significant Risk Factors                        "  # Obesity: Estimated body mass index is 35.01 kg/m  as calculated from the following:    Height as of this encounter: 1.702 m (5' 7\").    Weight as of this encounter: 101.4 kg (223 lb 8.7 oz)., PRESENT ON ADMISSION                  Interval History:     Patient seen in room.  Patient reports subjective improvement.  Headache has improved with Toradol.  Appetite is returning.  No subjective fevers or chills last night.  Flank pain down to 3 out of 10, previously was at 10.              Medications:      diltiazem ER COATED BEADS  120 mg Oral Daily    enoxaparin ANTICOAGULANT  40 mg Subcutaneous Q24H    FLUoxetine  10 mg Oral Daily    FLUoxetine  20 mg Oral Daily    levofloxacin  500 mg Oral Q24H    senna-docusate  1 tablet Oral BID    Or    senna-docusate  2 tablet Oral BID                  Physical Exam:     Vitals:    11/03/23 1843 11/03/23 2014 11/04/23 0436 11/04/23 0915   BP:  138/72 132/81 138/81   BP Location:  Right arm Right arm Right arm   Patient Position:  Supine Supine    Cuff Size:  Adult Regular Adult Regular    Pulse:  105 91 87   Resp:  14 20 16   Temp: 99.5  F (37.5  C) 99.8  F (37.7  C) 99.3  F (37.4  C) 98.6  F (37  C)   TempSrc: Tympanic Tympanic Tympanic Tympanic   SpO2:  95% 98% 96%   Weight:       Height:               Intake/Output Summary (Last 24 hours) at 11/4/2023 0932  Last data filed at 11/4/2023 0929  Gross per 24 hour   Intake 4298 ml   Output 3590 ml   Net 708 ml         General:  Alert and Orientated.  NAD.  Heart:  RRR, S1 S2, No murmurs, no rubs, no gallops.  Resp: CTA bilaterally.  No wheezes, no rhonchi, no crackles.  Abd: Soft/NT/ND/Positve BS. + right flank/back tenderness.  Ext: No edema noted in either lower extremity  Neuro:  No focal Neurologic deficits noted.    Peripheral IV 11/01/23 Anterior;Left Lower forearm (Active)   Site Assessment WDL 11/04/23 0436   Line Status Infusing 11/04/23 0436   Dressing Transparent 11/04/23 0436   Dressing Status clean;dry;intact " 11/04/23 0436   Line Intervention Flushed 11/03/23 1042   Phlebitis Scale 0-->no symptoms 11/04/23 0436   Infiltration? no 11/04/23 0436   Number of days: 3     No line/device             Data:     Lab Results   Component Value Date    WBC 7.9 11/04/2023    WBC 9.0 11/03/2023    WBC 19.4 (H) 11/02/2023    HGB 11.5 (L) 11/04/2023    HGB 11.3 (L) 11/03/2023    HGB 10.8 (L) 11/02/2023    HCT 33.2 (L) 11/04/2023    HCT 34.9 (L) 11/03/2023    HCT 31.5 (L) 11/02/2023     11/04/2023     11/03/2023     11/02/2023     11/04/2023     11/03/2023     11/02/2023    POTASSIUM 3.6 11/04/2023    POTASSIUM 3.5 11/03/2023    POTASSIUM 3.6 11/03/2023    CHLORIDE 103 11/04/2023    CHLORIDE 105 11/03/2023    CHLORIDE 99 11/02/2023    CO2 23 11/04/2023    CO2 20 (L) 11/03/2023    CO2 25 11/02/2023    BUN 5.9 (L) 11/04/2023    BUN 8.1 11/03/2023    BUN 10.6 11/02/2023    CR 0.70 11/04/2023    CR 0.68 11/03/2023    CR 0.88 11/02/2023     (H) 11/04/2023     (H) 11/03/2023     (H) 11/02/2023    AST 14 11/01/2023    AST 15 05/27/2022    AST 22 07/19/2018    ALT 13 11/01/2023    ALT 20 05/27/2022    ALT 32 07/19/2018    ALKPHOS 88 11/01/2023    ALKPHOS 61 05/27/2022    ALKPHOS 90 07/19/2018    BILITOTAL 0.3 11/01/2023    BILITOTAL 0.3 05/27/2022    BILITOTAL 0.5 07/19/2018     Lactic Acid   Date Value Ref Range Status   11/02/2023 1.5 0.7 - 2.0 mmol/L Final   11/01/2023 1.1 0.7 - 2.0 mmol/L Final   07/21/2021 1.3 0.7 - 2.0 mmol/L Final       Colleen Parr MD, MD

## 2023-11-04 NOTE — PHARMACY-MEDICATION REGIMEN REVIEW
[facility]    Pharmacy      Antimicrobial Stewardship Note     Current antimicrobial therapy:          Culture Results:         Recommendations/Interventions:  1. Recommend keeping levofloxacin dose at 750 mg due to severity of illness.      Alondra Mix [facility]  November 4, 2023

## 2023-11-04 NOTE — PLAN OF CARE
"Reason for hospital stay: Sepsis  Living situation PTA: Home  Most recent vitals: /72 (BP Location: Right arm, Patient Position: Supine, Cuff Size: Adult Regular)   Pulse 105   Temp 99.8  F (37.7  C) (Tympanic)   Resp 14   Ht 1.702 m (5' 7\")   Wt 101.4 kg (223 lb 8.7 oz)   SpO2 95%   BMI 35.01 kg/m      Pain Management:  Patient rated head, flank and abd pain 3-4/10 this shift with relief from Toradol and Tylenol.   LOC:  Alert and oriented.   Cardiac:  BP stable. HR slightly tachy in low 100s.  Respiratory:  All lung sounds clear equal bilaterally. Sats maintained above 90% on RA.    GI:  Bowel sounds present x4. RUQ & RLQ pain present along with R flank pain. No BM this shift, but patient reports passing gas.   :  Voiding spontaneously without difficulty. Patient urinated a total of 640 this shift. Urine continues to be a dark tea color.   Skin Issues:  No concerns.     IVF: LR @ 100 ml/hr  ABX:  levofloxacin q 24     Nutrition: Regular diet. Patient reported tolerated dinner well.   Activity: Independent in room.   Safety:  Bed in lowest position. Wheels locked. Call light and personal items within reach. Make needs known.     Face to face report given with opportunity to observe patient.    Report given to Jeanie OK RN.    Gerardo Tavera RN   11/4/2023  7:17 AM      "

## 2023-11-05 VITALS
RESPIRATION RATE: 14 BRPM | SYSTOLIC BLOOD PRESSURE: 137 MMHG | WEIGHT: 223.55 LBS | HEART RATE: 87 BPM | BODY MASS INDEX: 35.09 KG/M2 | OXYGEN SATURATION: 97 % | DIASTOLIC BLOOD PRESSURE: 82 MMHG | TEMPERATURE: 98.3 F | HEIGHT: 67 IN

## 2023-11-05 LAB
ANION GAP SERPL CALCULATED.3IONS-SCNC: 12 MMOL/L (ref 7–15)
BASOPHILS # BLD AUTO: 0 10E3/UL (ref 0–0.2)
BASOPHILS NFR BLD AUTO: 0 %
BUN SERPL-MCNC: 10.1 MG/DL (ref 6–20)
CALCIUM SERPL-MCNC: 9 MG/DL (ref 8.6–10)
CHLORIDE SERPL-SCNC: 101 MMOL/L (ref 98–107)
CREAT SERPL-MCNC: 0.72 MG/DL (ref 0.51–0.95)
DEPRECATED HCO3 PLAS-SCNC: 25 MMOL/L (ref 22–29)
EGFRCR SERPLBLD CKD-EPI 2021: >90 ML/MIN/1.73M2
EOSINOPHIL # BLD AUTO: 0.2 10E3/UL (ref 0–0.7)
EOSINOPHIL NFR BLD AUTO: 3 %
ERYTHROCYTE [DISTWIDTH] IN BLOOD BY AUTOMATED COUNT: 12.2 % (ref 10–15)
GLUCOSE SERPL-MCNC: 101 MG/DL (ref 70–99)
HCT VFR BLD AUTO: 33.4 % (ref 35–47)
HGB BLD-MCNC: 11.6 G/DL (ref 11.7–15.7)
IMM GRANULOCYTES # BLD: 0.1 10E3/UL
IMM GRANULOCYTES NFR BLD: 2 %
LYMPHOCYTES # BLD AUTO: 3.1 10E3/UL (ref 0.8–5.3)
LYMPHOCYTES NFR BLD AUTO: 38 %
MCH RBC QN AUTO: 29.2 PG (ref 26.5–33)
MCHC RBC AUTO-ENTMCNC: 34.7 G/DL (ref 31.5–36.5)
MCV RBC AUTO: 84 FL (ref 78–100)
MONOCYTES # BLD AUTO: 0.7 10E3/UL (ref 0–1.3)
MONOCYTES NFR BLD AUTO: 9 %
NEUTROPHILS # BLD AUTO: 3.9 10E3/UL (ref 1.6–8.3)
NEUTROPHILS NFR BLD AUTO: 48 %
NRBC # BLD AUTO: 0 10E3/UL
NRBC BLD AUTO-RTO: 0 /100
PLATELET # BLD AUTO: 294 10E3/UL (ref 150–450)
POTASSIUM SERPL-SCNC: 4 MMOL/L (ref 3.4–5.3)
RBC # BLD AUTO: 3.97 10E6/UL (ref 3.8–5.2)
SODIUM SERPL-SCNC: 138 MMOL/L (ref 135–145)
WBC # BLD AUTO: 8.1 10E3/UL (ref 4–11)

## 2023-11-05 PROCEDURE — 80048 BASIC METABOLIC PNL TOTAL CA: CPT | Performed by: INTERNAL MEDICINE

## 2023-11-05 PROCEDURE — 36415 COLL VENOUS BLD VENIPUNCTURE: CPT | Performed by: INTERNAL MEDICINE

## 2023-11-05 PROCEDURE — 250N000013 HC RX MED GY IP 250 OP 250 PS 637: Performed by: INTERNAL MEDICINE

## 2023-11-05 PROCEDURE — 250N000013 HC RX MED GY IP 250 OP 250 PS 637: Performed by: HOSPITALIST

## 2023-11-05 PROCEDURE — 85025 COMPLETE CBC W/AUTO DIFF WBC: CPT | Performed by: INTERNAL MEDICINE

## 2023-11-05 PROCEDURE — 99239 HOSP IP/OBS DSCHRG MGMT >30: CPT | Performed by: INTERNAL MEDICINE

## 2023-11-05 RX ORDER — LEVOFLOXACIN 500 MG/1
500 TABLET, FILM COATED ORAL EVERY 24 HOURS
Qty: 10 TABLET | Refills: 0 | Status: SHIPPED | OUTPATIENT
Start: 2023-11-05 | End: 2023-11-15

## 2023-11-05 RX ADMIN — ACETAMINOPHEN 650 MG: 325 TABLET, FILM COATED ORAL at 04:54

## 2023-11-05 RX ADMIN — DILTIAZEM HYDROCHLORIDE 120 MG: 120 CAPSULE, COATED, EXTENDED RELEASE ORAL at 08:34

## 2023-11-05 RX ADMIN — FLUOXETINE 20 MG: 20 CAPSULE ORAL at 08:35

## 2023-11-05 RX ADMIN — SENNOSIDES AND DOCUSATE SODIUM 1 TABLET: 50; 8.6 TABLET ORAL at 08:35

## 2023-11-05 RX ADMIN — FLUOXETINE 10 MG: 10 CAPSULE ORAL at 08:35

## 2023-11-05 ASSESSMENT — ACTIVITIES OF DAILY LIVING (ADL)
ADLS_ACUITY_SCORE: 20
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
## 2023-11-05 NOTE — PLAN OF CARE
"Reason for hospital stay: Sepsis  Living situation PTA: Home  Most recent vitals: /75 (BP Location: Right arm, Patient Position: Supine, Cuff Size: Adult Regular)   Pulse 93   Temp 98  F (36.7  C)   Resp 16   Ht 1.702 m (5' 7\")   Wt 101.4 kg (223 lb 8.7 oz)   SpO2 94%   BMI 35.01 kg/m        Pain Management:  Patient rated head, flank and abd pain 3-4/10 this shift with relief from Tylenol.   LOC:  Alert and oriented.   Cardiac: HRR reg. BP stable.  Respiratory:  All lung sounds clear equal bilaterally. Sats maintained above 93% on RA.    GI:  Bowel sounds present x4. C/o R flank pain. Denied abd pain this shift. Patient reports having 1 large BM this shift.  :  Voiding spontaneously without difficulty. Patient urine continues to be dark tea color.   Skin Issues:  No concerns.     IVF: SL  ABX:  levofloxacin q 24     Nutrition: Regular diet. Patient reported tolerated dinner well.   Activity: Independent in room.   Safety:  Bed in lowest position. Wheels locked. Call light and personal items within reach. Make needs known.     Face to face report given with opportunity to observe patient.    Report given to Jeanie KO RN.    Gerardo Tavera RN   11/5/2023  7:13 AM    "

## 2023-11-05 NOTE — DISCHARGE INSTRUCTIONS
Please call [phone] to make a follow up hospital visit with your Primary care provider Syeda Caballero within 7 days of discharge.

## 2023-11-05 NOTE — PLAN OF CARE
"/82 (BP Location: Right arm, Cuff Size: Adult Regular)   Pulse 87   Temp 98.3  F (36.8  C) (Tympanic)   Resp 14   Ht 1.702 m (5' 7\")   Wt 101.4 kg (223 lb 8.7 oz)   SpO2 97%   BMI 35.01 kg/m      A&O, VSS, afebrile, c/o pain to flank 2/10 declines intervention, lungs clear, remains on RA, IV removed for discharge, denies N/V, refuse breakfast stating will eat once discharge. Moves independently in room, free from falls, makes needs known.    Patient discharged at 10:09 AM via wheel chair accompanied by staff. Prescriptions sent to patients preferred pharmacy. All belongings sent with patient.     Discharge instructions reviewed with patient. Listed belongings gathered and returned to patient. yes    Patient discharged to home.   Report called to N/A    Surgical Patient   Surgical Procedures during stay: N/A  Did patient receive discharge instruction on wound care and recognition of infection symptoms? Yes    MISC  Follow up appointment made:  No  Home medications returned to patient: N/A  Patient reports pain was well managed at discharge: Yes    Jeanie Mix RN on 11/5/2023 at 10:43 AM      "

## 2023-11-06 ENCOUNTER — PATIENT OUTREACH (OUTPATIENT)
Dept: CARE COORDINATION | Facility: OTHER | Age: 28
End: 2023-11-06

## 2023-11-06 NOTE — PROGRESS NOTES
Clinic Care Coordination Contact  Care Team Conversations        TCM attempt times 1. VM message left for patient inform ing her that I would be calling againg tomorrow.     Kimberly Boecker, RN

## 2023-11-07 ENCOUNTER — PATIENT OUTREACH (OUTPATIENT)
Dept: FAMILY MEDICINE | Facility: OTHER | Age: 28
End: 2023-11-07

## 2023-11-07 LAB — BACTERIA BLD CULT: ABNORMAL

## 2023-11-07 NOTE — PROGRESS NOTES
Clinic Care Coordination Contact  Care Team Conversations    TCM attempt times 2. RN will call again tomorrow.     Kimberly Boecker, RN

## 2023-11-09 ENCOUNTER — PATIENT OUTREACH (OUTPATIENT)
Dept: CARE COORDINATION | Facility: OTHER | Age: 28
End: 2023-11-09

## 2023-11-09 DIAGNOSIS — F41.0 PANIC DISORDER WITHOUT AGORAPHOBIA: ICD-10-CM

## 2023-11-09 DIAGNOSIS — G43.719 INTRACTABLE CHRONIC MIGRAINE WITHOUT AURA AND WITHOUT STATUS MIGRAINOSUS: ICD-10-CM

## 2023-11-09 RX ORDER — DILTIAZEM HYDROCHLORIDE 120 MG/1
120 CAPSULE, EXTENDED RELEASE ORAL DAILY
Qty: 30 CAPSULE | Refills: 0 | Status: SHIPPED | OUTPATIENT
Start: 2023-11-09 | End: 2023-12-20

## 2023-11-09 NOTE — LETTER
Valorie Awad,      I have been unsuccessful in reaching you since your recent hospitalization. At this time the transition care management team will make no further attempts to reach you but feel free to call anytime with any questions or concerns.         All of us at [facility] are invested in your health and are here to assist you in meeting your goals.           Sincerely,     Transition Care Management Team

## 2023-11-09 NOTE — TELEPHONE ENCOUNTER
Diazepam (Valium) 10 mg tablet  Take 1 tablet (10 mg) by mouth daily as needed for anxiety.     Last Written Prescription Date:  8-7-2023  Last Fill Quantity: 25 tablet,   # refills: 1  Last Office Visit: 8-7-2023  Future Office visit:    Next 5 appointments (look out 90 days)      Nov 15, 2023  3:30 PM  (Arrive by 3:15 PM)  SHORT with Herve Blakely DO  [facility] [place] ) [address]  [place] [address]  [phone]

## 2023-11-09 NOTE — Clinical Note
TCM attempt times 3. VM message left for patient and letter mailed out today informing patient we are here for her if she needs anything.

## 2023-11-09 NOTE — PROGRESS NOTES
Clinic Care Coordination Contact  Care Team Conversations    TCM attempt times 3. No further contact byt this RN. Letter mailed out to patient with contact number to call if she has any needs or concerns.    Kimberly Boecker, RN  Care Coordination

## 2023-11-10 RX ORDER — DIAZEPAM 10 MG
10 TABLET ORAL DAILY PRN
Qty: 25 TABLET | Refills: 0 | Status: SHIPPED | OUTPATIENT
Start: 2023-11-10 | End: 2023-12-20

## 2023-11-14 NOTE — PROGRESS NOTES
Assessment & Plan     Hospital discharge follow-up  History of acute pyelonephritis  Yeast infection of the vagina  - fluconazole (DIFLUCAN) 150 MG tablet; Take 1 tablet (150 mg) by mouth every 3 days for 3 doses    Doing well.  HR and BP up a bit, she ran out of her diltiazem - picking up from pharmacy today.  Otherwise feeling well.  Likely yeast infection - she prefers presumptive treatment.  Follow-up if worsening, not improving as anticipated/discussed, or any new symptoms/concerns.      MED REC REQUIRED Post Medication Reconciliation Status:  Discharge medications reconciled, continue medications without change      YUE JACOBS,   [facility] - ROGER Eugene is a 28 year old, presenting for the following health issues:  Hospital F/U        11/15/2023     3:21 PM   Additional Questions   Roomed by Tisha Kaufman   Accompanied by none         11/15/2023     3:21 PM   Patient Reported Additional Medications   Patient reports taking the following new medications none       HPI       28 year old female being seen for a hospital discharge follow-up. [facility] 11/1/23-11/5/23.  She presented for fever and flank pain.  Found to have right sided pyelonephritis.  Met sepsis criteria.  Started on Levaquin due to her allergies.  Urine and blood cultures grew E coli sensitive to that.  She improved, transitioned to orals.  Discharged with 10 more days abx to complete a 14 day course.    She reports feeling well, back to normal.  Denies back/flank pain, fever/chills.  Urination is back to normal.  Sh does have some vaginal itching, she thinks yeast as she tends to get that with antibiotics.  Last dose of Levaquin was yesterday.    Ran out of her diltiazem 3 days ago - states ready at pharmacy but hasn't picked it up yet.    Hospital Follow-up Visit:    Hospital/Nursing Home/IP Rehab Facility: [facility]  Date of Admission: 11/1  Date of Discharge: 11/5  Reason(s) for  Admission: Sepsis    Was your hospitalization related to COVID-19? No   Problems taking medications regularly:  None  Medication changes since discharge: None  Problems adhering to non-medication therapy:  None    Summary of hospitalization:  [facility] discharge summary reviewed  Diagnostic Tests/Treatments reviewed.  Follow up needed: none  Other Healthcare Providers Involved in Patient s Care:         None  Update since discharge: improved.         Plan of care communicated with patient      Review of Systems   Constitutional:  Negative for chills, diaphoresis, fatigue and fever.   Respiratory:  Negative for shortness of breath.    Cardiovascular:  Negative for chest pain, palpitations and peripheral edema.   Gastrointestinal:  Negative for abdominal pain, constipation, diarrhea, heartburn and nausea.   Genitourinary:  Negative for dysuria, flank pain, frequency and hematuria.            Objective    BP (!) 142/94 (BP Location: Right arm, Patient Position: Sitting, Cuff Size: Adult Regular)   Pulse 107   Temp 98.5  F (36.9  C) (Tympanic)   Wt 100.1 kg (220 lb 11.2 oz)   LMP 11/07/2023 (Within Days)   SpO2 95%   BMI 34.57 kg/m    Body mass index is 34.57 kg/m .  Physical Exam  Constitutional:       General: She is not in acute distress.     Appearance: Normal appearance.   Cardiovascular:      Comments: HR upper 90s, regular, no murmur  Pulmonary:      Effort: Pulmonary effort is normal.      Breath sounds: Normal breath sounds. No wheezing, rhonchi or rales.   Abdominal:      General: Bowel sounds are normal. There is no distension.      Palpations: Abdomen is soft.      Tenderness: There is no abdominal tenderness. There is no right CVA tenderness, left CVA tenderness or guarding.      Hernia: No hernia is present.   Musculoskeletal:      Right lower leg: No edema.      Left lower leg: No edema.   Skin:     General: Skin is dry.   Neurological:      Mental Status: She is alert and oriented  to person, place, and time.                              Answers submitted by the patient for this visit:  Patient Health Questionnaire (Submitted on 11/15/2023)  If you checked off any problems, how difficult have these problems made it for you to do your work, take care of things at home, or get along with other people?: Somewhat difficult  PHQ9 TOTAL SCORE: 10  NITZA-7 (Submitted on 11/15/2023)  NITZA 7 TOTAL SCORE: 16

## 2023-11-15 ENCOUNTER — OFFICE VISIT (OUTPATIENT)
Dept: FAMILY MEDICINE | Facility: OTHER | Age: 28
End: 2023-11-15
Attending: FAMILY MEDICINE
Payer: COMMERCIAL

## 2023-11-15 VITALS
TEMPERATURE: 98.5 F | DIASTOLIC BLOOD PRESSURE: 94 MMHG | SYSTOLIC BLOOD PRESSURE: 142 MMHG | BODY MASS INDEX: 34.57 KG/M2 | OXYGEN SATURATION: 95 % | HEART RATE: 96 BPM | WEIGHT: 220.7 LBS

## 2023-11-15 DIAGNOSIS — B37.31 YEAST INFECTION OF THE VAGINA: ICD-10-CM

## 2023-11-15 DIAGNOSIS — Z09 HOSPITAL DISCHARGE FOLLOW-UP: Primary | ICD-10-CM

## 2023-11-15 DIAGNOSIS — Z87.448 HISTORY OF ACUTE PYELONEPHRITIS: ICD-10-CM

## 2023-11-15 PROCEDURE — 99495 TRANSJ CARE MGMT MOD F2F 14D: CPT | Performed by: FAMILY MEDICINE

## 2023-11-15 PROCEDURE — G0463 HOSPITAL OUTPT CLINIC VISIT: HCPCS

## 2023-11-15 RX ORDER — FLUCONAZOLE 150 MG/1
150 TABLET ORAL
Qty: 3 TABLET | Refills: 0 | Status: SHIPPED | OUTPATIENT
Start: 2023-11-15 | End: 2023-11-22

## 2023-11-15 ASSESSMENT — ANXIETY QUESTIONNAIRES
5. BEING SO RESTLESS THAT IT IS HARD TO SIT STILL: NEARLY EVERY DAY
3. WORRYING TOO MUCH ABOUT DIFFERENT THINGS: MORE THAN HALF THE DAYS
4. TROUBLE RELAXING: NEARLY EVERY DAY
GAD7 TOTAL SCORE: 16
2. NOT BEING ABLE TO STOP OR CONTROL WORRYING: MORE THAN HALF THE DAYS
7. FEELING AFRAID AS IF SOMETHING AWFUL MIGHT HAPPEN: NOT AT ALL
IF YOU CHECKED OFF ANY PROBLEMS ON THIS QUESTIONNAIRE, HOW DIFFICULT HAVE THESE PROBLEMS MADE IT FOR YOU TO DO YOUR WORK, TAKE CARE OF THINGS AT HOME, OR GET ALONG WITH OTHER PEOPLE: SOMEWHAT DIFFICULT
1. FEELING NERVOUS, ANXIOUS, OR ON EDGE: NEARLY EVERY DAY
GAD7 TOTAL SCORE: 16
6. BECOMING EASILY ANNOYED OR IRRITABLE: NEARLY EVERY DAY

## 2023-11-15 ASSESSMENT — PATIENT HEALTH QUESTIONNAIRE - PHQ9
SUM OF ALL RESPONSES TO PHQ QUESTIONS 1-9: 10
10. IF YOU CHECKED OFF ANY PROBLEMS, HOW DIFFICULT HAVE THESE PROBLEMS MADE IT FOR YOU TO DO YOUR WORK, TAKE CARE OF THINGS AT HOME, OR GET ALONG WITH OTHER PEOPLE: SOMEWHAT DIFFICULT
SUM OF ALL RESPONSES TO PHQ QUESTIONS 1-9: 10

## 2023-11-21 ASSESSMENT — ENCOUNTER SYMPTOMS
FEVER: 0
CONSTIPATION: 0
CHILLS: 0
FLANK PAIN: 0
SHORTNESS OF BREATH: 0
HEMATURIA: 0
HEARTBURN: 0
NAUSEA: 0
DIARRHEA: 0
ABDOMINAL PAIN: 0
DIAPHORESIS: 0
FREQUENCY: 0
FATIGUE: 0
DYSURIA: 0
PALPITATIONS: 0

## 2023-11-27 DIAGNOSIS — Z30.019 ENCOUNTER FOR FEMALE BIRTH CONTROL: ICD-10-CM

## 2023-11-28 ENCOUNTER — TRANSFERRED RECORDS (OUTPATIENT)
Dept: HEALTH INFORMATION MANAGEMENT | Facility: CLINIC | Age: 28
End: 2023-11-28

## 2023-11-28 RX ORDER — DESOGESTREL AND ETHINYL ESTRADIOL 0.15-0.03
1 KIT ORAL DAILY
Qty: 84 TABLET | Refills: 0 | Status: SHIPPED | OUTPATIENT
Start: 2023-11-28 | End: 2024-02-01

## 2023-11-28 NOTE — TELEPHONE ENCOUNTER
Enskyce 0.15-30 MG MCG tablets    Last Written Prescription Date:  09/15/2023  Last Fill Quantity: 84,   # refills: 0  Last Office Visit: 11/15/2023

## 2023-12-18 DIAGNOSIS — F41.0 PANIC DISORDER WITHOUT AGORAPHOBIA: ICD-10-CM

## 2023-12-18 DIAGNOSIS — G43.719 INTRACTABLE CHRONIC MIGRAINE WITHOUT AURA AND WITHOUT STATUS MIGRAINOSUS: ICD-10-CM

## 2023-12-18 NOTE — TELEPHONE ENCOUNTER
Valium      Last Written Prescription Date:  11/10/23  Last Fill Quantity: 25,   # refills: 0  Last Office Visit: 11/15/23  Future Office visit:       Routing refill request to provider for review/approval because:

## 2023-12-20 RX ORDER — DILTIAZEM HYDROCHLORIDE 120 MG/1
120 CAPSULE, EXTENDED RELEASE ORAL DAILY
Qty: 30 CAPSULE | Refills: 0 | Status: SHIPPED | OUTPATIENT
Start: 2023-12-20 | End: 2024-01-19

## 2023-12-20 RX ORDER — DIAZEPAM 10 MG
10 TABLET ORAL DAILY PRN
Qty: 25 TABLET | Refills: 2 | Status: SHIPPED | OUTPATIENT
Start: 2023-12-20 | End: 2024-08-21

## 2024-01-17 DIAGNOSIS — G43.719 INTRACTABLE CHRONIC MIGRAINE WITHOUT AURA AND WITHOUT STATUS MIGRAINOSUS: ICD-10-CM

## 2024-01-17 NOTE — TELEPHONE ENCOUNTER
Diltiazem  Last Written Prescription Date: 12/20/23  Last Fill Quantity: 30 # of Refills: 0  Last Office Visit: 11/15/23

## 2024-01-19 RX ORDER — DILTIAZEM HYDROCHLORIDE 120 MG/1
120 CAPSULE, EXTENDED RELEASE ORAL DAILY
Qty: 30 CAPSULE | Refills: 0 | Status: SHIPPED | OUTPATIENT
Start: 2024-01-19 | End: 2024-02-23

## 2024-02-01 DIAGNOSIS — Z30.019 ENCOUNTER FOR FEMALE BIRTH CONTROL: ICD-10-CM

## 2024-02-01 RX ORDER — DESOGESTREL AND ETHINYL ESTRADIOL 0.15-0.03
1 KIT ORAL DAILY
Qty: 28 TABLET | Refills: 0 | Status: SHIPPED | OUTPATIENT
Start: 2024-02-01 | End: 2024-02-13

## 2024-02-01 NOTE — TELEPHONE ENCOUNTER
I'd like to see her for an annual visit.  I will refill for a month for her to last in the meantime as I would not want her to run out.  Can we call her to schedule this?

## 2024-02-01 NOTE — TELEPHONE ENCOUNTER
Isibloom      Last Written Prescription Date:  11/28/23  Last Fill Quantity: 84,   # refills: 0  Last Office Visit: 11/15/23  Future Office visit:       Routing refill request to provider for review/approval because:

## 2024-02-01 NOTE — TELEPHONE ENCOUNTER
Please see note below for Isibloom   Patient hasn't seen PCP since 9/27/22.  Will route to PCP to review and sign.  Has seen Dr. Blakely last on 11/15/23.  Diana Velez, ETHAN  Care Coordination

## 2024-02-13 ENCOUNTER — OFFICE VISIT (OUTPATIENT)
Dept: FAMILY MEDICINE | Facility: OTHER | Age: 29
End: 2024-02-13
Attending: STUDENT IN AN ORGANIZED HEALTH CARE EDUCATION/TRAINING PROGRAM
Payer: COMMERCIAL

## 2024-02-13 VITALS
HEART RATE: 101 BPM | DIASTOLIC BLOOD PRESSURE: 86 MMHG | OXYGEN SATURATION: 96 % | SYSTOLIC BLOOD PRESSURE: 130 MMHG | WEIGHT: 219.8 LBS | BODY MASS INDEX: 34.43 KG/M2 | TEMPERATURE: 98.5 F

## 2024-02-13 DIAGNOSIS — E55.9 VITAMIN D DEFICIENCY: ICD-10-CM

## 2024-02-13 DIAGNOSIS — Z30.019 ENCOUNTER FOR FEMALE BIRTH CONTROL: ICD-10-CM

## 2024-02-13 DIAGNOSIS — Z00.00 ROUTINE HISTORY AND PHYSICAL EXAMINATION OF ADULT: Primary | ICD-10-CM

## 2024-02-13 PROCEDURE — 99395 PREV VISIT EST AGE 18-39: CPT | Performed by: STUDENT IN AN ORGANIZED HEALTH CARE EDUCATION/TRAINING PROGRAM

## 2024-02-13 RX ORDER — DESOGESTREL AND ETHINYL ESTRADIOL 0.15-0.03
1 KIT ORAL DAILY
Qty: 28 TABLET | Refills: 0 | Status: SHIPPED | OUTPATIENT
Start: 2024-02-13 | End: 2024-06-14

## 2024-02-13 SDOH — HEALTH STABILITY: PHYSICAL HEALTH: ON AVERAGE, HOW MANY MINUTES DO YOU ENGAGE IN EXERCISE AT THIS LEVEL?: 0 MIN

## 2024-02-13 SDOH — HEALTH STABILITY: PHYSICAL HEALTH: ON AVERAGE, HOW MANY DAYS PER WEEK DO YOU ENGAGE IN MODERATE TO STRENUOUS EXERCISE (LIKE A BRISK WALK)?: 0 DAYS

## 2024-02-13 ASSESSMENT — PAIN SCALES - GENERAL: PAINLEVEL: MODERATE PAIN (5)

## 2024-02-13 ASSESSMENT — ANXIETY QUESTIONNAIRES
1. FEELING NERVOUS, ANXIOUS, OR ON EDGE: MORE THAN HALF THE DAYS
3. WORRYING TOO MUCH ABOUT DIFFERENT THINGS: MORE THAN HALF THE DAYS
6. BECOMING EASILY ANNOYED OR IRRITABLE: NEARLY EVERY DAY
GAD7 TOTAL SCORE: 13
GAD7 TOTAL SCORE: 13
7. FEELING AFRAID AS IF SOMETHING AWFUL MIGHT HAPPEN: NOT AT ALL
8. IF YOU CHECKED OFF ANY PROBLEMS, HOW DIFFICULT HAVE THESE MADE IT FOR YOU TO DO YOUR WORK, TAKE CARE OF THINGS AT HOME, OR GET ALONG WITH OTHER PEOPLE?: VERY DIFFICULT
5. BEING SO RESTLESS THAT IT IS HARD TO SIT STILL: MORE THAN HALF THE DAYS
2. NOT BEING ABLE TO STOP OR CONTROL WORRYING: MORE THAN HALF THE DAYS
7. FEELING AFRAID AS IF SOMETHING AWFUL MIGHT HAPPEN: NOT AT ALL
4. TROUBLE RELAXING: MORE THAN HALF THE DAYS
IF YOU CHECKED OFF ANY PROBLEMS ON THIS QUESTIONNAIRE, HOW DIFFICULT HAVE THESE PROBLEMS MADE IT FOR YOU TO DO YOUR WORK, TAKE CARE OF THINGS AT HOME, OR GET ALONG WITH OTHER PEOPLE: VERY DIFFICULT
GAD7 TOTAL SCORE: 13

## 2024-02-13 ASSESSMENT — PATIENT HEALTH QUESTIONNAIRE - PHQ9
10. IF YOU CHECKED OFF ANY PROBLEMS, HOW DIFFICULT HAVE THESE PROBLEMS MADE IT FOR YOU TO DO YOUR WORK, TAKE CARE OF THINGS AT HOME, OR GET ALONG WITH OTHER PEOPLE: SOMEWHAT DIFFICULT
SUM OF ALL RESPONSES TO PHQ QUESTIONS 1-9: 11
SUM OF ALL RESPONSES TO PHQ QUESTIONS 1-9: 11

## 2024-02-13 ASSESSMENT — SOCIAL DETERMINANTS OF HEALTH (SDOH): HOW OFTEN DO YOU GET TOGETHER WITH FRIENDS OR RELATIVES?: PATIENT DECLINED

## 2024-02-13 NOTE — COMMUNITY RESOURCES LIST (ENGLISH)
02/13/2024    [employer]  N/A  For questions about this resource list or additional care needs, please contact your primary care clinic or care manager.  Phone: [phone]   Email: N/A   Address: [address] [place] [id number]   Hours: N/A        Exercise and Recreation       Gym or workout facility  1  [facility] Distance: 18.04 miles      In-Person   [address] [place] [address]  Language: English  Hours: Mon - Sun Open 24 Hours  Fees: Insurance, Self Pay, Sliding Fee   Phone: [phone]) [phone] Email: virginia@[employer] Website: https://[email or web address]/40/nnzgqfxl-xs-05805/          Important Numbers & Websites       Emergency Services   911  City Services   311  Poison Control   (807) 202-8032  Suicide Prevention Lifeline   (856) 976-9517 (TALK)  Child Abuse Hotline   (324) 165-8007 (4-A-Child)  Sexual Assault Hotline   (358) 835-6500 (HOPE)  National Runaway Safeline   (140) 535-2766 (RUNAWAY)  All-Options Talkline   (426) 161-3880  Substance Abuse Referral   (190) 496-4212 (HELP)

## 2024-02-13 NOTE — PROGRESS NOTES
"Preventive Care Visit  [facility]  Syeda Caballero MD, Family Medicine  Feb 13, 2024    Assessment & Plan     Routine history and physical examination of adult  Valorie is here for her annual physical  Her most recent PAP was 11/29/2021 and was normal.  She will be due for repeat at the end of 2024  She is fasting today, will check some fasting labs  She declines flu and COVID vaccine  She declines hepatitis C screening, no known exposures  - Lipid Profile (Chol, Trig, HDL, LDL calc); Future  - CBC with platelets and differential; Future  - Comprehensive metabolic panel; Future    Encounter for female birth control  Refill  - desogestrel-ethinyl estradiol (ISIBLOOM) 0.15-30 MG-MCG tablet; Take 1 tablet by mouth daily  - TDAP 7+ (ADACEL,BOOSTRIX)    Vitamin D deficiency  - Vitamin D Deficiency; Future        25 minutes spent by me on the date of the encounter doing chart review, history and exam, documentation and further activities per the note      BMI  Estimated body mass index is 34.43 kg/m  as calculated from the following:    Height as of 11/1/23: 1.702 m (5' 7\").    Weight as of this encounter: 99.7 kg (219 lb 12.8 oz).   Weight management plan: Discussed healthy diet and exercise guidelines    Counseling  Appropriate preventive services were discussed with this patient, including applicable screening as appropriate for fall prevention, nutrition, physical activity, Tobacco-use cessation, weight loss and cognition.  Checklist reviewing preventive services available has been given to the patient.  The patient's PHQ-9 score is consistent with moderate depression. She was provided with information regarding depression.       No follow-ups on file.    Subjective   Valorie is a 28 year old, presenting for the following:  Physical  Working at [employer]  Getting occassional periods.  Family   10 year old kid  Lives at home with son, cousin  Feels safe at home.  Social EtoH, no smoking and no drugs  Has been " having trouble sleeping.  Either sleeps a long time or can't sleep.    On Prozac 30 mg.    Cannot take hydroxyzine often at night.  Melatonin she tells me keeps her up at night.   Does not want anything for sleep right now.      She is on diltiazem for migraines.         2/13/2024     3:56 PM   Additional Questions   Roomed by Luc Mix   Accompanied by None         2/13/2024     3:56 PM   Patient Reported Additional Medications   Patient reports taking the following new medications None        Health Care Directive  Patient does not have a Health Care Directive or Living Will: Discussed advance care planning with patient; however, patient declined at this time.    HPI          2/13/2024   General Health   How would you rate your overall physical health? (!) FAIR   Feel stress (tense, anxious, or unable to sleep) Very much   (!) STRESS CONCERN      2/13/2024   Nutrition   Three or more servings of calcium each day? (!) NO   Diet: Regular (no restrictions)   How many servings of fruit and vegetables per day? (!) I DON'T KNOW   How many sweetened beverages each day? (!) 2         2/13/2024   Exercise   Days per week of moderate/strenous exercise 0 days   Average minutes spent exercising at this level 0 min   (!) EXERCISE CONCERN      2/13/2024   Social Factors   Frequency of gathering with friends or relatives Patient declined   Worry food won't last until get money to buy more No   Food not last or not have enough money for food? No   Do you have housing?  Yes   Are you worried about losing your housing? No   Lack of transportation? No   Unable to get utilities (heat,electricity)? No         2/13/2024   Dental   Dentist two times every year? (!) NO         2/13/2024   TB Screening   Were you born outside of [place]?  No       Today's PHQ-9 Score:       2/13/2024     3:51 PM   PHQ-9 SCORE   PHQ-9 Total Score MyChart 11 (Moderate depression)   PHQ-9 Total Score 11         2/13/2024   Substance Use   Alcohol more than  3/day or more than 7/wk Not Applicable   Do you use any other substances recreationally? No     Social History     Tobacco Use    Smoking status: Never     Passive exposure: Current    Smokeless tobacco: Never   Vaping Use    Vaping Use: Never used   Substance Use Topics    Alcohol use: Yes     Comment: social    Drug use: No             2024   Breast Cancer Screening   Family history of breast, colon, or ovarian cancer? No / Unknown      Mammogram Screening - Patient under 40 years of age: Routine Mammogram Screening not recommended.         2024   STI Screening   New sexual partner(s) since last STI/HIV test? No     History of abnormal Pap smear: NO - age 21-29 PAP every 3 years recommended  Had         Latest Ref Rng & Units 2021     3:21 PM 2015    12:00 AM   PAP / HPV   PAP  Negative for Intraepithelial Lesion or Malignancy (NILM)     PAP (Historical)   NIL    HPV 16 DNA Negative Negative     HPV 18 DNA Negative Negative     Other HR HPV Negative Positive             2024   Contraception/Family Planning   Questions about contraception or family planning No        Reviewed and updated as needed this visit by Provider                    Past Medical History:   Diagnosis Date    Abnormal Pap smear     Anxiety 2001    Marijuana use     Migraine headaches 10/10/2007    Pyelonephritis, acute 2023    Supervision of other normal pregnancy      Past Surgical History:   Procedure Laterality Date    Comminuted fracture elbow Left 10/25/2013    lauren placed    CYSTOSCOPY      bladder distention    TONSILLECTOMY       OB History    Para Term  AB Living   1 1 1 0 0 0   SAB IAB Ectopic Multiple Live Births   0 0 0 0 0      # Outcome Date GA Lbr Herman/2nd Weight Sex Delivery Anes PTL Lv   1 Term 13 39w2d 02:25 / 00:40 3.085 kg (6 lb 12.8 oz) M Vag-Spont None N       Name: TANIA FRANKS      Apgar1: 7  Apgar5: 8         Review of Systems  Constitutional, HEENT,  "cardiovascular, pulmonary, GI, , musculoskeletal, neuro, skin, endocrine and psych systems are negative, except as otherwise noted.     Objective    Exam  BP (!) 133/91 (BP Location: Left arm, Patient Position: Sitting, Cuff Size: Adult Large)   Pulse 101   Temp 98.5  F (36.9  C) (Tympanic)   Wt 99.7 kg (219 lb 12.8 oz)   LMP 01/30/2024 (Within Days)   SpO2 96%   BMI 34.43 kg/m     Estimated body mass index is 34.43 kg/m  as calculated from the following:    Height as of 11/1/23: 1.702 m (5' 7\").    Weight as of this encounter: 99.7 kg (219 lb 12.8 oz).    Physical Exam  GENERAL: alert and no distress  EYES: Eyes grossly normal to inspection, PERRL and conjunctivae and sclerae normal  HENT: ear canals and TM's normal, nose and mouth without ulcers or lesions  NECK: no adenopathy, no asymmetry, masses, or scars  RESP: lungs clear to auscultation - no rales, rhonchi or wheezes  CV: regular rate and rhythm, normal S1 S2, no S3 or S4, no murmur, click or rub, no peripheral edema  ABDOMEN: soft, nontender, no hepatosplenomegaly, no masses and bowel sounds normal  MS: no gross musculoskeletal defects noted, no edema  SKIN: no suspicious lesions or rashes  NEURO: Normal strength and tone, mentation intact and speech normal  PSYCH: mentation appears normal, affect normal/bright  Breast: benign exam    Signed Electronically by: Syeda Caballero MD    Answers submitted by the patient for this visit:  Patient Health Questionnaire (Submitted on 2/13/2024)  If you checked off any problems, how difficult have these problems made it for you to do your work, take care of things at home, or get along with other people?: Somewhat difficult  PHQ9 TOTAL SCORE: 11  NITZA-7 (Submitted on 2/13/2024)  NITZA 7 TOTAL SCORE: 13    "

## 2024-02-17 ENCOUNTER — HEALTH MAINTENANCE LETTER (OUTPATIENT)
Age: 29
End: 2024-02-17

## 2024-02-21 PROCEDURE — 90715 TDAP VACCINE 7 YRS/> IM: CPT | Performed by: STUDENT IN AN ORGANIZED HEALTH CARE EDUCATION/TRAINING PROGRAM

## 2024-02-22 DIAGNOSIS — G43.719 INTRACTABLE CHRONIC MIGRAINE WITHOUT AURA AND WITHOUT STATUS MIGRAINOSUS: ICD-10-CM

## 2024-02-22 NOTE — TELEPHONE ENCOUNTER
Diltiazem      Last Written Prescription Date:  01/19/24  Last Fill Quantity: 30,   # refills: 0  Last Office Visit: 02/13/24  Future Office visit:

## 2024-02-23 RX ORDER — DILTIAZEM HYDROCHLORIDE 120 MG/1
120 CAPSULE, EXTENDED RELEASE ORAL DAILY
Qty: 30 CAPSULE | Refills: 7 | Status: SHIPPED | OUTPATIENT
Start: 2024-02-23

## 2024-06-14 DIAGNOSIS — Z30.019 ENCOUNTER FOR FEMALE BIRTH CONTROL: ICD-10-CM

## 2024-06-14 RX ORDER — DESOGESTREL AND ETHINYL ESTRADIOL 0.15-0.03
1 KIT ORAL DAILY
Qty: 28 TABLET | Refills: 0 | Status: SHIPPED | OUTPATIENT
Start: 2024-06-14 | End: 2024-07-18

## 2024-06-14 NOTE — TELEPHONE ENCOUNTER
desogestrel-ethinyl estradiol (ISIBLOOM) 0.15-30 MG-MCG tablet      Last Written Prescription Date:  2-13-24  Last Fill Quantity: 28,   # refills: 0  Last Office Visit: 2-13-24  Future Office visit:       Routing refill request to provider for review/approval because:

## 2024-07-16 DIAGNOSIS — Z30.019 ENCOUNTER FOR FEMALE BIRTH CONTROL: ICD-10-CM

## 2024-07-16 DIAGNOSIS — F41.0 PANIC DISORDER WITHOUT AGORAPHOBIA: ICD-10-CM

## 2024-07-16 NOTE — TELEPHONE ENCOUNTER
Isibloom      Last Written Prescription Date:  6/14/24  Last Fill Quantity: 28,   # refills: 0  Last Office Visit: 2/13/24  Future Office visit:       Routing refill request to provider for review/approval because:

## 2024-07-16 NOTE — TELEPHONE ENCOUNTER
Valium      Last Written Prescription Date:  12/20/23  Last Fill Quantity: 25,   # refills: 2  Last Office Visit: 2/13/24  Future Office visit:       Routing refill request to provider for review/approval because:

## 2024-07-17 NOTE — TELEPHONE ENCOUNTER
Contraceptives Protocol [id number]/16/2024 02:08 PM   Protocol Details Medication indicated for associated diagnosis

## 2024-07-18 RX ORDER — DESOGESTREL AND ETHINYL ESTRADIOL 0.15-0.03
1 KIT ORAL DAILY
Qty: 28 TABLET | Refills: 11 | Status: SHIPPED | OUTPATIENT
Start: 2024-07-18

## 2024-07-23 RX ORDER — DIAZEPAM 10 MG
10 TABLET ORAL DAILY PRN
Qty: 25 TABLET | Refills: 0 | OUTPATIENT
Start: 2024-07-23

## 2024-08-19 DIAGNOSIS — F41.0 PANIC DISORDER WITHOUT AGORAPHOBIA: ICD-10-CM

## 2024-08-21 RX ORDER — DIAZEPAM 10 MG
10 TABLET ORAL DAILY PRN
Qty: 25 TABLET | Refills: 0 | Status: SHIPPED | OUTPATIENT
Start: 2024-08-21

## 2024-09-20 DIAGNOSIS — F32.2 CURRENT SEVERE EPISODE OF MAJOR DEPRESSIVE DISORDER WITHOUT PSYCHOTIC FEATURES, UNSPECIFIED WHETHER RECURRENT (H): ICD-10-CM

## 2024-09-20 DIAGNOSIS — F33.2 MAJOR DEPRESSIVE DISORDER, RECURRENT SEVERE WITHOUT PSYCHOTIC FEATURES (H): ICD-10-CM

## 2024-09-20 NOTE — CONFIDENTIAL NOTE
FLUOXETINE HCL 20 MG CAPSULE       Last Written Prescription Date:  08/07/2023  Last Fill Quantity: 90,   # refills: 3  Last Office Visit: 08/07/2023  Future Office visit:       Routing refill request to provider for review/approval because:    [facility] Protocol [id number]/20/2024 02:06 PM   Protocol Details PHQ-9 score less than 5 in past 6 months    Recent (12 mo) or future (90 days) visit within the authorizing provider's specialty          Kimberly Boecker, RN

## 2024-09-23 RX ORDER — FLUOXETINE 10 MG/1
CAPSULE ORAL
Qty: 90 CAPSULE | Refills: 3 | Status: SHIPPED | OUTPATIENT
Start: 2024-09-23

## 2024-09-23 NOTE — TELEPHONE ENCOUNTER
FLUoxetine (PROZAC) 10 MG capsule 90 capsule 3 8/7/2023   Last Office Visit: 2/13/24     Future Office visit:       Routing refill request to provider for review/approval because:

## 2024-10-26 ENCOUNTER — HOSPITAL ENCOUNTER (EMERGENCY)
Facility: HOSPITAL | Age: 29
Discharge: HOME | End: 2024-10-27
Attending: FAMILY MEDICINE | Admitting: FAMILY MEDICINE
Payer: COMMERCIAL

## 2024-10-26 VITALS
DIASTOLIC BLOOD PRESSURE: 91 MMHG | RESPIRATION RATE: 22 BRPM | TEMPERATURE: 98.6 F | OXYGEN SATURATION: 96 % | SYSTOLIC BLOOD PRESSURE: 130 MMHG | HEART RATE: 111 BPM

## 2024-10-26 DIAGNOSIS — L02.412 CUTANEOUS ABSCESS OF LEFT AXILLA: ICD-10-CM

## 2024-10-26 PROCEDURE — 99284 EMERGENCY DEPT VISIT MOD MDM: CPT

## 2024-10-26 ASSESSMENT — COLUMBIA-SUICIDE SEVERITY RATING SCALE - C-SSRS
1. IN THE PAST MONTH, HAVE YOU WISHED YOU WERE DEAD OR WISHED YOU COULD GO TO SLEEP AND NOT WAKE UP?: NO
6. HAVE YOU EVER DONE ANYTHING, STARTED TO DO ANYTHING, OR PREPARED TO DO ANYTHING TO END YOUR LIFE?: NO
2. HAVE YOU ACTUALLY HAD ANY THOUGHTS OF KILLING YOURSELF IN THE PAST MONTH?: NO

## 2024-10-27 PROCEDURE — 250N000011 HC RX IP 250 OP 636: Performed by: FAMILY MEDICINE

## 2024-10-27 PROCEDURE — 250N000013 HC RX MED GY IP 250 OP 250 PS 637: Performed by: FAMILY MEDICINE

## 2024-10-27 PROCEDURE — 10060 I&D ABSCESS SIMPLE/SINGLE: CPT

## 2024-10-27 PROCEDURE — 96372 THER/PROPH/DIAG INJ SC/IM: CPT | Performed by: FAMILY MEDICINE

## 2024-10-27 PROCEDURE — 250N000009 HC RX 250: Performed by: FAMILY MEDICINE

## 2024-10-27 RX ORDER — KETOROLAC TROMETHAMINE 30 MG/ML
30 INJECTION, SOLUTION INTRAMUSCULAR; INTRAVENOUS ONCE
Status: COMPLETED | OUTPATIENT
Start: 2024-10-27 | End: 2024-10-27

## 2024-10-27 RX ORDER — DOXYCYCLINE 100 MG/1
100 CAPSULE ORAL ONCE
Status: COMPLETED | OUTPATIENT
Start: 2024-10-27 | End: 2024-10-27

## 2024-10-27 RX ORDER — DOXYCYCLINE 100 MG/1
100 CAPSULE ORAL 2 TIMES DAILY
Qty: 20 CAPSULE | Refills: 0 | Status: SHIPPED | OUTPATIENT
Start: 2024-10-27 | End: 2024-10-30

## 2024-10-27 RX ADMIN — KETOROLAC TROMETHAMINE 30 MG: 30 INJECTION, SOLUTION INTRAMUSCULAR at 01:35

## 2024-10-27 RX ADMIN — LIDOCAINE HYDROCHLORIDE 5 ML: 10 INJECTION, SOLUTION EPIDURAL; INFILTRATION; INTRACAUDAL; PERINEURAL at 01:01

## 2024-10-27 RX ADMIN — DOXYCYCLINE HYCLATE 100 MG: 100 CAPSULE ORAL at 01:35

## 2024-10-27 ASSESSMENT — ACTIVITIES OF DAILY LIVING (ADL)
ADLS_ACUITY_SCORE: 0

## 2024-10-27 NOTE — ED NOTES
AVS reviewed.   All questions answered.   Rx sent to outside pharmacy.     Meplex dressing applied.   Sent patient home w/ some gauze and tape.     Return to ED if needed.  Follow-up w/ PCP next week.

## 2024-10-27 NOTE — ED TRIAGE NOTES
Patient presents w/ c/o a painful lump in her left armpit.   Started this past Thursday and has gotten increasingly worse.   10/10 pain, no relief from pain w/ ibuprofen.      Triage Assessment (Adult)       Row Name 10/26/24 [id number]          Triage Assessment    Airway WDL WDL        Respiratory WDL    Respiratory WDL WDL;rhythm/pattern;expansion/retractions     Rhythm/Pattern, Respiratory unlabored;pattern regular;depth regular;no shortness of breath reported     Expansion/Accessory Muscles/Retractions no use of accessory muscles;no retractions;expansion symmetric        Peripheral/Neurovascular WDL    Peripheral Neurovascular WDL WDL        Cognitive/Neuro/Behavioral WDL    Cognitive/Neuro/Behavioral WDL WDL        Michael Coma Scale    Best Eye Response 4-->(E4) spontaneous     Best Motor Response 6-->(M6) obeys commands     Best Verbal Response 5-->(V5) oriented     [place] Coma Scale Score 15     Assessment Qualifiers patient not sedated/intubated;no eye obstruction present

## 2024-10-27 NOTE — ED PROVIDER NOTES
History     Chief Complaint   Patient presents with    Wound Infection     HPI  Valorie Awad is a 29 year old female who presents w/ c/o a painful lump in her left armpit.   Started this past Thursday and has gotten increasingly worse.   10/10 pain, no relief from pain w/ ibuprofen.   She had a cyst in this area 10 years ago that needed to be drained.       Allergies:  Allergies   Allergen Reactions    Bactrim [Sulfamethoxazole W-Trimethoprim] Rash    Morphine Hcl Nausea and Vomiting    Cleocin [Clindamycin] Other (See Comments)     Thrush      Macrobid [Nitrofurantoin]      Macrobid     Lorabid [Loracarbef] Rash       Problem List:    Patient Active Problem List    Diagnosis Date Noted    Pyelonephritis, acute 11/01/2023     Priority: Medium    Sepsis (H) 11/01/2023     Priority: Medium    Panic disorder without agoraphobia 10/18/2018     Priority: Medium     Patient is followed by  for ongoing prescription of benzodiazepines.  All refills should be approved by this provider, or covering partner.    Medication(s): Valium 10 mg.   Maximum quantity per month: 30  Clinic visit frequency required: Q 3 months     Controlled substance agreement on file: Yes       Date(s): 9.18.19  Benzodiazepine use reviewed by psychiatry:  Yes       Date(s): 9.18.19    Last [facility] website verification:  done on 9.18.19  [email or web address]          Recurrent major depressive disorder, in full remission (H) 10/18/2018     Priority: Medium    ACP (advance care planning) 03/22/2017     Priority: Medium     Advance Care Planning 7/6/2017: ACP Review of Chart / Resources Provided:  Reviewed chart for advance care plan.  Valorie Awad has no plan or code status on file. Discussed available resources and provided with information.   Added by KARLY CORBETT                Spasm of muscle 08/12/2015     Priority: Medium    Migraine 10/10/2007     Priority: Medium     Problem list name updated by automated process.  Provider to review      Anxiety state 09/20/2001     Priority: Medium     Problem list name updated by automated process. Provider to review          Past Medical History:    Past Medical History:   Diagnosis Date    Abnormal Pap smear     Anxiety 9/20/2001    Marijuana use     Migraine headaches 10/10/2007    Pyelonephritis, acute 11/1/2023    Supervision of other normal pregnancy        Past Surgical History:    Past Surgical History:   Procedure Laterality Date    Comminuted fracture elbow Left 10/25/2013    lauren placed    CYSTOSCOPY      bladder distention    TONSILLECTOMY         Family History:    Family History   Problem Relation Age of Onset    Other - See Comments Father         alcoholism    Depression Father     Mental Illness Father     Depression Mother     Irritable Bowel Syndrome Mother     Mental Illness Mother     Other - See Comments Mother         migraine headache    Other - See Comments Maternal Grandmother         blood disease    Lipids Maternal Grandmother         hyperlipidemia    Thyroid Disease Maternal Grandmother     Diabetes No family hx of     Asthma No family hx of        Social History:  Marital Status:  Single [1]  Social History     Tobacco Use    Smoking status: Never     Passive exposure: Current    Smokeless tobacco: Never   Vaping Use    Vaping status: Never Used   Substance Use Topics    Alcohol use: Yes     Comment: social    Drug use: No        Medications:    doxycycline hyclate (VIBRAMYCIN) 100 MG capsule  doxycycline hyclate (VIBRAMYCIN) 100 MG capsule  cyclobenzaprine (FLEXERIL) 10 MG tablet  diazepam (VALIUM) 10 MG tablet  diltiazem ER (DILT-XR) 120 MG 24 hr capsule  FLUoxetine (PROZAC) 10 MG capsule  FLUoxetine (PROZAC) 20 MG capsule  hydrOXYzine (ATARAX) 25 MG tablet  ISIBLOOM 0.15-30 MG-MCG tablet  naproxen (NAPROSYN) 500 MG tablet  prochlorperazine (COMPAZINE) 10 MG tablet  SUMAtriptan (IMITREX) 100 MG tablet  valACYclovir (VALTREX) 500 MG tablet          Review of  Systems: see  HPI ; otherwise neg     Physical Exam   BP: 130/91  Pulse: 111  Temp: 98.6  F (37  C)  Resp: 22  SpO2: 96 %      Physical Exam  Vitals and nursing note reviewed.   Constitutional:       General: She is in acute distress.   Cardiovascular:      Rate and Rhythm: Tachycardia present.   Pulmonary:      Effort: Pulmonary effort is normal.   Skin:     Comments: 3 cm abscess left axilla with surrounding redness. It is very tender to palpation.    Neurological:      Mental Status: She is alert and oriented to person, place, and time.   Psychiatric:      Comments: anxious         ED Course     ED Course as of 10/28/24 2137   Mon Oct 28, 2024   1821 Apparently prescription did not go through so resent but patient wanted [facility].     [facility]    PROCEDURE: -Incision/Drainage    Date/Time: 10/27/2024 12:21 AM    Performed by: Isidra Tomlinson MD  Authorized by: Isidra Tomlinson MD    Risks, benefits and alternatives discussed.      LOCATION:      Type:  Abscess    Size:  3    Location:  Upper extremity (left axilla)    PRE-PROCEDURE DETAILS:     Skin preparation:  Betadine    PROCEDURE TYPE:     Complexity:  Simple    PROCEDURE DETAILS:     Incision types:  Stab incision    Incision depth:  Subcutaneous    Scalpel blade:  11    Wound management:  Probed and deloculated    Drainage:  Purulent    Drainage amount:  Moderate    Wound treatment:  Drain placed    Packing materials:  1/4 in gauze    PROCEDURE    Patient Tolerance:  Patient tolerated the procedure well with no immediate complications             No results found for this or any previous visit (from the past 24 hours).    Medications   lidocaine 1 % 10 mL (5 mLs Intradermal $Given by Other Clinician 10/27/24 0101)   ketorolac (TORADOL) injection 30 mg (30 mg Intramuscular $Given 10/27/24 0135)   doxycycline hyclate (VIBRAMYCIN) capsule 100 mg (100 mg Oral $Given 10/27/24 0135)       Assessments & Plan (with Medical Decision Making):  29 year old female with hx of MRSA who presents with left axillary abscess that is 3 cm with surrounding redness.  I and D was done with drainage of purulent material.  Packing was placed to allow to continue to drain. She is allergic to bactrim, given her hx of mrsa will treat with doxycycline.  Discussed wound care and follow up in clinic in 2-3 days for a recheck and removal of packing. Return to ER if new or worsening symptoms.      I have reviewed the nursing notes.    I have reviewed the findings, diagnosis, plan and need for follow up with the patient.                Discharge Medication List as of 10/27/2024  1:43 AM        START taking these medications    Details   doxycycline hyclate (VIBRAMYCIN) 100 MG capsule Take 1 capsule (100 mg) by mouth 2 times daily for 10 days., Disp-20 capsule, R-0, E-Prescribe             Final diagnoses:   Cutaneous abscess of left axilla       10/26/2024   Isidra Tomlinson MD     [facility]       Isidra Tomlinson MD  10/27/24 0323       Shane Sawant MD  10/28/24 0664

## 2024-10-27 NOTE — DISCHARGE INSTRUCTIONS
Doxy antibiotic two times a day for 10 days     Follow up in Clinic in 2-3 days to have packing removed.  Becareful when you shower to not pull it out sooner. Expect continued drainage.     Ibuprofen for discomfort     Return to ER if new or worsening symptoms.

## 2024-10-28 PROCEDURE — 10060 I&D ABSCESS SIMPLE/SINGLE: CPT | Performed by: FAMILY MEDICINE

## 2024-10-28 RX ORDER — DOXYCYCLINE 100 MG/1
100 CAPSULE ORAL 2 TIMES DAILY
Qty: 20 CAPSULE | Refills: 0 | Status: SHIPPED | OUTPATIENT
Start: 2024-10-28 | End: 2024-11-07

## 2024-10-29 ENCOUNTER — TELEPHONE (OUTPATIENT)
Dept: FAMILY MEDICINE | Facility: OTHER | Age: 29
End: 2024-10-29

## 2024-10-29 NOTE — TELEPHONE ENCOUNTER
Symptom or reason needing to speak to RN:   ER follow up      Best number to return call: [phone]      Best time to return call: asap

## 2024-10-30 ENCOUNTER — OFFICE VISIT (OUTPATIENT)
Dept: FAMILY MEDICINE | Facility: OTHER | Age: 29
End: 2024-10-30
Attending: STUDENT IN AN ORGANIZED HEALTH CARE EDUCATION/TRAINING PROGRAM
Payer: COMMERCIAL

## 2024-10-30 VITALS
HEIGHT: 67 IN | WEIGHT: 217.38 LBS | HEART RATE: 91 BPM | BODY MASS INDEX: 34.12 KG/M2 | OXYGEN SATURATION: 98 % | RESPIRATION RATE: 16 BRPM | SYSTOLIC BLOOD PRESSURE: 148 MMHG | TEMPERATURE: 97.8 F | DIASTOLIC BLOOD PRESSURE: 99 MMHG

## 2024-10-30 DIAGNOSIS — L02.412 CUTANEOUS ABSCESS OF LEFT AXILLA: Primary | ICD-10-CM

## 2024-10-30 DIAGNOSIS — F41.0 PANIC DISORDER WITHOUT AGORAPHOBIA: ICD-10-CM

## 2024-10-30 PROCEDURE — G0463 HOSPITAL OUTPT CLINIC VISIT: HCPCS

## 2024-10-30 PROCEDURE — 99213 OFFICE O/P EST LOW 20 MIN: CPT | Performed by: STUDENT IN AN ORGANIZED HEALTH CARE EDUCATION/TRAINING PROGRAM

## 2024-10-30 RX ORDER — FLUOCINONIDE TOPICAL SOLUTION USP, 0.05% 0.5 MG/ML
SOLUTION TOPICAL
COMMUNITY
Start: 2024-07-16

## 2024-10-30 RX ORDER — MINOXIDIL 2.5 MG/1
TABLET ORAL
COMMUNITY
Start: 2024-10-28

## 2024-10-30 RX ORDER — DIAZEPAM 10 MG/1
10 TABLET ORAL DAILY PRN
Qty: 25 TABLET | Refills: 0 | Status: SHIPPED | OUTPATIENT
Start: 2024-10-30

## 2024-10-30 ASSESSMENT — PATIENT HEALTH QUESTIONNAIRE - PHQ9
SUM OF ALL RESPONSES TO PHQ QUESTIONS 1-9: 12
SUM OF ALL RESPONSES TO PHQ QUESTIONS 1-9: 12
10. IF YOU CHECKED OFF ANY PROBLEMS, HOW DIFFICULT HAVE THESE PROBLEMS MADE IT FOR YOU TO DO YOUR WORK, TAKE CARE OF THINGS AT HOME, OR GET ALONG WITH OTHER PEOPLE: SOMEWHAT DIFFICULT

## 2024-10-30 ASSESSMENT — ANXIETY QUESTIONNAIRES
4. TROUBLE RELAXING: MORE THAN HALF THE DAYS
GAD7 TOTAL SCORE: 13
8. IF YOU CHECKED OFF ANY PROBLEMS, HOW DIFFICULT HAVE THESE MADE IT FOR YOU TO DO YOUR WORK, TAKE CARE OF THINGS AT HOME, OR GET ALONG WITH OTHER PEOPLE?: SOMEWHAT DIFFICULT
2. NOT BEING ABLE TO STOP OR CONTROL WORRYING: MORE THAN HALF THE DAYS
6. BECOMING EASILY ANNOYED OR IRRITABLE: NEARLY EVERY DAY
1. FEELING NERVOUS, ANXIOUS, OR ON EDGE: MORE THAN HALF THE DAYS
7. FEELING AFRAID AS IF SOMETHING AWFUL MIGHT HAPPEN: NOT AT ALL
GAD7 TOTAL SCORE: 13
7. FEELING AFRAID AS IF SOMETHING AWFUL MIGHT HAPPEN: NOT AT ALL
GAD7 TOTAL SCORE: 13
IF YOU CHECKED OFF ANY PROBLEMS ON THIS QUESTIONNAIRE, HOW DIFFICULT HAVE THESE PROBLEMS MADE IT FOR YOU TO DO YOUR WORK, TAKE CARE OF THINGS AT HOME, OR GET ALONG WITH OTHER PEOPLE: SOMEWHAT DIFFICULT
5. BEING SO RESTLESS THAT IT IS HARD TO SIT STILL: MORE THAN HALF THE DAYS
3. WORRYING TOO MUCH ABOUT DIFFERENT THINGS: MORE THAN HALF THE DAYS

## 2024-10-30 ASSESSMENT — PAIN SCALES - GENERAL: PAINLEVEL_OUTOF10: MODERATE PAIN (5)

## 2024-10-30 NOTE — PROGRESS NOTES
"  Assessment & Plan     Panic disorder without agoraphobia  On Valium and using PRN for anxiety  Absolutely do not recommend long term continuation of this due to side effects  Willing to refill for now.  - diazepam (VALIUM) 10 MG tablet; Take 1 tablet (10 mg) by mouth daily as needed for anxiety.    Cutaneous abscess of left axilla  S/P I&D of 3 cm left axillary abscess, probed and deloculated  Started doxycycline 10/27 evening and tells me she feels significantly better.  Thinks the swelling has gone down substantially  1.5 inch of 1/4 in iodoform gauze removed.  There is still fair amount of induration.  Mild discharge when expressed- mostly serosanguinous with only slightest hint of pus.    Repacking not indicated and patient very anxious.  Did have mom accompany her to this visit due to her anxiety  Of note, she did have an abscess that needed to be drained in the same around about 10 years ago.  Finish course of antibiotics.  Recommend concurrent probiotic.      MED REC REQUIRED  Post Medication Reconciliation Status: discharge medications reconciled, continue medications without change  BMI  Estimated body mass index is 34.05 kg/m  as calculated from the following:    Height as of this encounter: 1.702 m (5' 7\").    Weight as of this encounter: 98.6 kg (217 lb 6 oz).         Return in about 3 months (around 1/30/2025).    Maya Eugene is a 29 year old, presenting for the following health issues:  ER F/U, Anxiety, and Depression        10/30/2024     9:57 AM   Additional Questions   Roomed by Margret Muñiz   Accompanied by None         10/30/2024     9:57 AM   Patient Reported Additional Medications   Patient reports taking the following new medications None     HPI     ED/UC Followup:    Facility:  [facility]  Date of visit: 10/26/2024  Reason for visit: Cutaneous abscess of left axilla   Current Status: patient reports still experiencing pain currently- 5/10, drainage at the site         Review of " "Systems  Constitutional, HEENT, cardiovascular, pulmonary, GI, , musculoskeletal, neuro, skin, endocrine and psych systems are negative, except as otherwise noted.      Objective    BP (!) 148/99 (BP Location: Right arm, Patient Position: Sitting, Cuff Size: Adult Large)   Pulse 91   Temp 97.8  F (36.6  C) (Tympanic)   Resp 16   Ht 1.702 m (5' 7\")   Wt 98.6 kg (217 lb 6 oz)   LMP 10/26/2024   SpO2 98%   BMI 34.05 kg/m    Body mass index is 34.05 kg/m .  Physical Exam   GENERAL: alert and no distress  EYES: Eyes grossly normal to inspection, PERRL and conjunctivae and sclerae normal  HENT: ear canals and TM's normal, nose and mouth without ulcers or lesions  NECK: no adenopathy, no asymmetry, masses, or scars  RESP: lungs clear to auscultation - no rales, rhonchi or wheezes  CV: regular rate and rhythm, normal S1 S2, no S3 or S4, no murmur, click or rub, no peripheral edema  ABDOMEN: soft, nontender, no hepatosplenomegaly, no masses and bowel sounds normal  MS: no gross musculoskeletal defects noted, no edema  SKIN: no suspicious lesions or rashes.  Induration of the skin of left axilla anteriorly.  Small pore where packing placed.  Not draining.  Packing removed and small amount of serosanguinous fluid with hint of purulence expressed.  Tender to palpation  NEURO: Normal strength and tone, mentation intact and speech normal  PSYCH: mentation appears normal, affect normal/bright          Signed Electronically by: Syeda Caballero MD    Answers submitted by the patient for this visit:  Patient Health Questionnaire (Submitted on 10/30/2024)  If you checked off any problems, how difficult have these problems made it for you to do your work, take care of things at home, or get along with other people?: Somewhat difficult  PHQ9 TOTAL SCORE: 12  Patient Health Questionnaire (G7) (Submitted on 10/30/2024)  NITZA 7 TOTAL SCORE: 13  General Questionnaire (Submitted on 10/30/2024)  Chief Complaint: Chronic problems " general questions HPI Form  What is the reason for your visit today? : follow up from er visit  How many days per week do you miss taking your medication?: 0  Questionnaire about: Chronic problems general questions HPI Form (Submitted on 10/30/2024)  Chief Complaint: Chronic problems general questions HPI Form

## 2024-10-31 ENCOUNTER — TELEPHONE (OUTPATIENT)
Dept: FAMILY MEDICINE | Facility: OTHER | Age: 29
End: 2024-10-31

## 2024-10-31 DIAGNOSIS — R11.0 NAUSEA: Primary | ICD-10-CM

## 2024-10-31 NOTE — TELEPHONE ENCOUNTER
Results requested: Red Flag Symptom, allergic reaction to antibiotics.      Best number to reach patient at: [phone]      Best time to call patient: anytime    Send to care team in basket.

## 2024-10-31 NOTE — TELEPHONE ENCOUNTER
"Patient seen in ED on 10/26 for abscess of left axilla and had follow up appt on 10/30 with PCP.   Patient received doxycycline and started this on Monday, 10/28, morning.   Patient had taken medication BID Monday and Tuesday and only AM dose on Wednesday.   Patient reports being \"nauseous, dizzy, feeling out of it and I get really hot\".   Patient denies difficulty breathing, chest pain, difficulty swallowing, swollen, itchy throat or lips.   Patient requesting alternative antibiotic. Patient states she is taking medication with food and has had yogurt.  PCP out today and tomorrow.   RN CC routing to covering provider to advise.  Than you.   "

## 2024-11-01 RX ORDER — FAMOTIDINE 20 MG/1
20 TABLET, FILM COATED ORAL 2 TIMES DAILY
Qty: 30 TABLET | Refills: 0 | Status: SHIPPED | OUTPATIENT
Start: 2024-11-01 | End: 2024-11-16

## 2024-11-01 NOTE — TELEPHONE ENCOUNTER
Please relay to patient that unfortunately there is not a great alternative antibiotic that I am aware of.  She has an allergy to Bactrim and clindamycin, the other 2 oral medications that would cover possibly MRSA which we worry about with an abscess.  I would encourage her to try to take the antibiotic with food to see if it helps any of her nausea and dizziness.  I did reach out to pharmacy to see if they have any other recommendations.

## 2024-11-01 NOTE — CONFIDENTIAL NOTE
No answer / No  set-up   Will send [facility]  Teed up script for famotidine for PcP to review

## 2024-11-01 NOTE — TELEPHONE ENCOUNTER
RN CC made attempt to call patient.   Patient did not answer and does not have VM to leave a message.   RN CC will attempt again at a later time.

## 2024-11-04 NOTE — TELEPHONE ENCOUNTER
RN CC talked with patient and notified patient of provider recommendation and new Rx for famotidine BID sent to pharmacy.  Patient reports she is unable to take the doxycycline due to this making her sick and having to work.   RN CC advised for patient to call clinic or ED/UC for new or worsening symptoms.   Patient verbalized understanding.        Kaye Claudio; Nasrin Egan MD3 days ago     WHITNEY Joshi,    I consulted one of our pharmacists about this and she recommended adding famotidine 20mg twice daily to help reduce nausea on doxycycline and avoid laying down right after taking it as well.  Agree with taking with food.    Hope this helps,  Kaye Claudio, PharmD  Medication Therapy Management (MTM) Pharmacist

## 2024-11-04 NOTE — ED PROVIDER NOTES
History     Chief Complaint   Patient presents with     Vaginal Bleeding     HPI  Valorie Awad is a 22 year old female who presents with pelvic pain and vaginal discharge and bleeding since yesterday. States yesterday she had mucousy discharge with mild cramping. Today, she passed a solver dollar sized clot and the camping worsened. She is sexually active with 1 partner for the last 2 years. She occasionally uses condoms. She received the depo shot for contraception and is due for her next dose this month. She will usually have some mild spotting occasionally while on the depo, never this much bleeding, so she is concerned today. She is .     I have reviewed the Medications, Allergies, Past Medical and Surgical History, and Social History in the Epic system.    Review of Systems   Constitutional: Negative for appetite change, chills, fever and unexpected weight change.   HENT: Negative for sore throat.    Respiratory: Negative for shortness of breath.    Cardiovascular: Negative for chest pain.   Gastrointestinal: Negative for abdominal distention, abdominal pain, anal bleeding, blood in stool, constipation, diarrhea, nausea and vomiting.   Genitourinary: Positive for pelvic pain, vaginal bleeding and vaginal discharge. Negative for difficulty urinating, dyspareunia, dysuria, flank pain, frequency, genital sores, hematuria, urgency and vaginal pain.   Musculoskeletal: Negative.    Skin: Negative.    Neurological: Negative.    All other systems reviewed and are negative.     Past Medical History:   Past Medical History:   Diagnosis Date     Abnormal Pap smear      Anxiety 2001     Marijuana use      Migraine headaches 10/10/2007     Supervision of other normal pregnancy        Past Surgical History:   Procedure Laterality Date     Comminuted fracture elbow Left 10/25/2013    lauren placed     CYSTOSCOPY      bladder distention     TONSILLECTOMY         Social History     Social History     Marital status:  Single     Spouse name: N/A     Number of children: N/A     Years of education: N/A     Occupational History     student      Social History Main Topics     Smoking status: Never Smoker     Smokeless tobacco: Never Used     Alcohol use No     Drug use: No     Sexual activity: Yes     Partners: Male     Other Topics Concern      Service No     Blood Transfusions Yes     Caffeine Concern No     Seat Belt Yes     Parent/Sibling W/ Cabg, Mi Or Angioplasty Before 65f 55m? No     Social History Narrative       Patient's Medications   New Prescriptions    No medications on file   Previous Medications    BUTALBITAL-ACETAMINOPHEN-CAFFEINE (FIORICET/ESGIC) -40 MG PER TABLET    TAKE 1 TABLET BY MOUTH EVERY 4 HOURS AS NEEDED, MAX OF 6 TABLETS IN 24 HOURS    CLONAZEPAM (KLONOPIN) 2 MG TABLET    Take 1 tab daily as needed for anxiety. * need clinic follow-up apptmt for further refills [phone]    MEDROXYPROGESTERONE (DEPO-PROVERA) 150 MG/ML INJECTION    Inject 1 mL (150 mg) into the muscle every 3 months    SUMATRIPTAN (IMITREX) 100 MG TABLET    Take 1 tablet (100 mg) by mouth at onset of headache for migraine May repeat in 2 hours if needed: max 2/day; average number of headaches monthly    TRAMADOL (ULTRAM) 50 MG TABLET    TAKE 1 TABLET BY MOUTH EVERY 6 HOURS AS NEEDED FOR MODERATE PAIN   Modified Medications    No medications on file   Discontinued Medications    AZITHROMYCIN (ZITHROMAX) 250 MG TABLET    Two tablets first day, then one tablet daily for four days.    PROPRANOLOL (INDERAL LA) 60 MG 24 HR CAPSULE    Take 3 capsules (180 mg) by mouth daily       Allergies: Bactrim [sulfamethoxazole w-trimethoprim]; Morphine hcl; Clindamycin; Nitrofurantoin; Nitrofurantoin monohydrate macrocrystals; and Loracarbef      Physical Exam   BP: 127/94  Pulse: 98  Temp: 97.8  F (36.6  C)  Resp: 20  SpO2: 98 %  Physical Exam   Constitutional: She is oriented to person, place, and time. Vital signs are normal. She appears  well-developed and well-nourished.  Non-toxic appearance. She does not have a sickly appearance. She does not appear ill. No distress.   HENT:   Head: Normocephalic and atraumatic.   Right Ear: External ear normal.   Left Ear: External ear normal.   Nose: Nose normal.   Mouth/Throat: Oropharynx is clear and moist. No oropharyngeal exudate.   Eyes: Conjunctivae are normal. Pupils are equal, round, and reactive to light. Right eye exhibits no discharge. Left eye exhibits no discharge. No scleral icterus.   Neck: Normal range of motion. Neck supple.   Cardiovascular: Normal rate, regular rhythm, normal heart sounds and intact distal pulses.  Exam reveals no gallop and no friction rub.    No murmur heard.  Pulmonary/Chest: Effort normal and breath sounds normal. No respiratory distress. She has no wheezes. She has no rales. She exhibits no tenderness.   Abdominal: Soft. Bowel sounds are normal. She exhibits no distension and no mass. There is no tenderness. There is no rebound and no guarding.   Genitourinary: Vagina normal. There is no rash, tenderness, lesion or injury on the right labia. There is no rash, tenderness, lesion or injury on the left labia. Uterus is tender. Cervix exhibits motion tenderness and discharge (Scant bloody/mucousy discharge. ). Right adnexum displays tenderness. Left adnexum displays tenderness. No erythema, tenderness or bleeding in the vagina. No foreign body in the vagina. No signs of injury around the vagina. No vaginal discharge found.   Musculoskeletal: Normal range of motion. She exhibits no edema.   Lymphadenopathy:     She has no cervical adenopathy.   Neurological: She is alert and oriented to person, place, and time. No cranial nerve deficit.   Skin: Skin is warm and dry. No rash noted. She is not diaphoretic. No erythema. No pallor.   Psychiatric: She has a normal mood and affect. Her behavior is normal. Judgment and thought content normal.   Nursing note and vitals  reviewed.      ED Course     ED Course     Procedures               Labs Ordered and Resulted from Time of ED Arrival Up to the Time of Departure from the ED   UA MACROSCOPIC WITH REFLEX TO MICRO AND CULTURE - Abnormal; Notable for the following:        Result Value    Blood Urine Large (*)     Leukocyte Esterase Urine Trace (*)     RBC Urine 23 (*)     WBC Urine 5 (*)     Bacteria Urine Few (*)     Mucous Urine Present (*)     All other components within normal limits   HCG QUALITATIVE URINE   NEISSERIA GONORRHOEAE PCR   CHLAMYDIA TRACHOMATIS PCR   WET PREP       Assessments & Plan (with Medical Decision Making)   Valorie is a sexually active female presenting with bloody discharge and pelvic cramping since yesterday, due for her next depo shot this month. Urine HCG is negative today. UA negative for infection. Pelvic exam was positive for scant bloody/mucousy discharge from the cervix and + CMT as well as adnexal and uterine tenderness. Due to the tenderness, will cover for PID with Rocephin 250mg IM here and Zithromax 1g PO x 1 given here as well. Wet prep came back normal. GC/Chlamydia pending. Discussed with her this may be a painful menstrual cycle given she is at the end of her depo effectiveness, but will cover for infection/PID given the tenderness on exam. She was discharged home in good condition.     Plan: I am treating you as if you have a pelvic infection, though not all tests are back yet. If any of the tests come back positive, we will call you. Otherwise, follow up with your primary care provider for re-check in 3 days and consider getting your depo shot soon.     I have reviewed the nursing notes.    I have reviewed the findings, diagnosis, plan and need for follow up with the patient.    New Prescriptions    No medications on file       Final diagnoses:   PID (pelvic inflammatory disease)       5/16/2017   [facility]     Cecy Chen PA-C  05/16/17 1605     I personally spent

## 2025-03-30 ENCOUNTER — HEALTH MAINTENANCE LETTER (OUTPATIENT)
Age: 30
End: 2025-03-30

## 2025-05-12 NOTE — NURSING NOTE
"Chief Complaint   Patient presents with     Headache     migraine follow up       Initial /58 (BP Location: Left arm, Patient Position: Chair, Cuff Size: Adult Large)  Pulse 107  Temp 99.1  F (37.3  C) (Tympanic)  Resp 16  Wt 205 lb (93 kg)  SpO2 96%  BMI 33.09 kg/m2 Estimated body mass index is 33.09 kg/(m^2) as calculated from the following:    Height as of 7/11/17: 5' 6\" (1.676 m).    Weight as of this encounter: 205 lb (93 kg).  Medication Reconciliation: complete   Urszula Morrow LPN      "
170.18

## 2025-05-22 ENCOUNTER — APPOINTMENT (OUTPATIENT)
Dept: ULTRASOUND IMAGING | Facility: HOSPITAL | Age: 30
End: 2025-05-22
Attending: EMERGENCY MEDICINE

## 2025-05-22 ENCOUNTER — HOSPITAL ENCOUNTER (EMERGENCY)
Facility: HOSPITAL | Age: 30
Discharge: LEFT BEFORE DISCHARGE | End: 2025-05-22
Admitting: EMERGENCY MEDICINE

## 2025-05-22 VITALS
TEMPERATURE: 98.8 F | DIASTOLIC BLOOD PRESSURE: 82 MMHG | OXYGEN SATURATION: 95 % | SYSTOLIC BLOOD PRESSURE: 128 MMHG | HEART RATE: 91 BPM | RESPIRATION RATE: 18 BRPM

## 2025-05-22 PROCEDURE — 99281 EMR DPT VST MAYX REQ PHY/QHP: CPT

## 2025-05-22 PROCEDURE — 76801 OB US < 14 WKS SINGLE FETUS: CPT | Mod: 26 | Performed by: RADIOLOGY

## 2025-05-22 PROCEDURE — 76801 OB US < 14 WKS SINGLE FETUS: CPT

## 2025-05-22 PROCEDURE — 76817 TRANSVAGINAL US OBSTETRIC: CPT | Mod: 26 | Performed by: RADIOLOGY

## 2025-05-22 PROCEDURE — 99281 EMR DPT VST MAYX REQ PHY/QHP: CPT | Performed by: EMERGENCY MEDICINE

## 2025-05-22 ASSESSMENT — COLUMBIA-SUICIDE SEVERITY RATING SCALE - C-SSRS
2. HAVE YOU ACTUALLY HAD ANY THOUGHTS OF KILLING YOURSELF IN THE PAST MONTH?: NO
6. HAVE YOU EVER DONE ANYTHING, STARTED TO DO ANYTHING, OR PREPARED TO DO ANYTHING TO END YOUR LIFE?: NO
1. IN THE PAST MONTH, HAVE YOU WISHED YOU WERE DEAD OR WISHED YOU COULD GO TO SLEEP AND NOT WAKE UP?: NO

## 2025-05-22 NOTE — ED TRIAGE NOTES
Here with lower abdominal pain. Started last night and increased today.  Positive pregnancy test.  Last period was march 10th.

## 2025-05-27 ENCOUNTER — MEDICAL CORRESPONDENCE (OUTPATIENT)
Dept: MRI IMAGING | Facility: HOSPITAL | Age: 30
End: 2025-05-27

## 2025-05-27 ENCOUNTER — TELEPHONE (OUTPATIENT)
Dept: EMERGENCY MEDICINE | Facility: HOSPITAL | Age: 30
End: 2025-05-27

## 2025-05-27 NOTE — ED NOTES
Care Transitions focused note:      Follow up on patient who left without being seen on 5/22/2025    Called patient.  Stated she is much better.  Declined follow up visit. Will call on her own to get in.    BEENA Mendez

## 2025-07-24 ENCOUNTER — HOSPITAL ENCOUNTER (OUTPATIENT)
Dept: MRI IMAGING | Facility: HOSPITAL | Age: 30
End: 2025-07-24
Attending: STUDENT IN AN ORGANIZED HEALTH CARE EDUCATION/TRAINING PROGRAM
Payer: COMMERCIAL

## 2025-07-24 DIAGNOSIS — M54.16 LUMBAR RADICULOPATHY: ICD-10-CM

## 2025-07-24 PROCEDURE — 72148 MRI LUMBAR SPINE W/O DYE: CPT

## 2025-07-24 PROCEDURE — 72148 MRI LUMBAR SPINE W/O DYE: CPT | Mod: 26 | Performed by: RADIOLOGY

## 2025-08-19 ENCOUNTER — TELEPHONE (OUTPATIENT)
Dept: FAMILY MEDICINE | Facility: OTHER | Age: 30
End: 2025-08-19